# Patient Record
Sex: FEMALE | Race: WHITE | NOT HISPANIC OR LATINO
[De-identification: names, ages, dates, MRNs, and addresses within clinical notes are randomized per-mention and may not be internally consistent; named-entity substitution may affect disease eponyms.]

---

## 2017-01-05 ENCOUNTER — RX RENEWAL (OUTPATIENT)
Age: 82
End: 2017-01-05

## 2017-01-27 ENCOUNTER — RX RENEWAL (OUTPATIENT)
Age: 82
End: 2017-01-27

## 2017-02-12 ENCOUNTER — EMERGENCY (EMERGENCY)
Facility: HOSPITAL | Age: 82
LOS: 1 days | Discharge: HOME | End: 2017-02-12
Admitting: EMERGENCY MEDICINE
Payer: MEDICARE
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
## 2017-02-12 DIAGNOSIS — Z87.891 PERSONAL HISTORY OF NICOTINE DEPENDENCE: ICD-10-CM

## 2017-02-12 DIAGNOSIS — S00.83XA CONTUSION OF OTHER PART OF HEAD, INITIAL ENCOUNTER: ICD-10-CM

## 2017-02-12 DIAGNOSIS — S62.201A UNSPECIFIED FRACTURE OF FIRST METACARPAL BONE, RIGHT HAND, INITIAL ENCOUNTER FOR CLOSED FRACTURE: ICD-10-CM

## 2017-02-12 DIAGNOSIS — F03.90 UNSPECIFIED DEMENTIA WITHOUT BEHAVIORAL DISTURBANCE: ICD-10-CM

## 2017-02-12 DIAGNOSIS — M79.89 OTHER SPECIFIED SOFT TISSUE DISORDERS: ICD-10-CM
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
## 2017-02-12 DIAGNOSIS — I10 ESSENTIAL (PRIMARY) HYPERTENSION: ICD-10-CM

## 2017-02-12 PROCEDURE — 70450 CT HEAD/BRAIN W/O DYE: CPT

## 2017-02-12 PROCEDURE — 70450 CT HEAD/BRAIN W/O DYE: CPT | Mod: 26

## 2017-02-12 PROCEDURE — 99284 EMERGENCY DEPT VISIT MOD MDM: CPT

## 2017-02-12 PROCEDURE — 99284 EMERGENCY DEPT VISIT MOD MDM: CPT | Mod: 25

## 2017-02-12 PROCEDURE — 70480 CT ORBIT/EAR/FOSSA W/O DYE: CPT

## 2017-02-14 ENCOUNTER — OUTPATIENT (OUTPATIENT)
Dept: OUTPATIENT SERVICES | Facility: HOSPITAL | Age: 82
LOS: 1 days | End: 2017-02-14
Payer: MEDICARE

## 2017-02-14 ENCOUNTER — APPOINTMENT (OUTPATIENT)
Dept: RADIOLOGY | Facility: HOSPITAL | Age: 82
End: 2017-02-14

## 2017-02-14 ENCOUNTER — TRANSCRIPTION ENCOUNTER (OUTPATIENT)
Age: 82
End: 2017-02-14

## 2017-02-14 DIAGNOSIS — M79.645 PAIN IN LEFT FINGER(S): ICD-10-CM
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
## 2017-02-14 PROCEDURE — 73140 X-RAY EXAM OF FINGER(S): CPT

## 2017-02-28 ENCOUNTER — APPOINTMENT (OUTPATIENT)
Dept: GERIATRICS | Facility: CLINIC | Age: 82
End: 2017-02-28

## 2017-08-11 ENCOUNTER — RX RENEWAL (OUTPATIENT)
Age: 82
End: 2017-08-11

## 2018-02-14 ENCOUNTER — RX RENEWAL (OUTPATIENT)
Age: 83
End: 2018-02-14

## 2018-02-19 ENCOUNTER — RX RENEWAL (OUTPATIENT)
Age: 83
End: 2018-02-19

## 2018-10-23 ENCOUNTER — APPOINTMENT (OUTPATIENT)
Dept: GERIATRICS | Facility: CLINIC | Age: 83
End: 2018-10-23
Payer: MEDICARE

## 2018-10-23 VITALS
HEART RATE: 89 BPM | DIASTOLIC BLOOD PRESSURE: 70 MMHG | SYSTOLIC BLOOD PRESSURE: 126 MMHG | WEIGHT: 115 LBS | TEMPERATURE: 98.2 F | BODY MASS INDEX: 19.74 KG/M2 | OXYGEN SATURATION: 98 %

## 2018-10-23 PROCEDURE — G0439: CPT

## 2018-10-23 PROCEDURE — G0008: CPT

## 2018-10-23 PROCEDURE — 90662 IIV NO PRSV INCREASED AG IM: CPT

## 2018-10-24 LAB
ALBUMIN SERPL ELPH-MCNC: 4.6 G/DL
ALP BLD-CCNC: 65 U/L
ALT SERPL-CCNC: 14 U/L
ANION GAP SERPL CALC-SCNC: 14 MMOL/L
AST SERPL-CCNC: 23 U/L
BASOPHILS # BLD AUTO: 0.01 K/UL
BASOPHILS NFR BLD AUTO: 0.1 %
BILIRUB SERPL-MCNC: 0.2 MG/DL
BUN SERPL-MCNC: 31 MG/DL
CALCIUM SERPL-MCNC: 10.4 MG/DL
CHLORIDE SERPL-SCNC: 104 MMOL/L
CO2 SERPL-SCNC: 24 MMOL/L
CREAT SERPL-MCNC: 1.32 MG/DL
EOSINOPHIL # BLD AUTO: 0.12 K/UL
EOSINOPHIL NFR BLD AUTO: 1.3 %
FOLATE SERPL-MCNC: 18.2 NG/ML
GLUCOSE SERPL-MCNC: 117 MG/DL
HCT VFR BLD CALC: 41.3 %
HGB BLD-MCNC: 13.2 G/DL
IMM GRANULOCYTES NFR BLD AUTO: 0.2 %
LYMPHOCYTES # BLD AUTO: 2.77 K/UL
LYMPHOCYTES NFR BLD AUTO: 29.9 %
MAN DIFF?: NORMAL
MCHC RBC-ENTMCNC: 30.8 PG
MCHC RBC-ENTMCNC: 32 GM/DL
MCV RBC AUTO: 96.3 FL
MONOCYTES # BLD AUTO: 0.58 K/UL
MONOCYTES NFR BLD AUTO: 6.3 %
NEUTROPHILS # BLD AUTO: 5.76 K/UL
NEUTROPHILS NFR BLD AUTO: 62.2 %
PLATELET # BLD AUTO: 247 K/UL
POTASSIUM SERPL-SCNC: 4.5 MMOL/L
PROT SERPL-MCNC: 7.4 G/DL
RBC # BLD: 4.29 M/UL
RBC # FLD: 14.4 %
SODIUM SERPL-SCNC: 142 MMOL/L
TSH SERPL-ACNC: 0.88 UIU/ML
VIT B12 SERPL-MCNC: >2000 PG/ML
WBC # FLD AUTO: 9.26 K/UL

## 2019-02-01 ENCOUNTER — RX RENEWAL (OUTPATIENT)
Age: 84
End: 2019-02-01

## 2019-02-26 ENCOUNTER — APPOINTMENT (OUTPATIENT)
Dept: GERIATRICS | Facility: CLINIC | Age: 84
End: 2019-02-26
Payer: MEDICARE

## 2019-02-26 VITALS
SYSTOLIC BLOOD PRESSURE: 136 MMHG | TEMPERATURE: 97.9 F | HEART RATE: 96 BPM | WEIGHT: 121.1 LBS | DIASTOLIC BLOOD PRESSURE: 70 MMHG | BODY MASS INDEX: 20.79 KG/M2 | OXYGEN SATURATION: 97 %

## 2019-02-26 PROCEDURE — 99214 OFFICE O/P EST MOD 30 MIN: CPT

## 2019-02-28 NOTE — REVIEW OF SYSTEMS
[Confused] : confusion [Difficulty Walking] : difficulty walking [As Noted in HPI] : as noted in HPI [Suicidal] : not suicidal [Sleep Disturbances] : no sleep disturbances [Anxiety] : no anxiety [Depression] : no depression [Change In Personality] : no personality change [Negative] : Heme/Lymph

## 2019-02-28 NOTE — SOCIAL HISTORY
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 [de-identified] : No safety concerns identified.

## 2019-02-28 NOTE — HISTORY OF PRESENT ILLNESS
[FreeTextEntry1] : 85 yo female here with acute pain for past week that is in right buttock area and radiates down leg. Pt and aide deny a fall. Pt able to bear weight. Pt walked in.  Pt pain reproducible with movement, raising leg. No bruising acc to pt and [place].

## 2019-02-28 NOTE — PHYSICAL EXAM
[General Appearance - Alert] : alert [Sclera] : the sclera and conjunctiva were normal [PERRL With Normal Accommodation] : pupils were equal in size, round, and reactive to light [Extraocular Movements] : extraocular movements were intact [Outer Ear] : the ears and nose were normal in appearance [Oropharynx] : the oropharynx was normal [Neck Appearance] : the appearance of the neck was normal [Neck Cervical Mass (___cm)] : no neck mass was observed [Jugular Venous Distention Increased] : there was no jugular-venous distention [Thyroid Diffuse Enlargement] : the thyroid was not enlarged [Thyroid Nodule] : there were no palpable thyroid nodules [Auscultation Breath Sounds / Voice Sounds] : lungs were clear to auscultation bilaterally [Heart Rate And Rhythm] : heart rate was normal and rhythm regular [Heart Sounds] : normal S1 and S2 [Heart Sounds Gallop] : no gallops [Murmurs] : no murmurs [Heart Sounds Pericardial Friction Rub] : no pericardial rub [Bowel Sounds] : normal bowel sounds [Abdomen Soft] : soft [Abdomen Tenderness] : non-tender [Abdomen Mass (___ Cm)] : no abdominal mass palpated [No CVA Tenderness] : no ~M costovertebral angle tenderness [No Spinal Tenderness] : no spinal tenderness [Abnormal Walk] : normal gait [Nail Clubbing] : no clubbing  or cyanosis of the fingernails [Musculoskeletal - Swelling] : no joint swelling seen [Motor Tone] : muscle strength and tone were normal [FreeTextEntry1] : able to move hip joints, knee joints.   [Skin Color & Pigmentation] : normal skin color and pigmentation [Skin Turgor] : normal skin turgor [] : no rash [Deep Tendon Reflexes (DTR)] : deep tendon reflexes were 2+ and symmetric [Sensation] : the sensory exam was normal to light touch and pinprick [No Focal Deficits] : no focal deficits

## 2019-02-28 NOTE — ASSESSMENT
[FreeTextEntry1] : 85 yo with new sciatic like pain, unclear etiology, no fall, no vcf symptoms. \par \par tylenol 2 tabs twice a day\par Lidoderm patch to buttock 12 hours / day\par massage/PT\par \par If worsens or changes consider CT scan L/S, hip, pelvis, spine vs Hip xrays. \par \par Return in one week

## 2019-03-02 ENCOUNTER — EMERGENCY (EMERGENCY)
Facility: HOSPITAL | Age: 84
LOS: 1 days | Discharge: HOME | End: 2019-03-02
Attending: EMERGENCY MEDICINE | Admitting: EMERGENCY MEDICINE
Payer: MEDICARE

## 2019-03-02 VITALS
SYSTOLIC BLOOD PRESSURE: 133 MMHG | RESPIRATION RATE: 17 BRPM | HEART RATE: 80 BPM | OXYGEN SATURATION: 99 % | DIASTOLIC BLOOD PRESSURE: 76 MMHG

## 2019-03-02 VITALS
WEIGHT: 115.08 LBS | RESPIRATION RATE: 17 BRPM | HEIGHT: 63 IN | HEART RATE: 94 BPM | SYSTOLIC BLOOD PRESSURE: 135 MMHG | OXYGEN SATURATION: 99 % | DIASTOLIC BLOOD PRESSURE: 78 MMHG | TEMPERATURE: 98 F

## 2019-03-02 DIAGNOSIS — Z98.890 OTHER SPECIFIED POSTPROCEDURAL STATES: Chronic | ICD-10-CM

## 2019-03-02 DIAGNOSIS — M53.3 SACROCOCCYGEAL DISORDERS, NOT ELSEWHERE CLASSIFIED: ICD-10-CM

## 2019-03-02 PROCEDURE — 99283 EMERGENCY DEPT VISIT LOW MDM: CPT

## 2019-03-02 PROCEDURE — 72110 X-RAY EXAM L-2 SPINE 4/>VWS: CPT | Mod: 26

## 2019-03-02 PROCEDURE — 72110 X-RAY EXAM L-2 SPINE 4/>VWS: CPT

## 2019-03-02 RX ORDER — OXYCODONE HYDROCHLORIDE 5 MG/1
1 TABLET ORAL
Qty: 21 | Refills: 0
Start: 2019-03-02 | End: 2019-03-08

## 2019-03-02 RX ORDER — OXYCODONE AND ACETAMINOPHEN 5; 325 MG/1; MG/1
1 TABLET ORAL ONCE
Qty: 0 | Refills: 0 | Status: DISCONTINUED | OUTPATIENT
Start: 2019-03-02 | End: 2019-03-02

## 2019-03-02 RX ADMIN — OXYCODONE AND ACETAMINOPHEN 1 TABLET(S): 5; 325 TABLET ORAL at 13:07

## 2019-03-02 RX ADMIN — OXYCODONE AND ACETAMINOPHEN 1 TABLET(S): 5; 325 TABLET ORAL at 14:07

## 2019-03-02 NOTE — ED PROVIDER NOTE - NSFOLLOWUPINSTRUCTIONS_ED_ALL_ED_FT
Follow with your PMD within 48-72 hours.  Rest, no heavy lifting.  Warm compresses to area. Light walking. Take Napsrosyn 1 tab every 12 hours for pain with food, Percocet 1 tablet every 6 hrs as needed for breakthrough discomfort- caution drowsiness while taking this medication- do not drive or operate heavy machinery. Any worsening pain, weakness, numbness, bowel or urinary incontinence return to ER

## 2019-03-02 NOTE — ED PROVIDER NOTE - CARE PROVIDERS DIRECT ADDRESSES
,DirectAddress_Unknown ,DirectAddress_Unknown,[email or web address]

## 2019-03-02 NOTE — ED PROVIDER NOTE - PROVIDER TOKENS
PROVIDER:[TOKEN:[[id number]]] PROVIDER:[[id number]]],PROVIDER:[TOKEN:[[id number]]]

## 2019-03-02 NOTE — ED PROVIDER NOTE - ATTENDING CONTRIBUTION TO CARE
Pt seen and examined with [facility] Daigle; I agree with H and P as documented with corrections/additions noted in chart and below; I personally was present for key points of the exam and all procedures  elderly female with dementia sent in by PCP for worsening back pain less responsive to OTC meds (apap)  xrays negative positive responsive to percocet no "red flags" suggestive of need for advanced imaging and plain films appropriate for age  Plan  extra strength apap (1000 mg q8)  oxycodone w/o apap 1 with same interval for breakthrough  f/u and d/w PCP

## 2019-03-02 NOTE — ED ADULT TRIAGE NOTE - CHIEF COMPLAINT QUOTE
"I have pain in my ass shooting down my legs, she has pain to lower back radiating to her legs, she has history of sciatica" -patient, son

## 2019-03-02 NOTE — ED PROVIDER NOTE - CHPI ED SYMPTOMS NEG
no fatigue/no neck tenderness/no bladder dysfunction/no motor function loss/no bowel dysfunction/no constipation

## 2019-03-02 NOTE — ED ADULT NURSE NOTE - NSIMPLEMENTINTERV_GEN_ALL_ED
Implemented All Fall with Harm Risk Interventions:  [place] to call system. Call bell, personal items and telephone within reach. Instruct patient to call for assistance. Room bathroom lighting operational. Non-slip footwear when patient is off stretcher. Physically safe environment: no spills, clutter or unnecessary equipment. Stretcher in lowest position, wheels locked, appropriate side rails in place. Provide visual cue, wrist band, yellow gown, etc. Monitor gait and stability. Monitor for mental status changes and reorient to person, place, and time. Review medications for side effects contributing to fall risk. Reinforce activity limits and safety measures with patient and family. Provide visual clues: red socks.

## 2019-03-02 NOTE — ED PROVIDER NOTE - NS CPE EDP MUSC SACRAL LOC
+ midline ttp, + ttp over b/l sciatic region, no deformity, + slr b/l, distal strength and sensation intact b/l le, no saddle anesthesia

## 2019-03-02 NOTE — ED ADULT NURSE NOTE - OBJECTIVE STATEMENT
pt complaining of back pain 7/10 radiating to leg.  denies trauma. denies fall. denies numbness/tingling.

## 2019-03-02 NOTE — ED ADULT NURSE NOTE - NS TRANSFER PATIENT BELONGINGS
Statement Selected Clothing Statement Selected Statement Selected Statement Selected Statement Selected

## 2019-03-02 NOTE — ED PROVIDER NOTE - OBJECTIVE STATEMENT
87 y/o F PMH HTN, Dementia  c/o buttock pain x 1 week. Pt recently dx sciatica, started on home PT (first session yesterday). Taking tylenol without releif, took 400mg ibu this am with minimal relief. +difficulty ambulating d/t pain. 87 y/o F PMH HTN, Dementia  c/o buttock pain x 1 week. Pt recently dx sciatica, started on home PT (first session yesterday). Taking tylenol without relief, took 400mg ibu this am with minimal relief. +difficulty ambulating d/t pain. Pain radiates down b/l buttock and upper posterior thigh. Denies recent fall/trauma, cp, sob, abdominal pain. dysuria, bladder/bowel dysfunction, numbness/tingling/weakness.

## 2019-03-02 NOTE — ED PROVIDER NOTE - CARE PROVIDER_API CALL
Harvey Acosta)  Orthopaedic Sports Medicine; Orthopaedic Surgery  [address] [address]  Phone: [phone]  Fax: [phone]  Follow Up Time: Harvey Acosta)  Orthopaedic Sports Medicine; Orthopaedic Surgery  [address] [facility], [facility] [address]  [place] [address]  Phone: [phone]  Fax: [phone]  Follow Up Time:     Lynda Hood)  [facility]iatric Medicine; [facility]; Internal Medicine  [address] [facility], [facility] [address]  [place] [address]  Phone: [phone]  Fax: [phone]  Follow Up Time:

## 2019-03-03 ENCOUNTER — APPOINTMENT (OUTPATIENT)
Dept: GERIATRICS | Facility: CLINIC | Age: 84
End: 2019-03-03
Payer: MEDICARE

## 2019-03-03 PROCEDURE — 99348 HOME/RES VST EST LOW MDM 30: CPT

## 2019-03-04 ENCOUNTER — CHART COPY (OUTPATIENT)
Age: 84
End: 2019-03-04

## 2019-03-04 PROBLEM — F03.90 UNSPECIFIED DEMENTIA WITHOUT BEHAVIORAL DISTURBANCE: Chronic | Status: ACTIVE | Noted: 2019-03-02

## 2019-03-04 PROBLEM — F03.90 UNSPECIFIED DEMENTIA, UNSPECIFIED SEVERITY, WITHOUT BEHAVIORAL DISTURBANCE, PSYCHOTIC DISTURBANCE, MOOD DISTURBANCE, AND ANXIETY: Chronic | Status: ACTIVE | Noted: 2019-03-02

## 2019-03-04 PROBLEM — I10 ESSENTIAL (PRIMARY) HYPERTENSION: Chronic | Status: ACTIVE | Noted: 2019-03-02

## 2019-03-05 ENCOUNTER — APPOINTMENT (OUTPATIENT)
Dept: CT IMAGING | Facility: HOSPITAL | Age: 84
End: 2019-03-05
Payer: MEDICARE

## 2019-03-05 ENCOUNTER — LABORATORY RESULT (OUTPATIENT)
Age: 84
End: 2019-03-05

## 2019-03-05 ENCOUNTER — OUTPATIENT (OUTPATIENT)
Dept: OUTPATIENT SERVICES | Facility: HOSPITAL | Age: 84
LOS: 1 days | End: 2019-03-05
Payer: MEDICARE

## 2019-03-05 DIAGNOSIS — Z98.890 OTHER SPECIFIED POSTPROCEDURAL STATES: Chronic | ICD-10-CM

## 2019-03-05 DIAGNOSIS — M79.18 MYALGIA, OTHER SITE: ICD-10-CM

## 2019-03-05 DIAGNOSIS — R41.3 OTHER AMNESIA: ICD-10-CM

## 2019-03-05 DIAGNOSIS — M54.9 DORSALGIA, UNSPECIFIED: ICD-10-CM

## 2019-03-05 PROCEDURE — 72131 CT LUMBAR SPINE W/O DYE: CPT | Mod: 26

## 2019-03-05 PROCEDURE — 74176 CT ABD & PELVIS W/O CONTRAST: CPT | Mod: 26

## 2019-03-05 PROCEDURE — 72131 CT LUMBAR SPINE W/O DYE: CPT

## 2019-03-05 PROCEDURE — 72192 CT PELVIS W/O DYE: CPT

## 2019-03-05 PROCEDURE — 72192 CT PELVIS W/O DYE: CPT | Mod: 26,59

## 2019-03-05 PROCEDURE — 74176 CT ABD & PELVIS W/O CONTRAST: CPT

## 2019-03-05 NOTE — REVIEW OF SYSTEMS
[As Noted in HPI] : as noted in HPI [Difficulty Walking] : difficulty walking [Suicidal] : not suicidal [Sleep Disturbances] : no sleep disturbances [Anxiety] : no anxiety [Depression] : no depression [Negative] : Heme/Lymph [de-identified] : baseline confusion

## 2019-03-05 NOTE — SOCIAL HISTORY
[FreeTextEntry1] : son and dtr in law [FreeTextEntry2] : great [FreeTextEntry3] : kailee pt [FreeTextEntry4] : poor

## 2019-03-05 NOTE — PHYSICAL EXAM
[General Appearance - Alert] : alert [Sclera] : the sclera and conjunctiva were normal [PERRL With Normal Accommodation] : pupils were equal in size, round, and reactive to light [Extraocular Movements] : extraocular movements were intact [Outer Ear] : the ears and nose were normal in appearance [Oropharynx] : the oropharynx was normal [Neck Appearance] : the appearance of the neck was normal [Neck Cervical Mass (___cm)] : no neck mass was observed [Jugular Venous Distention Increased] : there was no jugular-venous distention [Thyroid Diffuse Enlargement] : the thyroid was not enlarged [Thyroid Nodule] : there were no palpable thyroid nodules [Auscultation Breath Sounds / Voice Sounds] : lungs were clear to auscultation bilaterally [Heart Rate And Rhythm] : heart rate was normal and rhythm regular [Heart Sounds] : normal S1 and S2 [Heart Sounds Gallop] : no gallops [Murmurs] : no murmurs [Heart Sounds Pericardial Friction Rub] : no pericardial rub [Full Pulse] : the pedal pulses are present [Edema] : there was no peripheral edema [Breast Appearance] : normal in appearance [Breast Palpation Mass] : no palpable masses [Bowel Sounds] : normal bowel sounds [Abdomen Soft] : soft [Abdomen Tenderness] : non-tender [Abdomen Mass (___ Cm)] : no abdominal mass palpated [Cervical Lymph Nodes Enlarged Posterior Bilaterally] : posterior cervical [Cervical Lymph Nodes Enlarged Anterior Bilaterally] : anterior cervical [Supraclavicular Lymph Nodes Enlarged Bilaterally] : supraclavicular [Axillary Lymph Nodes Enlarged Bilaterally] : axillary [Femoral Lymph Nodes Enlarged Bilaterally] : femoral [Inguinal Lymph Nodes Enlarged Bilaterally] : inguinal [No CVA Tenderness] : no ~M costovertebral angle tenderness [No Spinal Tenderness] : no spinal tenderness [Abnormal Walk] : normal gait [Nail Clubbing] : no clubbing  or cyanosis of the fingernails [Musculoskeletal - Swelling] : no joint swelling seen [Motor Tone] : muscle strength and tone were normal [Skin Color & Pigmentation] : normal skin color and pigmentation [Skin Turgor] : normal skin turgor [] : no rash [Deep Tendon Reflexes (DTR)] : deep tendon reflexes were 2+ and symmetric [Sensation] : the sensory exam was normal to light touch and pinprick [No Focal Deficits] : no focal deficits [Oriented To Time, Place, And Person] : oriented to person, place, and time [Affect] : the affect was normal [Mood] : the mood was normal [Over the Past 2 Weeks, Have You Felt Down, Depressed, or Hopeless?] : 1.) Over the past 2 weeks, have you felt down, depressed, or hopeless? No [Over the Past 2 Weeks, Have You Felt Little Interest or Pleasure Doing Things?] : 2.) Over the past 2 weeks, have you felt little interest or pleasure doing things? No [FreeTextEntry1] : happy, laughing, pleasnat.  Loves life.

## 2019-03-05 NOTE — ASSESSMENT
[FreeTextEntry1] : PT with severe back pain consistement with sciatica, but of unclear cause.  Pain is worse than a few days ago in office, less mobility and pt more sever.\par \par 1) Back Pain - sciatica, xray unremarkable, consider ct scan ls spine and pelvis.  oxycodone 5 mg twice a day, tylenol 1000 mg twie a day, advil 400 mg twice a day all for next 48 hours. await ct scan results. Check labs.  \par \par COnsider Orthopedic referral, Consider PT eval.

## 2019-03-05 NOTE — HISTORY OF PRESENT ILLNESS
[FreeTextEntry1] : 85 yo female with acute pain and family asked for me to see pt at home.  Pt seen in office 3 days ago in office. Pt was able to walk into office and into room then. Pt now bed ridden. Pt had pain and I advised to go to ED. Pt went to ED day yesterday. Xrays of L/S Spine and xray done. No obvious fractures or VCF seen on xray. Pts pain is point tenderness in right buttock and radiates across to left side. Pt cannot walk bc of pain.  Pt is moving bowels, urinating and eating.  Pt did sleep.\par \par Pain is dull and exacerbated by movement [0] : 2) Feeling down, depressed, or hopeless: Not at all

## 2019-03-10 ENCOUNTER — APPOINTMENT (OUTPATIENT)
Dept: GERIATRICS | Facility: CLINIC | Age: 84
End: 2019-03-10
Payer: MEDICARE

## 2019-03-10 VITALS — HEART RATE: 76 BPM | SYSTOLIC BLOOD PRESSURE: 120 MMHG | RESPIRATION RATE: 12 BRPM | DIASTOLIC BLOOD PRESSURE: 80 MMHG

## 2019-03-10 PROCEDURE — 99214 OFFICE O/P EST MOD 30 MIN: CPT

## 2019-03-10 PROCEDURE — 99497 ADVNCD CARE PLAN 30 MIN: CPT

## 2019-03-12 ENCOUNTER — APPOINTMENT (OUTPATIENT)
Dept: GERIATRICS | Facility: CLINIC | Age: 84
End: 2019-03-12

## 2019-03-18 ENCOUNTER — RX RENEWAL (OUTPATIENT)
Age: 84
End: 2019-03-18

## 2019-03-21 ENCOUNTER — RX RENEWAL (OUTPATIENT)
Age: 84
End: 2019-03-21

## 2019-04-29 ENCOUNTER — APPOINTMENT (OUTPATIENT)
Dept: GERIATRICS | Facility: CLINIC | Age: 84
End: 2019-04-29
Payer: MEDICARE

## 2019-04-29 ENCOUNTER — NON-APPOINTMENT (OUTPATIENT)
Age: 84
End: 2019-04-29

## 2019-04-29 ENCOUNTER — LABORATORY RESULT (OUTPATIENT)
Age: 84
End: 2019-04-29

## 2019-04-29 VITALS
WEIGHT: 111 LBS | DIASTOLIC BLOOD PRESSURE: 60 MMHG | BODY MASS INDEX: 18.49 KG/M2 | SYSTOLIC BLOOD PRESSURE: 110 MMHG | OXYGEN SATURATION: 95 % | RESPIRATION RATE: 16 BRPM | HEART RATE: 90 BPM | HEIGHT: 64.8 IN

## 2019-04-29 PROCEDURE — 93000 ELECTROCARDIOGRAM COMPLETE: CPT | Mod: 59

## 2019-04-29 PROCEDURE — 69210 REMOVE IMPACTED EAR WAX UNI: CPT

## 2019-04-29 PROCEDURE — G0439: CPT

## 2019-04-29 RX ORDER — CALCITONIN SALMON 200 [IU]/1
200 SPRAY, METERED NASAL DAILY
Qty: 1 | Refills: 2 | Status: COMPLETED | COMMUNITY
Start: 2019-03-06 | End: 2019-04-29

## 2019-04-29 RX ORDER — FENTANYL 12 UG/H
12 PATCH, EXTENDED RELEASE TRANSDERMAL
Qty: 5 | Refills: 0 | Status: COMPLETED | COMMUNITY
Start: 2019-03-10 | End: 2019-04-29

## 2019-04-29 RX ORDER — METHYLPREDNISOLONE 4 MG/1
4 TABLET ORAL
Qty: 1 | Refills: 0 | Status: COMPLETED | COMMUNITY
Start: 2019-03-08 | End: 2019-04-29

## 2019-04-29 RX ORDER — AZITHROMYCIN 250 MG/1
250 TABLET, FILM COATED ORAL
Qty: 6 | Refills: 0 | Status: COMPLETED | COMMUNITY
Start: 2018-09-14 | End: 2019-04-29

## 2019-04-29 NOTE — PHYSICAL EXAM
[General Appearance - Alert] : alert [General Appearance - In No Acute Distress] : in no acute distress [Sclera] : the sclera and conjunctiva were normal [PERRL With Normal Accommodation] : pupils were equal in size, round, and reactive to light [Extraocular Movements] : extraocular movements were intact [Normal Oral Mucosa] : normal oral mucosa [No Oral Pallor] : no oral pallor [No Oral Cyanosis] : no oral cyanosis [Outer Ear] : the ears and nose were normal in appearance [Oropharynx] : The oropharynx was normal [FreeTextEntry1] : cerumen bilaterally - removed with curette [Neck Appearance] : the appearance of the neck was normal [Neck Cervical Mass (___cm)] : no neck mass was observed [Jugular Venous Distention Increased] : there was no jugular-venous distention [Thyroid Diffuse Enlargement] : the thyroid was not enlarged [Thyroid Nodule] : there were no palpable thyroid nodules [Auscultation Breath Sounds / Voice Sounds] : lungs were clear to auscultation bilaterally [Heart Rate And Rhythm] : heart rate was normal and rhythm regular [Heart Sounds] : normal S1 and S2 [Heart Sounds Gallop] : no gallops [Murmurs] : no murmurs [Heart Sounds Pericardial Friction Rub] : no pericardial rub [Bowel Sounds] : normal bowel sounds [Abdomen Soft] : soft [Abdomen Tenderness] : non-tender [Abdomen Mass (___ Cm)] : no abdominal mass palpated [Cervical Lymph Nodes Enlarged Posterior Bilaterally] : posterior cervical [Cervical Lymph Nodes Enlarged Anterior Bilaterally] : anterior cervical [Supraclavicular Lymph Nodes Enlarged Bilaterally] : supraclavicular [Axillary Lymph Nodes Enlarged Bilaterally] : axillary [Femoral Lymph Nodes Enlarged Bilaterally] : femoral [Inguinal Lymph Nodes Enlarged Bilaterally] : inguinal [No CVA Tenderness] : no ~M costovertebral angle tenderness [No Spinal Tenderness] : no spinal tenderness [Skin Color & Pigmentation] : normal skin color and pigmentation [Skin Turgor] : normal skin turgor [] : no rash [Total Score ___ / 30] : the patient achieved a score of [unfilled] /30 [Date / Time ___ / 5] : date / time [unfilled] / 5 [Place ___ / 5] : place [unfilled] / 5 [Registration ___ / 3] : registration [unfilled] / 3 [Serial Sevens ___/5] : serial sevens [unfilled] / 5 [Naming 2 Objects ___ / 2] : naming two objects [unfilled] / 2 [Repeating a Sentence ___ / 1] : repeating a sentence [unfilled] / 1 [Writing a Sentence ___ / 1] : write sentence [unfilled] / 1 [3-stage Verbal Command ___ / 3] : three-stage verbal command [unfilled] / 3 [Written Command ___ / 1] : written command [unfilled] / 1 [Copy a Design ___ / 1] : copy a design [unfilled] / 1 [Recall ___ / 3] : recall [unfilled] / 3 [Oriented To Time, Place, And Person] : oriented to person, place, and time [Impaired Insight] : insight and judgment were intact [Affect] : the affect was normal

## 2019-04-29 NOTE — ASSESSMENT
[FreeTextEntry1] : 87 yo female with recent VCF and pt with sob with exertion, poor appetite and weight loss but functioning much better now than last visit. No longer using walker, only cane. .  \par \par 1) Weight loss, sob with exertion - Check cbc, cmp , lipids - last labs wnl\par 2) SOB - check ekg\par 3) Cerumen - wax removed bilaterally at bedside with curette\par 4) Advance directives - complete MOLST with Sierra WALTON now.

## 2019-04-29 NOTE — HISTORY OF PRESENT ILLNESS
[PMH Reviewed and Updated] : past medical history reviewed and updated [PSH Reviewed and Updated] : past surgical history reviewed and updated [1] : 1 [Over the Past 2 Weeks, Have You Felt Down, Depressed, or Hopeless?] : 1.) Over the past 2 weeks, have you felt down, depressed, or hopeless? No [Over the Past 2 Weeks, Have You Felt Little Interest or Pleasure Doing Things?] : 2.) Over the past 2 weeks, have you felt little interest or pleasure doing things? No [Retired] : retired from work [Smoking Status Reviewed and Updated] : Smoking status was reviewed and updated [None] : The patient has no concerns about alcohol abuse [Adequate] : adequate [Needs some help with ADLs] : need some help with ADLs [Does not drive] : does not drive [No history of falls] : no history of falls [Advanced Directives Discussed] : discussed at today's visit [de-identified] : No safety concerns identified. [FreeTextEntry1] : 85 yo female with recent VCF of sacral ala and sacral 2 fracture. Pt has improved in pain, no longer using oxycodone, fentanyl patch and using tylenol only. Pt had a course of medrol dose packet for 5 days last month. Aide asking if pt can take advil. Took advil in past but advised against it bc of Pt having only one functional kidney.\par \par Pt gets so with exercise. Pt was essentially bed bound for 4 weeks. Now working with PT.  HR 90 after walking into room. Decreases to 84 with rest. Pulse ox 95%\par \par Pt with poor appetite, not eating much.

## 2019-04-29 NOTE — REVIEW OF SYSTEMS
[Fever] : no fever [Chills] : no chills [Feeling Poorly] : not feeling poorly [Feeling Tired] : feeling tired [Recent Weight Gain (___ Lbs)] : recent [unfilled] ~Ulb weight gain [Shortness Of Breath] : shortness of breath [Cough] : no cough [SOB on Exertion] : shortness of breath during exertion [Orthopnea] : no orthopnea [As Noted in HPI] : as noted in HPI [Abdominal Pain] : no abdominal pain [Vomiting] : no vomiting [Constipation] : no constipation [Diarrhea] : no diarrhea [Confused] : confusion [Dizziness] : no dizziness [Fainting] : no fainting [Negative] : Heme/Lymph [FreeTextEntry2] : 10 pound weight loss since January [FreeTextEntry6] : sob with exertion, no cp [FreeTextEntry7] : no pain [de-identified] : baseline confusion

## 2019-04-30 LAB
ALBUMIN SERPL ELPH-MCNC: 4.5 G/DL
ALP BLD-CCNC: 115 U/L
ALT SERPL-CCNC: 14 U/L
ANION GAP SERPL CALC-SCNC: 16 MMOL/L
APPEARANCE: CLEAR
AST SERPL-CCNC: 23 U/L
BASOPHILS # BLD AUTO: 0.04 K/UL
BASOPHILS NFR BLD AUTO: 0.4 %
BILIRUB SERPL-MCNC: 0.4 MG/DL
BILIRUBIN URINE: NEGATIVE
BLOOD URINE: NEGATIVE
BUN SERPL-MCNC: 20 MG/DL
CALCIUM SERPL-MCNC: 11.2 MG/DL
CHLORIDE SERPL-SCNC: 103 MMOL/L
CHOLEST SERPL-MCNC: 191 MG/DL
CHOLEST/HDLC SERPL: 2.9 RATIO
CO2 SERPL-SCNC: 22 MMOL/L
COLOR: YELLOW
CREAT SERPL-MCNC: 1.12 MG/DL
EOSINOPHIL # BLD AUTO: 0.35 K/UL
EOSINOPHIL NFR BLD AUTO: 3.1 %
GLUCOSE QUALITATIVE U: NEGATIVE
GLUCOSE SERPL-MCNC: 106 MG/DL
HCT VFR BLD CALC: 43.4 %
HDLC SERPL-MCNC: 65 MG/DL
HGB BLD-MCNC: 13.3 G/DL
IMM GRANULOCYTES NFR BLD AUTO: 0.7 %
KETONES URINE: NEGATIVE
LDLC SERPL CALC-MCNC: 102 MG/DL
LEUKOCYTE ESTERASE URINE: ABNORMAL
LYMPHOCYTES # BLD AUTO: 2.97 K/UL
LYMPHOCYTES NFR BLD AUTO: 26.1 %
MAN DIFF?: NORMAL
MCHC RBC-ENTMCNC: 30.6 GM/DL
MCHC RBC-ENTMCNC: 30.7 PG
MCV RBC AUTO: 100.2 FL
MONOCYTES # BLD AUTO: 1.09 K/UL
MONOCYTES NFR BLD AUTO: 9.6 %
NEUTROPHILS # BLD AUTO: 6.86 K/UL
NEUTROPHILS NFR BLD AUTO: 60.1 %
NITRITE URINE: NEGATIVE
PH URINE: 6
PLATELET # BLD AUTO: 286 K/UL
POTASSIUM SERPL-SCNC: 4.4 MMOL/L
PROT SERPL-MCNC: 7.3 G/DL
PROTEIN URINE: ABNORMAL
RBC # BLD: 4.33 M/UL
RBC # FLD: 16 %
SODIUM SERPL-SCNC: 141 MMOL/L
SPECIFIC GRAVITY URINE: 1.02
TRIGL SERPL-MCNC: 121 MG/DL
UROBILINOGEN URINE: NORMAL
WBC # FLD AUTO: 11.39 K/UL

## 2019-10-07 RX ORDER — OXYCODONE 5 MG/1
5 TABLET ORAL
Qty: 56 | Refills: 0 | Status: COMPLETED | COMMUNITY
Start: 2019-03-02 | End: 2019-10-07

## 2020-01-30 ENCOUNTER — RX RENEWAL (OUTPATIENT)
Age: 85
End: 2020-01-30

## 2020-02-18 ENCOUNTER — APPOINTMENT (OUTPATIENT)
Dept: GERIATRICS | Facility: CLINIC | Age: 85
End: 2020-02-18
Payer: MEDICARE

## 2020-02-18 VITALS
HEIGHT: 64 IN | OXYGEN SATURATION: 95 % | BODY MASS INDEX: 21.65 KG/M2 | RESPIRATION RATE: 16 BRPM | WEIGHT: 126.8 LBS | TEMPERATURE: 97.7 F | DIASTOLIC BLOOD PRESSURE: 70 MMHG | HEART RATE: 73 BPM | SYSTOLIC BLOOD PRESSURE: 122 MMHG

## 2020-02-18 DIAGNOSIS — Z00.00 ENCOUNTER FOR GENERAL ADULT MEDICAL EXAMINATION W/OUT ABNORMAL FINDINGS: ICD-10-CM

## 2020-02-18 DIAGNOSIS — R63.4 ABNORMAL WEIGHT LOSS: ICD-10-CM

## 2020-02-18 PROCEDURE — G0439: CPT | Mod: GC

## 2020-02-18 RX ORDER — LORATADINE 5 MG
17 TABLET,CHEWABLE ORAL DAILY
Qty: 1 | Refills: 5 | Status: DISCONTINUED | COMMUNITY
Start: 2019-03-06 | End: 2020-02-18

## 2020-02-18 NOTE — REASON FOR VISIT
[Initial Annual Medicare Wellness Visit] : an initial annual Medicare wellness visit [Family Member] : family member

## 2020-02-18 NOTE — PHYSICAL EXAM
[General Appearance - Alert] : alert [General Appearance - Well Nourished] : well nourished [General Appearance - Well Developed] : well developed [General Appearance - In No Acute Distress] : in no acute distress [General Appearance - Well-Appearing] : healthy appearing [Sclera] : the sclera and conjunctiva were normal [Extraocular Movements] : extraocular movements were intact [PERRL With Normal Accommodation] : pupils were equal in size, round, and reactive to light [Strabismus] : no strabismus was seen [Normal Oral Mucosa] : normal oral mucosa [No Oral Pallor] : no oral pallor [Outer Ear] : the ears and nose were normal in appearance [Neck Appearance] : the appearance of the neck was normal [Neck Cervical Mass (___cm)] : no neck mass was observed [Jugular Venous Distention Increased] : there was no jugular-venous distention [Respiration, Rhythm And Depth] : normal respiratory rhythm and effort [Exaggerated Use Of Accessory Muscles For Inspiration] : no accessory muscle use [Auscultation Breath Sounds / Voice Sounds] : lungs were clear to auscultation bilaterally [Apical Impulse] : the apical impulse was normal [Heart Rate And Rhythm] : heart rate was normal and rhythm regular [Heart Sounds] : normal S1 and S2 [Heart Sounds Gallop] : no gallops [Bowel Sounds] : normal bowel sounds [Abdomen Soft] : soft [Abdomen Tenderness] : non-tender [Abdomen Mass (___ Cm)] : no abdominal mass palpated [No CVA Tenderness] : no ~M costovertebral angle tenderness [No Spinal Tenderness] : no spinal tenderness [Abnormal Walk] : normal gait [Nail Clubbing] : no clubbing  or cyanosis of the fingernails [Involuntary Movements] : no involuntary movements were seen [Musculoskeletal - Swelling] : no joint swelling seen [Motor Tone] : muscle strength and tone were normal [Skin Color & Pigmentation] : normal skin color and pigmentation [] : no rash [Skin Lesions] : no skin lesions [Skin Turgor] : normal skin turgor [Total Score ___ / 30] : the patient achieved a score of [unfilled] /30 [Date / Time ___ / 5] : date / time [unfilled] / 5 [Place ___ / 5] : place [unfilled] / 5 [Registration ___ / 3] : registration [unfilled] / 3 [Serial Sevens ___/5] : serial sevens [unfilled] / 5 [Naming 2 Objects ___ / 2] : naming two objects [unfilled] / 2 [Repeating a Sentence ___ / 1] : repeating a sentence [unfilled] / 1 [Writing a Sentence ___ / 1] : write sentence [unfilled] / 1 [3-stage Verbal Command ___ / 3] : three-stage verbal command [unfilled] / 3 [Written Command ___ / 1] : written command [unfilled] / 1 [Copy a Design ___ / 1] : copy a design [unfilled] / 1 [Recall ___ / 3] : recall [unfilled] / 3

## 2020-02-19 NOTE — HISTORY OF PRESENT ILLNESS
[PMH Reviewed and Updated] : past medical history reviewed and updated [0] : 0 [___ Sons] : [unfilled] son(s) [Retired] : retired from work [None] : The patient has no concerns about alcohol abuse [Never] : has never used illicit drugs [Children] : children [Does not drive] : does not drive [Compliant with medications] : compliant with medications [Seatbelts] : seatbelts [Smoke Detectors] : smoke detectors [Bathroom Grab Bars] : bathroom grab bars [Carbon Monoxide Detector] : carbon monoxide detector [Fall Prevention Measures] : fall prevention measures [Sunscreen] : sunscreen [de-identified] : Ward Esquivel and Diamond  [FreeTextEntry1] : 88 yo female from home lives with her son Yoandy and accompanied by her HHA Jeane. Pt ambulates with a cane. PMH of dementia, HTN, HLD, s/p total left hip replacement and osteoporosis. \par \par Has been going to [facility] programs [facility], has sometimes a hard time agreeing to go but once she is there she enjoys her activities. Denies any falls since last visit, not been in the hospital since last visit. Denies any pain or taking Tylenol for hip / back pain.\par \par Pt was taking p[employer] insure as a nutritional supplement.

## 2020-02-19 NOTE — END OF VISIT
[FreeTextEntry3] : Pt seen with fellwo. Agree with note. MMSE declined. Pt still with social graces in tact.  Eating well. No falls, No pain. Aide stated pt has expressed pain in lower leg on left. Both legs examined, pt ambulated well. Goodl rom of joints. NO pain illicited.  Will follow.  [] : Fellow

## 2020-02-19 NOTE — ASSESSMENT
[FreeTextEntry1] : - Will order repeat home blood work \par - Influenza vaccine given during visit \par - f/u in 6 months

## 2020-02-19 NOTE — REVIEW OF SYSTEMS
[Loss Of Hearing] : hearing loss [Lower Ext Edema] : lower extremity edema [Confused] : confusion [Chills] : no chills [Fever] : no fever [Feeling Poorly] : not feeling poorly [Feeling Tired] : not feeling tired [Eye Pain] : no eye pain [Red Eyes] : eyes not red [Eyesight Problems] : no eyesight problems [Dry Eyes] : no dryness of the eyes [Discharge From Eyes] : no purulent discharge from the eyes [Earache] : no earache [Nosebleeds] : no nosebleeds [Nasal Discharge] : no nasal discharge [Sore Throat] : no sore throat [Heart Rate Is Slow] : the heart rate was not slow [Heart Rate Is Fast] : the heart rate was not fast [Chest Pain] : no chest pain [Palpitations] : no palpitations [Leg Claudication] : no intermittent leg claudication [Shortness Of Breath] : no shortness of breath [Wheezing] : no wheezing [Cough] : no cough [SOB on Exertion] : no shortness of breath during exertion [Orthopnea] : no orthopnea [PND] : no PND [Vomiting] : no vomiting [Abdominal Pain] : no abdominal pain [Constipation] : no constipation [Diarrhea] : no diarrhea [Heartburn] : no heartburn [Dysuria] : no dysuria [Incontinence] : no incontinence [Pelvic Pain] : no pelvic pain [Dysmenorrhea] : no dysmenorrhea [Joint Pain] : no joint pain [Arthralgias] : no arthralgias [Joint Swelling] : no joint swelling [Joint Stiffness] : no joint stiffness [Limb Pain] : no limb pain [Skin Lesions] : no skin lesions [Skin Wound] : no skin wound [Itching] : no itching [Change In A Mole] : no change in a mole [Dizziness] : no dizziness [Convulsions] : no convulsions [Fainting] : no fainting

## 2020-02-20 ENCOUNTER — LABORATORY RESULT (OUTPATIENT)
Age: 85
End: 2020-02-20

## 2020-03-27 ENCOUNTER — RX RENEWAL (OUTPATIENT)
Age: 85
End: 2020-03-27

## 2020-07-04 ENCOUNTER — APPOINTMENT (OUTPATIENT)
Dept: GERIATRICS | Facility: HOME HEALTH | Age: 85
End: 2020-07-04
Payer: MEDICARE

## 2020-07-04 PROCEDURE — 99349 HOME/RES VST EST MOD MDM 40: CPT

## 2020-07-05 ENCOUNTER — EMERGENCY (EMERGENCY)
Facility: HOSPITAL | Age: 85
LOS: 1 days | End: 2020-07-05
Admitting: EMERGENCY MEDICINE
Payer: MEDICARE

## 2020-07-05 DIAGNOSIS — Z98.890 OTHER SPECIFIED POSTPROCEDURAL STATES: Chronic | ICD-10-CM

## 2020-07-05 PROCEDURE — 74177 CT ABD & PELVIS W/CONTRAST: CPT | Mod: 26

## 2020-07-05 PROCEDURE — 99285 EMERGENCY DEPT VISIT HI MDM: CPT | Mod: CS

## 2020-07-05 PROCEDURE — 71275 CT ANGIOGRAPHY CHEST: CPT | Mod: 26

## 2020-07-05 PROCEDURE — 71045 X-RAY EXAM CHEST 1 VIEW: CPT | Mod: 26

## 2020-07-14 ENCOUNTER — APPOINTMENT (OUTPATIENT)
Dept: GERIATRICS | Facility: CLINIC | Age: 85
End: 2020-07-14
Payer: MEDICARE

## 2020-07-14 PROCEDURE — 99348 HOME/RES VST EST LOW MDM 30: CPT

## 2020-07-20 ENCOUNTER — TRANSCRIPTION ENCOUNTER (OUTPATIENT)
Age: 85
End: 2020-07-20

## 2020-07-22 LAB
ALBUMIN SERPL ELPH-MCNC: 3.2 G/DL
ALP BLD-CCNC: 167 U/L
ALT SERPL-CCNC: 29 U/L
ANION GAP SERPL CALC-SCNC: 15 MMOL/L
AST SERPL-CCNC: 41 U/L
BASOPHILS # BLD AUTO: 0.05 K/UL
BASOPHILS NFR BLD AUTO: 0.4 %
BILIRUB SERPL-MCNC: 0.4 MG/DL
BUN SERPL-MCNC: 23 MG/DL
CALCIUM SERPL-MCNC: 9.4 MG/DL
CHLORIDE SERPL-SCNC: 101 MMOL/L
CO2 SERPL-SCNC: 23 MMOL/L
CREAT SERPL-MCNC: 1.05 MG/DL
EOSINOPHIL # BLD AUTO: 0.06 K/UL
EOSINOPHIL NFR BLD AUTO: 0.5 %
GLUCOSE SERPL-MCNC: 107 MG/DL
HCT VFR BLD CALC: 37 %
HGB BLD-MCNC: 11.3 G/DL
IMM GRANULOCYTES NFR BLD AUTO: 2.4 %
LYMPHOCYTES # BLD AUTO: 2.86 K/UL
LYMPHOCYTES NFR BLD AUTO: 23.8 %
MAN DIFF?: NORMAL
MCHC RBC-ENTMCNC: 29.8 PG
MCHC RBC-ENTMCNC: 30.5 GM/DL
MCV RBC AUTO: 97.6 FL
MONOCYTES # BLD AUTO: 1.07 K/UL
MONOCYTES NFR BLD AUTO: 8.9 %
NEUTROPHILS # BLD AUTO: 7.69 K/UL
NEUTROPHILS NFR BLD AUTO: 64 %
PLATELET # BLD AUTO: 408 K/UL
POTASSIUM SERPL-SCNC: 5 MMOL/L
PROT SERPL-MCNC: 6.7 G/DL
RBC # BLD: 3.79 M/UL
RBC # FLD: 13.8 %
SODIUM SERPL-SCNC: 139 MMOL/L
WBC # FLD AUTO: 12.02 K/UL

## 2020-08-07 RX ORDER — CIPROFLOXACIN HYDROCHLORIDE 500 MG/1
500 TABLET, FILM COATED ORAL TWICE DAILY
Qty: 14 | Refills: 1 | Status: COMPLETED | COMMUNITY
Start: 2020-08-07 | End: 2020-08-21

## 2020-08-17 ENCOUNTER — INPATIENT (INPATIENT)
Facility: HOSPITAL | Age: 85
LOS: 6 days | Discharge: HOME | DRG: 872 | End: 2020-08-24
Attending: INTERNAL MEDICINE | Admitting: INTERNAL MEDICINE
Payer: MEDICARE

## 2020-08-17 VITALS
SYSTOLIC BLOOD PRESSURE: 117 MMHG | DIASTOLIC BLOOD PRESSURE: 71 MMHG | TEMPERATURE: 98 F | RESPIRATION RATE: 24 BRPM | HEART RATE: 90 BPM | OXYGEN SATURATION: 97 %

## 2020-08-17 DIAGNOSIS — R10.9 UNSPECIFIED ABDOMINAL PAIN: ICD-10-CM

## 2020-08-17 DIAGNOSIS — Z02.9 ENCOUNTER FOR ADMINISTRATIVE EXAMINATIONS, UNSPECIFIED: ICD-10-CM

## 2020-08-17 DIAGNOSIS — M79.604 PAIN IN RIGHT LEG: ICD-10-CM

## 2020-08-17 DIAGNOSIS — Z00.00 ENCOUNTER FOR GENERAL ADULT MEDICAL EXAMINATION WITHOUT ABNORMAL FINDINGS: ICD-10-CM

## 2020-08-17 DIAGNOSIS — I10 ESSENTIAL (PRIMARY) HYPERTENSION: ICD-10-CM

## 2020-08-17 DIAGNOSIS — F03.90 UNSPECIFIED DEMENTIA WITHOUT BEHAVIORAL DISTURBANCE: ICD-10-CM

## 2020-08-17 DIAGNOSIS — Z98.890 OTHER SPECIFIED POSTPROCEDURAL STATES: Chronic | ICD-10-CM

## 2020-08-17 DIAGNOSIS — R53.1 WEAKNESS: ICD-10-CM

## 2020-08-17 DIAGNOSIS — N39.0 URINARY TRACT INFECTION, SITE NOT SPECIFIED: ICD-10-CM

## 2020-08-17 LAB
ALBUMIN SERPL ELPH-MCNC: 2.7 G/DL — LOW (ref 3.3–5)
ALP SERPL-CCNC: 158 U/L — HIGH (ref 40–120)
ALT FLD-CCNC: 21 U/L — SIGNIFICANT CHANGE UP (ref 10–45)
ANION GAP SERPL CALC-SCNC: 18 MMOL/L — HIGH (ref 5–17)
AST SERPL-CCNC: 32 U/L — SIGNIFICANT CHANGE UP (ref 10–40)
BILIRUB SERPL-MCNC: 0.4 MG/DL — SIGNIFICANT CHANGE UP (ref 0.2–1.2)
BUN SERPL-MCNC: 15 MG/DL — SIGNIFICANT CHANGE UP (ref 7–23)
CALCIUM SERPL-MCNC: 9.8 MG/DL — SIGNIFICANT CHANGE UP (ref 8.4–10.5)
CHLORIDE SERPL-SCNC: 106 MMOL/L — SIGNIFICANT CHANGE UP (ref 96–108)
CO2 SERPL-SCNC: 20 MMOL/L — LOW (ref 22–31)
CREAT SERPL-MCNC: 0.78 MG/DL — SIGNIFICANT CHANGE UP (ref 0.5–1.3)
GLUCOSE SERPL-MCNC: 113 MG/DL — HIGH (ref 70–99)
HCT VFR BLD CALC: 29.7 % — LOW (ref 34.5–45)
HGB BLD-MCNC: 9.2 G/DL — LOW (ref 11.5–15.5)
MCHC RBC-ENTMCNC: 28.5 PG — SIGNIFICANT CHANGE UP (ref 27–34)
MCHC RBC-ENTMCNC: 31 GM/DL — LOW (ref 32–36)
MCV RBC AUTO: 92 FL — SIGNIFICANT CHANGE UP (ref 80–100)
NRBC # BLD: 0 /100 WBCS — SIGNIFICANT CHANGE UP (ref 0–0)
PLATELET # BLD AUTO: 396 K/UL — SIGNIFICANT CHANGE UP (ref 150–400)
POTASSIUM SERPL-MCNC: 3.8 MMOL/L — SIGNIFICANT CHANGE UP (ref 3.5–5.3)
POTASSIUM SERPL-SCNC: 3.8 MMOL/L — SIGNIFICANT CHANGE UP (ref 3.5–5.3)
PROCALCITONIN SERPL-MCNC: 0.29 NG/ML — HIGH (ref 0.02–0.1)
PROT SERPL-MCNC: 7.2 G/DL — SIGNIFICANT CHANGE UP (ref 6–8.3)
RBC # BLD: 3.23 M/UL — LOW (ref 3.8–5.2)
RBC # FLD: 15.8 % — HIGH (ref 10.3–14.5)
SARS-COV-2 RNA SPEC QL NAA+PROBE: SIGNIFICANT CHANGE UP
SODIUM SERPL-SCNC: 144 MMOL/L — SIGNIFICANT CHANGE UP (ref 135–145)
WBC # BLD: 17.69 K/UL — HIGH (ref 3.8–10.5)
WBC # FLD AUTO: 17.69 K/UL — HIGH (ref 3.8–10.5)

## 2020-08-17 PROCEDURE — 99223 1ST HOSP IP/OBS HIGH 75: CPT | Mod: GC

## 2020-08-17 PROCEDURE — 71045 X-RAY EXAM CHEST 1 VIEW: CPT | Mod: 26

## 2020-08-17 RX ORDER — ESCITALOPRAM OXALATE 10 MG/1
5 TABLET, FILM COATED ORAL DAILY
Refills: 0 | Status: DISCONTINUED | OUTPATIENT
Start: 2020-08-17 | End: 2020-08-24

## 2020-08-17 RX ORDER — CEFTRIAXONE 500 MG/1
1000 INJECTION, POWDER, FOR SOLUTION INTRAMUSCULAR; INTRAVENOUS EVERY 12 HOURS
Refills: 0 | Status: DISCONTINUED | OUTPATIENT
Start: 2020-08-17 | End: 2020-08-18

## 2020-08-17 RX ORDER — CIPROFLOXACIN LACTATE 400MG/40ML
500 VIAL (ML) INTRAVENOUS EVERY 12 HOURS
Refills: 0 | Status: DISCONTINUED | OUTPATIENT
Start: 2020-08-17 | End: 2020-08-17

## 2020-08-17 RX ORDER — ACETAMINOPHEN 500 MG
650 TABLET ORAL EVERY 6 HOURS
Refills: 0 | Status: DISCONTINUED | OUTPATIENT
Start: 2020-08-17 | End: 2020-08-22

## 2020-08-17 RX ORDER — MORPHINE SULFATE 50 MG/1
1 CAPSULE, EXTENDED RELEASE ORAL EVERY 4 HOURS
Refills: 0 | Status: DISCONTINUED | OUTPATIENT
Start: 2020-08-17 | End: 2020-08-18

## 2020-08-17 RX ORDER — HEPARIN SODIUM 5000 [USP'U]/ML
5000 INJECTION INTRAVENOUS; SUBCUTANEOUS EVERY 8 HOURS
Refills: 0 | Status: DISCONTINUED | OUTPATIENT
Start: 2020-08-17 | End: 2020-08-18

## 2020-08-17 RX ORDER — DONEPEZIL HYDROCHLORIDE 10 MG/1
10 TABLET, FILM COATED ORAL AT BEDTIME
Refills: 0 | Status: DISCONTINUED | OUTPATIENT
Start: 2020-08-17 | End: 2020-08-24

## 2020-08-17 RX ADMIN — ESCITALOPRAM OXALATE 5 MILLIGRAM(S): 10 TABLET, FILM COATED ORAL at 21:25

## 2020-08-17 RX ADMIN — HEPARIN SODIUM 5000 UNIT(S): 5000 INJECTION INTRAVENOUS; SUBCUTANEOUS at 21:24

## 2020-08-17 RX ADMIN — DONEPEZIL HYDROCHLORIDE 10 MILLIGRAM(S): 10 TABLET, FILM COATED ORAL at 21:24

## 2020-08-17 RX ADMIN — CEFTRIAXONE 100 MILLIGRAM(S): 500 INJECTION, POWDER, FOR SOLUTION INTRAMUSCULAR; INTRAVENOUS at 21:24

## 2020-08-17 NOTE — H&P ADULT - NSHPSOCIALHISTORY_GEN_ALL_CORE
Lives with son. Has HHA that assists with ADLs. Ambulatory with cane. Former smoker, quit 50 years ago. No alcohol use

## 2020-08-17 NOTE — H&P ADULT - ATTENDING COMMENTS
I have personally seen and examined this patient and agree with the above assessment and plan, which I have reviewed and edited where appropriate.   Case discussed in detail with Dr. Hood.  Work up in progress for right sided abdominal pain, decline in status.

## 2020-08-17 NOTE — CHART NOTE - NSCHARTNOTEFT_GEN_A_CORE
Called by RN for patient with abdominal pain during bladder scan. Patient comfortable after bladder scan. Will start Tylenol 650mg q6 PRN mild pain and if patient experiences moderate to severe pain morphine 1mg q4 prn. Will reassess need for opiates in AM.

## 2020-08-17 NOTE — H&P ADULT - PROBLEM SELECTOR PLAN 8
Transitions of Care Status:  1.  Name of PCP: Dr. Hood   2.  PCP Contacted on Admission: [x] Y    [ ] N    3.  PCP contacted at Discharge: [ ] Y    [ ] N    [ ] N/A  4.  Post-Discharge Appointment Date and Location:  5.  Summary of Handoff given to PCP:

## 2020-08-17 NOTE — H&P ADULT - PROBLEM SELECTOR PLAN 1
- Pt w/ recent imaging at [facility] found to have L superior ramus fracture, sacral fractures, and R VUR (seen by Urology, no surgical intervention currently being planned due to pt's "dysfunctional right kidney")   - Family interested in more conservative management but would like to address any reversible causes of pain  - F/u CBC, CMP. Consider abdominal imaging pending Cr - Pt w/ recent imaging at [place] found to have L pubic ramus fracture, R sacral fracture, and R UPJ dilation size of grapefruit due to obstruction with R kidney chronically shriveled Dr. Hood spoke to Urology, no surgical intervention currently being planned due to shriveled kidney state   - Family interested in more conservative management but would like to address any reversible causes of pain  - F/u CBC, CMP. Consider abdominal imaging pending Cr

## 2020-08-17 NOTE — H&P ADULT - ASSESSMENT
This is a 87-year-old female with HTN, dementia, recent hospitalization at [facility] found to have L superior ramus fracture, sacral fractures, and R VUR (seen by Urology, no surgical intervention currently being planned), recent UTI, presenting with abdominal pain and decline in functional status over the last month. Pt admitted for management of pain. This is a 87-year-old female with HTN, dementia, recent hospitalization at [place] found to have L pubic ramus fracture, R sacral fracture, and R UPJ dilation, recent UTI, presenting with abdominal pain and decline in functional status over the last month. Pt admitted for management of pain.

## 2020-08-17 NOTE — H&P ADULT - NSHPLABSRESULTS_GEN_ALL_CORE
Pending 9.2    17.69 )-----------( 396      ( 17 Aug 2020 15:40 )             29.7     08-17    144  |  106  |  15  ----------------------------<  113<H>  3.8   |  20<L>  |  0.78    Ca    9.8      17 Aug 2020 15:40    TPro  7.2  /  Alb  2.7<L>  /  TBili  0.4  /  DBili  x   /  AST  32  /  ALT  21  /  AlkPhos  158<H>  08-17

## 2020-08-17 NOTE — H&P ADULT - PROBLEM SELECTOR PLAN 4
- Pt w/ recent UTI, currently on cipro 6/7  - C/w cipro to complete 7 day course (to end 8/18)  - F/u U/A

## 2020-08-17 NOTE — PATIENT PROFILE ADULT - HARM RISK FACTORS
Patient aware of message below      ----- Message from Kristy Leslie NP sent at 1/9/2019  7:28 AM CST -----  Please inform:   - Vitamin D is low: script sent to pharmacy for Vit D 50,000 units po weekly x 12 weeks; after completed then to switch to Vit D3 2000 unit po daily purchased over-the-counter  - CRP and SED rate (marks of inflammation) are essentially unremarkable. yes

## 2020-08-17 NOTE — H&P ADULT - HISTORY OF PRESENT ILLNESS
This is a 87-year-old female with HTN, dementia, recent hospitalization at [facility] found to have L superior ramus fracture, sacral fractures, and R VUR (seen by Urology, no surgical intervention currently being planned), sent by. Dr. Hood for abdominal pain and recent decline in functional status. As per outpatient provider Dr. Hood, pt had a vertebral compression fracture 1 year ago with no loss of function at that time. Pt's baseline status is ambulatory with a cane, has a HHA that assists with ADLs, lives with son, dementia with significant short term memory loss. Patient then developed pain and decline in functional status 1 month ago. Pt was seen at [facility] and diagnosed with sciatica and sent home. On review of imaging, pt found to have L superior ramus fracture, sacral fractures, R VUR. Dr. Hood spoke to Urology who advised against surgery given pt's "dysfunctional right kidney;" family also opting for more conservative management but would like to address any reversible causes of pain. At that time, labs reported to be WNL.     As per son, pt has been having decreased PO intake for the last month along with loss of function. Pt found to have UTI outpatient, currently on cipro day 6/7; son also reports that pt was getting PPX antibiotics 3 times a week.  Pt has had abdominal pain for which they were treating with Tylenol. Also reports that pt has appeared more dyspneic lately. No melena, hematochezia, cough, diarrhea, constipation, fever.     In the ED, pt's vitals were T 97.5 HR 90 /71 RR 24 POX 94% 2L NC. This is a 87-year-old female with HTN, dementia, recent hospitalization at [facility] found to have L superior ramus fracture, sacral fractures, and R VUR (seen by Urology), sent by. Dr. Hood for abdominal pain and recent decline in functional status. As per outpatient provider Dr. Hood, pt had a vertebral compression fracture 1 year ago with no loss of function at that time. Pt's baseline status is ambulatory with a cane, has a HHA that assists with ADLs, lives with son, dementia with significant short term memory loss. Patient then developed pain and decline in functional status 1 month ago. Pt was seen at [facility] and diagnosed with sciatica and sent home. On review of imaging, pt found to have L superior ramus fracture, sacral fractures, R VUR. Dr. Hood spoke to Urology who advised against surgery given pt's "dysfunctional right kidney;" family also opting for more conservative management but would like to address any reversible causes of pain. At that time, labs reported to be WNL.     As per son, pt has been having decreased PO intake for the last month along with loss of function. Pt found to have UTI outpatient, currently on cipro day 6/7; son also reports that pt was getting PPX antibiotics 3 times a week.  Pt has had abdominal pain for which they were treating with Tylenol. Also reports that pt has appeared more dyspneic lately. No melena, hematochezia, cough, diarrhea, constipation, fever.     In the ED, pt's vitals were T 97.5 HR 90 /71 RR 24 POX 94% 2L NC. This is a 87-year-old female with HTN, dementia, recent hospitalization at [facility] found to have L superior ramus fracture, sacral fractures, and R VUR (seen by Urology), sent by. Dr. Hood for abdominal pain and recent decline in functional status. As per outpatient provider Dr. Hood, pt had a vertebral compression fracture 1 year ago with no loss of function at that time. However one month ago, patient developed abdominal pain and decline in functional status.  Pt was seen at [facility] and diagnosed with sciatica and sent home. On review of imaging, pt found to have L superior ramus fracture, sacral fractures, R VUR. Dr. Hood spoke to Urology who advised against surgery given pt's "dysfunctional right kidney;" family also opting for more conservative management but would like to address any reversible causes of pain. During that time, labs and additional imaging reported to be WNL.     As per son, pt has been having decreased PO intake for the last month along with loss of function. Pt found to have UTI outpatient, currently on cipro day 6/7; son also reports that pt was getting PPX antibiotics 3 times a week prior. Pt has had abdominal pain for which they were treating with Tylenol with workup noted above. Also reports that pt has appeared more dyspneic lately. No melena, hematochezia, cough, diarrhea, constipation, fever.     Pt's baseline status is ambulatory with a cane, has a HHA that assists with ADLs, lives with son, dementia with significant short term memory loss.     In the ED, pt's vitals were T 97.5 HR 90 /71 RR 24 POX 94% 2L NC. This is a 87-year-old female with HTN, dementia, recent hospitalization at [facility] found to have L superior ramus fracture, sacral fractures, and R VUR (seen by Urology), sent by. Dr. Hood for abdominal pain and recent decline in functional status. As per outpatient provider Dr. Hood, pt had a vertebral compression fracture 1 year ago with no loss of function at that time. However one month ago, patient developed abdominal pain and decline in functional status.  Pt was seen at [facility] and diagnosed with sciatica and sent home. On review of imaging, pt found to have L pubic ramus fracture, R sacral fracture, and R UPJ dilation size of grapefruit due to obstruction with R kidney chronically shriveled. Dr. Hood spoke to Urology who advised against surgery given pt's chronically shriveled R kidney; family also opting for more conservative management but would like to address any reversible causes of pain. During that time, labs and additional imaging reported to be WNL.     As per son, pt has been having decreased PO intake for the last month along with loss of function. Pt found to have UTI outpatient, currently on cipro day 6/7; son also reports that pt was getting PPX antibiotics 3 times a week prior. Pt has had abdominal pain for which they were treating with Tylenol with workup noted above. Also reports that pt has appeared more dyspneic lately. No melena, hematochezia, cough, diarrhea, constipation, fever.     Pt's baseline status is ambulatory with a cane, has a HHA that assists with ADLs, lives with son, dementia with significant short term memory loss.     In the ED, pt's vitals were T 97.5 HR 90 /71 RR 24 POX 94% 2L NC. This is a 87-year-old female with HTN, dementia, recent hospitalization at [place] found to have L pubic ramus fracture, R sacral fracture, and R UPJ dilation, sent by. Dr. Hood for abdominal pain and recent decline in functional status. As per outpatient provider Dr. Hood, pt had a vertebral compression fracture 1 year ago with no loss of function at that time. However one month ago, patient developed abdominal pain and decline in functional status.  Pt was seen at [place] and diagnosed with sciatica and sent home. On review of imaging, pt found to have L pubic ramus fracture, R sacral fracture, and R UPJ dilation size of grapefruit due to obstruction with R kidney chronically shriveled. Dr. Hood spoke to Urology who advised against surgery given pt's chronically shriveled R kidney; family also opting for more conservative management but would like to address any reversible causes of pain. During that time, labs and additional imaging reported to be WNL.     As per son, pt has been having decreased PO intake for the last month along with loss of function. Pt found to have UTI outpatient, currently on cipro day 6/7; son also reports that pt was getting PPX antibiotics 3 times a week prior. Pt has had abdominal pain for which they were treating with Tylenol with workup noted above. Also reports that pt has appeared more dyspneic lately. No melena, hematochezia, cough, diarrhea, constipation, fever.     Pt's baseline status is ambulatory with a cane, has a HHA that assists with ADLs, lives with son, dementia with significant short term memory loss.     In the ED, pt's vitals were T 97.5 HR 90 /71 RR 24 POX 94% 2L NC.

## 2020-08-17 NOTE — H&P ADULT - NSHPPHYSICALEXAM_GEN_ALL_CORE
Vital Signs Last 24 Hrs  T(C): 36.4 (17 Aug 2020 14:34), Max: 36.4 (17 Aug 2020 14:34)  T(F): 97.5 (17 Aug 2020 14:34), Max: 97.5 (17 Aug 2020 14:34)  HR: 90 (17 Aug 2020 14:34) (90 - 90)  BP: 117/71 (17 Aug 2020 14:34) (117/71 - 117/71)  BP(mean): --  RR: 24 (17 Aug 2020 14:34) (24 - 24)  SpO2: 97% (17 Aug 2020 14:34) (97% - 97%)    PHYSICAL EXAM:  GENERAL: NAD, sitting up in bed   HEAD:  Atraumatic, Normocephalic  EYES: Conjunctiva and sclera clear  ENT: Dry mucous membranes  NECK: Supple  CHEST/LUNG: Clear to auscultation bilaterally; No rales, rhonchi, wheezing, or rubs. Unlabored respirations  HEART: Regular rate and rhythm; No murmurs  ABDOMEN: Mass appreciated on right abdomen. Normoactive bowel sounds   EXTREMITIES:  2+ Peripheral Pulses, brisk capillary refill. No lower extremity edema   NERVOUS SYSTEM:  Alert & Oriented X1, speech clear   MSK: +RLE tenderness, no swelling appreciated   SKIN: No rashes

## 2020-08-17 NOTE — H&P ADULT - PROBLEM SELECTOR PLAN 2
- Pt w/ recent decline in functional status over the last month, less ambulatory. Prior to that ambulated with cane  - Consider PT eval   - Supportive care

## 2020-08-17 NOTE — H&P ADULT - PROBLEM SELECTOR PLAN 7
Diet: regular  DVT PPX: heparin SQ   Dispo: pending clinical improvement Diet: regular  DVT PPX: heparin SQ   GOC: DNR/DNI   Dispo: pending clinical improvement

## 2020-08-18 ENCOUNTER — APPOINTMENT (OUTPATIENT)
Dept: GERIATRICS | Facility: CLINIC | Age: 85
End: 2020-08-18

## 2020-08-18 DIAGNOSIS — A41.9 SEPSIS, UNSPECIFIED ORGANISM: ICD-10-CM

## 2020-08-18 DIAGNOSIS — D64.9 ANEMIA, UNSPECIFIED: ICD-10-CM

## 2020-08-18 DIAGNOSIS — R33.9 RETENTION OF URINE, UNSPECIFIED: ICD-10-CM

## 2020-08-18 LAB
ANION GAP SERPL CALC-SCNC: 14 MMOL/L — SIGNIFICANT CHANGE UP (ref 5–17)
APPEARANCE UR: CLEAR — SIGNIFICANT CHANGE UP
APTT BLD: 29.5 SEC — SIGNIFICANT CHANGE UP (ref 27.5–35.5)
BASOPHILS # BLD AUTO: 0.07 K/UL — SIGNIFICANT CHANGE UP (ref 0–0.2)
BASOPHILS NFR BLD AUTO: 0.4 % — SIGNIFICANT CHANGE UP (ref 0–2)
BILIRUB UR-MCNC: NEGATIVE — SIGNIFICANT CHANGE UP
BLD GP AB SCN SERPL QL: NEGATIVE — SIGNIFICANT CHANGE UP
BUN SERPL-MCNC: 12 MG/DL — SIGNIFICANT CHANGE UP (ref 7–23)
CALCIUM SERPL-MCNC: 8.9 MG/DL — SIGNIFICANT CHANGE UP (ref 8.4–10.5)
CHLORIDE SERPL-SCNC: 103 MMOL/L — SIGNIFICANT CHANGE UP (ref 96–108)
CO2 SERPL-SCNC: 22 MMOL/L — SIGNIFICANT CHANGE UP (ref 22–31)
COLOR SPEC: YELLOW — SIGNIFICANT CHANGE UP
CREAT SERPL-MCNC: 0.7 MG/DL — SIGNIFICANT CHANGE UP (ref 0.5–1.3)
CULTURE RESULTS: SIGNIFICANT CHANGE UP
DIFF PNL FLD: NEGATIVE — SIGNIFICANT CHANGE UP
EOSINOPHIL # BLD AUTO: 0.12 K/UL — SIGNIFICANT CHANGE UP (ref 0–0.5)
EOSINOPHIL NFR BLD AUTO: 0.7 % — SIGNIFICANT CHANGE UP (ref 0–6)
ERYTHROCYTE [SEDIMENTATION RATE] IN BLOOD: 120 MM/HR — HIGH (ref 0–20)
GLUCOSE SERPL-MCNC: 114 MG/DL — HIGH (ref 70–99)
GLUCOSE UR QL: NEGATIVE — SIGNIFICANT CHANGE UP
HCT VFR BLD CALC: 27.8 % — LOW (ref 34.5–45)
HGB BLD-MCNC: 8.6 G/DL — LOW (ref 11.5–15.5)
IMM GRANULOCYTES NFR BLD AUTO: 4.7 % — HIGH (ref 0–1.5)
INR BLD: 1.31 RATIO — HIGH (ref 0.88–1.16)
KETONES UR-MCNC: NEGATIVE — SIGNIFICANT CHANGE UP
LEUKOCYTE ESTERASE UR-ACNC: NEGATIVE — SIGNIFICANT CHANGE UP
LYMPHOCYTES # BLD AUTO: 16.7 % — SIGNIFICANT CHANGE UP (ref 13–44)
LYMPHOCYTES # BLD AUTO: 3.09 K/UL — SIGNIFICANT CHANGE UP (ref 1–3.3)
MAGNESIUM SERPL-MCNC: 1.8 MG/DL — SIGNIFICANT CHANGE UP (ref 1.6–2.6)
MCHC RBC-ENTMCNC: 28.4 PG — SIGNIFICANT CHANGE UP (ref 27–34)
MCHC RBC-ENTMCNC: 30.9 GM/DL — LOW (ref 32–36)
MCV RBC AUTO: 91.7 FL — SIGNIFICANT CHANGE UP (ref 80–100)
MONOCYTES # BLD AUTO: 1.34 K/UL — HIGH (ref 0–0.9)
MONOCYTES NFR BLD AUTO: 7.3 % — SIGNIFICANT CHANGE UP (ref 2–14)
NEUTROPHILS # BLD AUTO: 12.98 K/UL — HIGH (ref 1.8–7.4)
NEUTROPHILS NFR BLD AUTO: 70.2 % — SIGNIFICANT CHANGE UP (ref 43–77)
NITRITE UR-MCNC: NEGATIVE — SIGNIFICANT CHANGE UP
NRBC # BLD: 0 /100 WBCS — SIGNIFICANT CHANGE UP (ref 0–0)
PH UR: 6 — SIGNIFICANT CHANGE UP (ref 5–8)
PHOSPHATE SERPL-MCNC: 2.3 MG/DL — LOW (ref 2.5–4.5)
PLATELET # BLD AUTO: 360 K/UL — SIGNIFICANT CHANGE UP (ref 150–400)
POTASSIUM SERPL-MCNC: 3.5 MMOL/L — SIGNIFICANT CHANGE UP (ref 3.5–5.3)
POTASSIUM SERPL-SCNC: 3.5 MMOL/L — SIGNIFICANT CHANGE UP (ref 3.5–5.3)
PROT UR-MCNC: SIGNIFICANT CHANGE UP
PROTHROM AB SERPL-ACNC: 15.4 SEC — HIGH (ref 10.6–13.6)
RBC # BLD: 3.03 M/UL — LOW (ref 3.8–5.2)
RBC # FLD: 15.8 % — HIGH (ref 10.3–14.5)
RH IG SCN BLD-IMP: POSITIVE — SIGNIFICANT CHANGE UP
SARS-COV-2 IGG SERPL IA-ACNC: 0.6 RATIO — SIGNIFICANT CHANGE UP
SARS-COV-2 IGG SERPL QL IA: NEGATIVE — SIGNIFICANT CHANGE UP
SARS-COV-2 IGG SERPL QL IA: NEGATIVE — SIGNIFICANT CHANGE UP
SARS-COV-2 IGM SERPL IA-ACNC: 0.39 RATIO — SIGNIFICANT CHANGE UP
SODIUM SERPL-SCNC: 139 MMOL/L — SIGNIFICANT CHANGE UP (ref 135–145)
SP GR SPEC: 1.03 — HIGH (ref 1.01–1.02)
SPECIMEN SOURCE: SIGNIFICANT CHANGE UP
UROBILINOGEN FLD QL: SIGNIFICANT CHANGE UP
WBC # BLD: 18.46 K/UL — HIGH (ref 3.8–10.5)
WBC # FLD AUTO: 18.46 K/UL — HIGH (ref 3.8–10.5)

## 2020-08-18 PROCEDURE — 74177 CT ABD & PELVIS W/CONTRAST: CPT | Mod: 26

## 2020-08-18 PROCEDURE — 99233 SBSQ HOSP IP/OBS HIGH 50: CPT | Mod: GC

## 2020-08-18 RX ORDER — SODIUM CHLORIDE 9 MG/ML
1000 INJECTION, SOLUTION INTRAVENOUS
Refills: 0 | Status: DISCONTINUED | OUTPATIENT
Start: 2020-08-18 | End: 2020-08-20

## 2020-08-18 RX ORDER — SENNA PLUS 8.6 MG/1
2 TABLET ORAL AT BEDTIME
Refills: 0 | Status: DISCONTINUED | OUTPATIENT
Start: 2020-08-18 | End: 2020-08-24

## 2020-08-18 RX ORDER — PIPERACILLIN AND TAZOBACTAM 4; .5 G/20ML; G/20ML
3.38 INJECTION, POWDER, LYOPHILIZED, FOR SOLUTION INTRAVENOUS EVERY 8 HOURS
Refills: 0 | Status: DISCONTINUED | OUTPATIENT
Start: 2020-08-18 | End: 2020-08-19

## 2020-08-18 RX ORDER — MORPHINE SULFATE 50 MG/1
2 CAPSULE, EXTENDED RELEASE ORAL ONCE
Refills: 0 | Status: DISCONTINUED | OUTPATIENT
Start: 2020-08-18 | End: 2020-08-18

## 2020-08-18 RX ORDER — SODIUM,POTASSIUM PHOSPHATES 278-250MG
1 POWDER IN PACKET (EA) ORAL
Refills: 0 | Status: DISCONTINUED | OUTPATIENT
Start: 2020-08-18 | End: 2020-08-19

## 2020-08-18 RX ORDER — PIPERACILLIN AND TAZOBACTAM 4; .5 G/20ML; G/20ML
3.38 INJECTION, POWDER, LYOPHILIZED, FOR SOLUTION INTRAVENOUS ONCE
Refills: 0 | Status: COMPLETED | OUTPATIENT
Start: 2020-08-18 | End: 2020-08-18

## 2020-08-18 RX ORDER — MORPHINE SULFATE 50 MG/1
1 CAPSULE, EXTENDED RELEASE ORAL EVERY 4 HOURS
Refills: 0 | Status: DISCONTINUED | OUTPATIENT
Start: 2020-08-18 | End: 2020-08-24

## 2020-08-18 RX ORDER — LANOLIN ALCOHOL/MO/W.PET/CERES
3 CREAM (GRAM) TOPICAL AT BEDTIME
Refills: 0 | Status: DISCONTINUED | OUTPATIENT
Start: 2020-08-18 | End: 2020-08-24

## 2020-08-18 RX ADMIN — CEFTRIAXONE 100 MILLIGRAM(S): 500 INJECTION, POWDER, FOR SOLUTION INTRAMUSCULAR; INTRAVENOUS at 08:55

## 2020-08-18 RX ADMIN — MORPHINE SULFATE 2 MILLIGRAM(S): 50 CAPSULE, EXTENDED RELEASE ORAL at 13:13

## 2020-08-18 RX ADMIN — PIPERACILLIN AND TAZOBACTAM 25 GRAM(S): 4; .5 INJECTION, POWDER, LYOPHILIZED, FOR SOLUTION INTRAVENOUS at 21:50

## 2020-08-18 RX ADMIN — MORPHINE SULFATE 1 MILLIGRAM(S): 50 CAPSULE, EXTENDED RELEASE ORAL at 10:15

## 2020-08-18 RX ADMIN — MORPHINE SULFATE 1 MILLIGRAM(S): 50 CAPSULE, EXTENDED RELEASE ORAL at 10:37

## 2020-08-18 RX ADMIN — PIPERACILLIN AND TAZOBACTAM 200 GRAM(S): 4; .5 INJECTION, POWDER, LYOPHILIZED, FOR SOLUTION INTRAVENOUS at 13:28

## 2020-08-18 RX ADMIN — HEPARIN SODIUM 5000 UNIT(S): 5000 INJECTION INTRAVENOUS; SUBCUTANEOUS at 13:15

## 2020-08-18 RX ADMIN — HEPARIN SODIUM 5000 UNIT(S): 5000 INJECTION INTRAVENOUS; SUBCUTANEOUS at 05:32

## 2020-08-18 RX ADMIN — Medication 3 MILLIGRAM(S): at 21:57

## 2020-08-18 RX ADMIN — SENNA PLUS 2 TABLET(S): 8.6 TABLET ORAL at 21:57

## 2020-08-18 RX ADMIN — SODIUM CHLORIDE 100 MILLILITER(S): 9 INJECTION, SOLUTION INTRAVENOUS at 09:30

## 2020-08-18 RX ADMIN — DONEPEZIL HYDROCHLORIDE 10 MILLIGRAM(S): 10 TABLET, FILM COATED ORAL at 21:57

## 2020-08-18 RX ADMIN — Medication 1 TABLET(S): at 16:44

## 2020-08-18 RX ADMIN — ESCITALOPRAM OXALATE 5 MILLIGRAM(S): 10 TABLET, FILM COATED ORAL at 11:30

## 2020-08-18 RX ADMIN — Medication 10 MILLIGRAM(S): at 12:08

## 2020-08-18 RX ADMIN — MORPHINE SULFATE 2 MILLIGRAM(S): 50 CAPSULE, EXTENDED RELEASE ORAL at 13:37

## 2020-08-18 NOTE — PROGRESS NOTE ADULT - PROBLEM SELECTOR PLAN 3
- Likely in the setting of R UPJ dilation due to obstruction w/ R kidney chronically shriveled   - Required straight cath x2 overnight  - Monitor for need for martinez  - F/u CT abdomen/pelvis  - Urology consult as above - Likely in the setting of R UPJ dilation due to obstruction w/ R kidney chronically shriveled   - Required straight cath x2 overnight  - Monitor for need for martinez  - F/u CT abdomen/pelvis

## 2020-08-18 NOTE — PROGRESS NOTE ADULT - PROBLEM SELECTOR PLAN 4
- Pt w/ significant anterior right leg tenderness on exam  - VA duplex LEs ordered r/o DVT - Pt w/ significant anterior right leg tenderness on exam  - VA duplex LEs ordered r/o DVT, f/u - Hb 9.2 MCV 92 (was normal prior to this month)   - No evidence of bleeding  - Monitor CBC  - Will send iron studies, folate, B12, TSH   - Maintain active T&S

## 2020-08-18 NOTE — PROGRESS NOTE ADULT - ASSESSMENT
This is a 87-year-old female with HTN, dementia, recent hospitalization at [place] found to have L pubic ramus fracture, R sacral fracture, and R UPJ dilation, recent UTI, presenting with abdominal pain and decline in functional status over the last month. Found to have leukocytosis 17.7. Pt admitted for management of pain and sepsis of unclear etiology.

## 2020-08-18 NOTE — PROGRESS NOTE ADULT - PROBLEM SELECTOR PLAN 2
- Pt w/ recent imaging at [place] [facility] found to have L pubic ramus fracture, R sacral fracture, and R UPJ dilation size of grapefruit due to obstruction with R kidney chronically shriveled Dr. Hood spoke to Urology, no surgical intervention currently being planned due to shriveled kidney state   - Family interested in more conservative management but would like to address any reversible causes of pain  - F/u CT abdomen/pelvis  - Urology consult, f/u recs - Pt w/ recent imaging at [facility] found to have L pubic ramus fracture, R sacral fracture, and R UPJ dilation size of grapefruit due to obstruction with R kidney chronically shriveled Dr. Hood spoke to Urology, no surgical intervention currently being planned due to shriveled kidney state   - Family interested in more conservative management but would like to address any reversible causes of pain  - F/u CT abdomen/pelvis  - Urology consult, f/u recs  - Tylenol PRN for pain - Pt w/ recent imaging at [place] found to have L pubic ramus fracture, R sacral fracture, and R UPJ dilation size of grapefruit due to obstruction with R kidney chronically shriveled Dr. Hood spoke to Urology, no surgical intervention currently being planned due to shriveled kidney state   - Family interested in more conservative management but would like to address any reversible causes of pain  - F/u CT abdomen/pelvis  - Tylenol PRN for mild pain, Morphine PRN for moderate-severe pain - Pt w/ recent imaging at [facility] found to have L pubic ramus fracture, R sacral fracture, and R UPJ dilation size of grapefruit due to obstruction with R kidney chronically shriveled Dr. Hood spoke to Urology, no surgical intervention currently being planned due to shriveled kidney state   - Family interested in more conservative management but would like to address any reversible causes of pain  - F/u CT abdomen/pelvis  - Tylenol PRN for mild pain, Morphine 1 mg q4 PRN for moderate-severe pain

## 2020-08-18 NOTE — PROGRESS NOTE ADULT - PROBLEM SELECTOR PLAN 1
- Meets SIRS criteria HR 93, leukocytosis to 17.7. Right-sided abdominal pain in the setting of R UPJ dilation. Unclear source of infection   - Recent tx for UTI (6 days of cipro PO as of 8/17)  - U/A negative, CXR pending official read however no focal consolidation noted, COVID negative    - F/u CT abdomen/pelvis, blood cx, urine cx   - C/w ceftriaxone (8/17 - ) - Meets SIRS criteria HR 93, leukocytosis to 17.7. Right-sided abdominal pain in the setting of R UPJ dilation. Prior diverticulitis. Unclear source of infection currently, likely abdominal    - Recent tx for UTI (6 days of cipro PO as of 8/17)  - U/A negative, CXR clear lungs, COVID negative    - F/u CT abdomen/pelvis, blood cx, urine cx   - C/w ceftriaxone (8/17 - )  - Maintenance fluids with  cc/hr - Meets SIRS criteria HR 93, leukocytosis to 17.7, mildly elevated procalcitonin 0.29. Right-sided abdominal pain in the setting of R UPJ dilation. Prior diverticulitis. Unclear source of infection currently, likely abdominal    - Recent tx for UTI (6 days of cipro PO as of 8/17)  - U/A negative, CXR clear lungs, COVID negative    - F/u CT abdomen/pelvis, blood cx, urine cx   - C/w ceftriaxone (8/17 - )  - Maintenance fluids with  cc/hr

## 2020-08-18 NOTE — PROGRESS NOTE ADULT - PROBLEM SELECTOR PLAN 5
- Pt w/ recent decline in functional status over the last month, less ambulatory. Prior to that ambulated with cane  - Consider PT eval   - Supportive care - Pt w/ significant anterior right leg tenderness on exam  - VA duplex LEs ordered r/o DVT, f/u

## 2020-08-18 NOTE — PROGRESS NOTE ADULT - PROBLEM SELECTOR PLAN 6
- Hb 9.2 MCV 92, unclear baseline  - No evidence of bleeding  - Monitor CBC  - Maintain active T&S - Hb 9.2 MCV 92 (was normal prior to this month)   - No evidence of bleeding  - Monitor CBC  - Will send iron studies, folate, B12, TSH   - Maintain active T&S - Pt w/ recent decline in functional status over the last month, less ambulatory. Prior to that ambulated with cane  - Consider PT eval   - Supportive care

## 2020-08-18 NOTE — CONSULT NOTE ADULT - ASSESSMENT
87F with HTN, dementia, chronic right UPJ obstruction, recent hospitalization at [facility] for L pubic ramus fracture and R sacral fracture presenting with right sided abdominal pain  CT with significantly dilated right collecting system with minimal parenchyma    - Mass effect of this dilated system likely causing/contributing to patient's pain  - Recommend IR consult for R nephrostomy tube placement to decompress kidney   - Patient not a candidate for nephrectomy and family does not want more a more invasive option  - D/w Dr. Doherty 87F with HTN, dementia, right atrophic kidney, recent hospitalization at [place] for L pubic ramus fracture and R sacral fracture presenting with right sided abdominal pain  CT with significantly dilated right collecting system with minimal parenchyma    - Mass effect of this dilated system likely causing/contributing to patient's pain  - Recommend IR consult for R nephrostomy tube placement to decompress kidney   - Patient not a candidate for nephrectomy and family does not want more a more invasive option  - D/w Dr. Doherty 87F with HTN, dementia, right atrophic kidney, recent hospitalization at [place] for L pubic ramus fracture and R sacral fracture presenting with right sided abdominal pain  CT with significantly dilated right collecting system with minimal parenchyma, Ct on 2019 showed an atrophic kidney with no dilation.    - Mass effect of this dilated system likely causing/contributing to patient's pain  - Recommend IR consult for R nephrostomy tube placement to decompress kidney   - Patient not a candidate for nephrectomy and family does not want more a more invasive option  - D/w Dr. Doherty

## 2020-08-18 NOTE — PROGRESS NOTE ADULT - SUBJECTIVE AND OBJECTIVE BOX
GAP TEAM PALLIATIVE CARE UNIT PROGRESS NOTE:      [] Patient on hospice program.    INDICATION FOR PALLIATIVE CARE UNIT SERVICES: Symptom Control, Comfort Measures, GOC    INTERVAL HPI/OVERNIGHT EVENTS: No acute events overnight. Required no PRN pain medications. Overnight bladder scam 500 cc, pt required straight cath x2.     DNR on chart: Yes  Yes    Allergies    No Known Allergies    Intolerances    MEDICATIONS  (STANDING):  cefTRIAXone   IVPB 1000 milliGRAM(s) IV Intermittent every 12 hours  donepezil 10 milliGRAM(s) Oral at bedtime  escitalopram 5 milliGRAM(s) Oral daily  heparin   Injectable 5000 Unit(s) SubCutaneous every 8 hours    MEDICATIONS  (PRN):  acetaminophen   Tablet .. 650 milliGRAM(s) Oral every 6 hours PRN Mild Pain (1 - 3)    ITEMS UNCHECKED ARE NOT PRESENT    PRESENT SYMPTOMS: [x]Unable to obtain due to poor mentation   Source if other than patient:  [x]Family   [x]Team     Pain: [] yes [] no  QOL impact -   Location -                    Aggravating factors -  Quality -  Radiation -  Timing -  Severity (0-10 scale):  Minimal acceptable level (0-10 scale):     Dyspnea:                           []Mild []Moderate []Severe  Anxiety:                             []Mild []Moderate []Severe  Fatigue:                             []Mild []Moderate []Severe  Nausea:                             []Mild []Moderate []Severe  Loss of appetite:              []Mild []Moderate []Severe  Constipation:                    []Mild []Moderate []Severe    PAINAD Score:  http://geriatrictoolkit.[place].[facility]/cog/painad.pdf  		  Other Symptoms:  [x]All other review of systems negative     Karnofsky Performance Score/Palliative Performance Status Version 2:         %  http://npcrc.org/files/news/palliative_performance_scale_ppsv2.pdf    PHYSICAL EXAM:  Vital Signs Last 24 Hrs  T(C): 37.1 (18 Aug 2020 04:01), Max: 37.1 (18 Aug 2020 04:01)  T(F): 98.7 (18 Aug 2020 04:01), Max: 98.7 (18 Aug 2020 04:01)  HR: 93 (18 Aug 2020 04:01) (90 - 93)  BP: 129/67 (18 Aug 2020 04:01) (105/66 - 129/67)  BP(mean): --  RR: 20 (18 Aug 2020 04:02) (18 - 24)  SpO2: 91% (18 Aug 2020 04:02) (91% - 97%) I&O's Summary    17 Aug 2020 07:01  -  18 Aug 2020 07:00  --------------------------------------------------------  IN: 0 mL / OUT: 987 mL / NET: -987 mL      GENERAL:  [x]Alert  [x]Oriented x1   []Lethargic  []Cachexia  []Unarousable  [x]Verbal  []Non-Verbal  Behavioral:   [] Anxiety  [] Delirium [] Agitation [] Other  HEENT:  []Normal   []Dry mouth   []ET Tube/Trach  []Oral lesions  PULMONARY:   [x]Clear []Tachypnea  []Audible excessive secretions   []Rhonchi        []Right []Left []Bilateral  []Crackles        []Right []Left []Bilateral  []Wheezing     []Right []Left []Bilateral  [] Diminished breath sounds     []Right []Left []Bilateral  CARDIOVASCULAR:    [x]Regular []Irregular []Tachy  []Justyn []Murmur []Other  GASTROINTESTINAL: right-sided abdominal mass   [x]Soft []Distended [x]+BS [x]Non tender []Tender []PEG []OGT/ NGT   Last BM:         GENITOURINARY:  []Normal [] Incontinent   []Oliguria/Anuria   []Roth  MUSCULOSKELETAL:   []Normal  [x]Weakness  []Bed/Wheelchair bound []Edema  NEUROLOGIC:   []No focal deficits  [x]Cognitive impairment  []Dysphagia []Dysarthria []Paresis []Other   SKIN:   []Normal   []Pressure ulcer(s)  []Rash     CRITICAL CARE:  [ ] Shock Present  [ ]Septic [ ]Cardiogenic [ ]Neurologic [ ]Hypovolemic  [ ]  Vasopressors [ ]  Inotropes   [ ] Respiratory failure present [ ] Mechanical Ventilation [ ] Non-invasive ventilatory support [ ] High-Flow  [ ] Acute  [ ] Chronic [ ] Hypoxic  [ ] Hypercarbic [ ] Other  [ ] Other organ failure     LABS: None new                        9.2    17.69 )-----------( 396      ( 17 Aug 2020 15:40 )             29.7       144  |  106  |  15  ----------------------------<  113<H>  3.8   |  20<L>  |  0.78    Ca    9.8      17 Aug 2020 15:40    TPro  7.2  /  Alb  2.7<L>  /  TBili  0.4  /  DBili  x   /  AST  32  /  ALT  21  /  AlkPhos  158<H>      Urinalysis Basic - ( 18 Aug 2020 03:16 )    Color: Yellow / Appearance: Clear / S.027 / pH: x  Gluc: x / Ketone: Negative  / Bili: Negative / Urobili: <2 mg/dL   Blood: x / Protein: Trace / Nitrite: Negative   Leuk Esterase: Negative / RBC: x / WBC x   Sq Epi: x / Non Sq Epi: x / Bacteria: x      RADIOLOGY & ADDITIONAL STUDIES: None new    PROTEIN CALORIE MALNUTRITION:   [x] PPSV2 < or = 30% [] significant weight loss [] poor nutritional intake [] anasarca [] catabolic state   Albumin, Serum: 2.7 g/dL (20 @ 15:40)   Artificial Nutrition []     REFERRALS:   []Chaplaincy  []Hospice  []Child Life  [x]Social Work  []Case management []Holistic Therapy []Physical Therapy []Dietary   Goals of Care Document:   Care Coordination Document: GAP TEAM PALLIATIVE CARE UNIT PROGRESS NOTE:      [] Patient on hospice program.    INDICATION FOR PALLIATIVE CARE UNIT SERVICES: Symptom Control, Comfort Measures, GOC    INTERVAL HPI/OVERNIGHT EVENTS: No acute events overnight. Required no PRN pain medications. Overnight bladder scan 500 cc, pt required straight cath x2. This AM pt comfortable, denies any complaints.     DNR on chart: Yes  Yes    Allergies    No Known Allergies    Intolerances    MEDICATIONS  (STANDING):  cefTRIAXone   IVPB 1000 milliGRAM(s) IV Intermittent every 12 hours  donepezil 10 milliGRAM(s) Oral at bedtime  escitalopram 5 milliGRAM(s) Oral daily  heparin   Injectable 5000 Unit(s) SubCutaneous every 8 hours  lactated ringers. 1000 milliLiter(s) (100 mL/Hr) IV Continuous <Continuous>  melatonin 3 milliGRAM(s) Oral at bedtime    MEDICATIONS  (PRN):  acetaminophen   Tablet .. 650 milliGRAM(s) Oral every 6 hours PRN Mild Pain (1 - 3)  morphine  - Injectable 1 milliGRAM(s) IV Push every 4 hours PRN moderate to severe pain    ITEMS UNCHECKED ARE NOT PRESENT    PRESENT SYMPTOMS: [x]Unable to obtain due to poor mentation   Source if other than patient:  [x]Family   [x]Team     Pain: [x] yes [] no  QOL impact - worsening functional status over the last month   Location - right-sided abdominal pain                    Aggravating factors - none   Quality - unable to assess   Radiation - none  Timing - 1 month   Severity (0-10 scale): unable to assess  Minimal acceptable level (0-10 scale): unable to assess      Dyspnea:                           []Mild []Moderate []Severe  Anxiety:                             []Mild []Moderate []Severe  Fatigue:                             []Mild []Moderate []Severe  Nausea:                             []Mild []Moderate []Severe  Loss of appetite:              []Mild []Moderate []Severe  Constipation:                    []Mild []Moderate []Severe    PAINAD Score:  http://geriatrictoolkit.[place].edu/cog/painad.pdf  		  Other Symptoms:  [x]All other review of systems negative     Karnofsky Performance Score/Palliative Performance Status Version 2:         %  http://npcrc.org/files/news/palliative_performance_scale_ppsv2.pdf    PHYSICAL EXAM:  Vital Signs Last 24 Hrs  T(C): 37.1 (18 Aug 2020 04:01), Max: 37.1 (18 Aug 2020 04:01)  T(F): 98.7 (18 Aug 2020 04:01), Max: 98.7 (18 Aug 2020 04:01)  HR: 93 (18 Aug 2020 04:01) (90 - 93)  BP: 129/67 (18 Aug 2020 04:01) (105/66 - 129/67)  BP(mean): --  RR: 20 (18 Aug 2020 04:02) (18 - 24)  SpO2: 91% (18 Aug 2020 04:02) (91% - 97%) I&O's Summary    17 Aug 2020 07:01  -  18 Aug 2020 07:00  --------------------------------------------------------  IN: 0 mL / OUT: 987 mL / NET: -987 mL      GENERAL:  [x]Alert  [x]Oriented x1   []Lethargic  []Cachexia  []Unarousable  [x]Verbal  []Non-Verbal  Behavioral:   [] Anxiety  [] Delirium [] Agitation [] Other  HEENT:  []Normal   [x]Dry mouth   []ET Tube/Trach  []Oral lesions  PULMONARY:   [x]Clear []Tachypnea  []Audible excessive secretions   []Rhonchi        []Right []Left []Bilateral  []Crackles        []Right []Left []Bilateral  []Wheezing     []Right []Left []Bilateral  [] Diminished breath sounds     []Right []Left []Bilateral  CARDIOVASCULAR:    [x]Regular []Irregular []Tachy  []Justyn []Murmur []Other  GASTROINTESTINAL: right-sided abdominal mass   [x]Soft []Distended [x]+BS [x]Non tender []Tender []PEG []OGT/ NGT   Last BM:         GENITOURINARY:  []Normal [] Incontinent   []Oliguria/Anuria   []Roth  MUSCULOSKELETAL:   []Normal  [x]Weakness  []Bed/Wheelchair bound []Edema  NEUROLOGIC:   []No focal deficits  [x]Cognitive impairment  []Dysphagia []Dysarthria []Paresis []Other   SKIN:   []Normal   []Pressure ulcer(s)  []Rash     CRITICAL CARE:  [ ] Shock Present  [ ]Septic [ ]Cardiogenic [ ]Neurologic [ ]Hypovolemic  [ ]  Vasopressors [ ]  Inotropes   [ ] Respiratory failure present [ ] Mechanical Ventilation [ ] Non-invasive ventilatory support [ ] High-Flow  [ ] Acute  [ ] Chronic [ ] Hypoxic  [ ] Hypercarbic [ ] Other  [ ] Other organ failure     LABS: None new                        9.2    17.69 )-----------( 396      ( 17 Aug 2020 15:40 )             29.7       144  |  106  |  15  ----------------------------<  113<H>  3.8   |  20<L>  |  0.78    Ca    9.8      17 Aug 2020 15:40    TPro  7.2  /  Alb  2.7<L>  /  TBili  0.4  /  DBili  x   /  AST  32  /  ALT  21  /  AlkPhos  158<H>      Urinalysis Basic - ( 18 Aug 2020 03:16 )    Color: Yellow / Appearance: Clear / S.027 / pH: x  Gluc: x / Ketone: Negative  / Bili: Negative / Urobili: <2 mg/dL   Blood: x / Protein: Trace / Nitrite: Negative   Leuk Esterase: Negative / RBC: x / WBC x   Sq Epi: x / Non Sq Epi: x / Bacteria: x      RADIOLOGY & ADDITIONAL STUDIES: None new    PROTEIN CALORIE MALNUTRITION:   [x] PPSV2 < or = 30% [] significant weight loss [] poor nutritional intake [] anasarca [] catabolic state   Albumin, Serum: 2.7 g/dL (20 @ 15:40)   Artificial Nutrition []     REFERRALS:   []Chaplaincy  []Hospice  []Child Life  [x]Social Work  []Case management []Holistic Therapy []Physical Therapy []Dietary   Goals of Care Document:   Care Coordination Document: GAP TEAM PALLIATIVE CARE UNIT PROGRESS NOTE:      [] Patient on hospice program.    INDICATION FOR PALLIATIVE CARE UNIT SERVICES: Symptom Control, Comfort Measures, GOC    INTERVAL HPI/OVERNIGHT EVENTS: No acute events overnight. Required no PRN pain medications. Overnight bladder scan 500 cc, pt required straight cath x2. This AM pt comfortable, denies any complaints.     DNR on chart: Yes  Yes    Allergies    No Known Allergies    Intolerances    MEDICATIONS  (STANDING):  cefTRIAXone   IVPB 1000 milliGRAM(s) IV Intermittent every 12 hours  donepezil 10 milliGRAM(s) Oral at bedtime  escitalopram 5 milliGRAM(s) Oral daily  heparin   Injectable 5000 Unit(s) SubCutaneous every 8 hours  lactated ringers. 1000 milliLiter(s) (100 mL/Hr) IV Continuous <Continuous>  melatonin 3 milliGRAM(s) Oral at bedtime    MEDICATIONS  (PRN):  acetaminophen   Tablet .. 650 milliGRAM(s) Oral every 6 hours PRN Mild Pain (1 - 3)  morphine  - Injectable 1 milliGRAM(s) IV Push every 4 hours PRN moderate to severe pain    ITEMS UNCHECKED ARE NOT PRESENT    PRESENT SYMPTOMS: [ ]Unable to obtain due to poor mentation dementia see pain ads score  Source if other than patient:  [x]Family   [x]Team     Pain: [x] yes [] no  QOL impact - worsening functional status over the last month   Location - right-sided abdominal pain                    Aggravating factors - none   Quality - unable to assess   Radiation - none  Timing - 1 month   Severity (0-10 scale): unable to assess  Minimal acceptable level (0-10 scale): unable to assess      Dyspnea:                           []Mild []Moderate []Severe  Anxiety:                             []Mild []Moderate []Severe  Fatigue:                             []Mild []Moderate []Severe  Nausea:                             []Mild []Moderate []Severe  Loss of appetite:              []Mild []Moderate []Severe  Constipation:                    []Mild []Moderate []Severe    PAINAD Score:  http://geriatrictoolkit.[place].edu/cog/painad.pdf  		  Other Symptoms:  [x]All other review of systems negative     Karnofsky Performance Score/Palliative Performance Status Version 2:  40       %  http://npcrc.org/files/news/palliative_performance_scale_ppsv2.pdf    PHYSICAL EXAM:  Vital Signs Last 24 Hrs  T(C): 37.1 (18 Aug 2020 04:01), Max: 37.1 (18 Aug 2020 04:01)  T(F): 98.7 (18 Aug 2020 04:01), Max: 98.7 (18 Aug 2020 04:01)  HR: 93 (18 Aug 2020 04:01) (90 - 93)  BP: 129/67 (18 Aug 2020 04:01) (105/66 - 129/67)  BP(mean): --  RR: 20 (18 Aug 2020 04:02) (18 - 24)  SpO2: 91% (18 Aug 2020 04:02) (91% - 97%) I&O's Summary    17 Aug 2020 07:01  -  18 Aug 2020 07:00  --------------------------------------------------------  IN: 0 mL / OUT: 987 mL / NET: -987 mL      GENERAL:  [x]Alert  [x]Oriented x1   []Lethargic  []Cachexia  []Unarousable  [x]Verbal  []Non-Verbal  Behavioral:   [] Anxiety  [x] Delirium [] Agitation [] Other  HEENT:  []Normal   [x]Dry mouth   []ET Tube/Trach  []Oral lesions  PULMONARY:   [x]Clear []Tachypnea  []Audible excessive secretions   []Rhonchi        []Right []Left []Bilateral  []Crackles        []Right []Left []Bilateral  []Wheezing     []Right []Left []Bilateral  [] Diminished breath sounds     []Right []Left []Bilateral  CARDIOVASCULAR:    [x]Regular []Irregular []Tachy  []Justyn []Murmur []Other  GASTROINTESTINAL: right-sided abdominal mass   [x]Soft [x]Distended [x]+BS [x]Non tender []Tender []PEG []OGT/ NGT   Last BM:   For more information please see RN documentation which I have reviewed.       GENITOURINARY:  []Normal [] Incontinent   []Oliguria/Anuria   [x]Roth  ++ urinary retention  MUSCULOSKELETAL:   []Normal  [x]Weakness  []Bed/Wheelchair bound []Edema  NEUROLOGIC:   []No focal deficits  [x]Cognitive impairment  []Dysphagia []Dysarthria []Paresis []Other   SKIN:   [x]Normal   []Pressure ulcer(s)  []Rash     CRITICAL CARE:  [ ] Shock Present  [ ]Septic [ ]Cardiogenic [ ]Neurologic [ ]Hypovolemic  [ ]  Vasopressors [ ]  Inotropes   [ ] Respiratory failure present [ ] Mechanical Ventilation [ ] Non-invasive ventilatory support [ ] High-Flow  [ ] Acute  [ ] Chronic [ ] Hypoxic  [ ] Hypercarbic [ ] Other  [ ] Other organ failure     LABS: None new                        9.2    17.69 )-----------( 396      ( 17 Aug 2020 15:40 )             29.7       144  |  106  |  15  ----------------------------<  113<H>  3.8   |  20<L>  |  0.78    Ca    9.8      17 Aug 2020 15:40    TPro  7.2  /  Alb  2.7<L>  /  TBili  0.4  /  DBili  x   /  AST  32  /  ALT  21  /  AlkPhos  158<H>      Urinalysis Basic - ( 18 Aug 2020 03:16 )    Color: Yellow / Appearance: Clear / S.027 / pH: x  Gluc: x / Ketone: Negative  / Bili: Negative / Urobili: <2 mg/dL   Blood: x / Protein: Trace / Nitrite: Negative   Leuk Esterase: Negative / RBC: x / WBC x   Sq Epi: x / Non Sq Epi: x / Bacteria: x      RADIOLOGY & ADDITIONAL STUDIES: < from: CT Abdomen and Pelvis w/ IV Cont (20 @ 09:57) >  IMPRESSION:    Marked right hydronephrosis with dilatation of the right renal pelvis, significantly increased since 3/5/2019. There is associated right renal cortical atrophy. The level of the obstruction appears to be at the ureteropelvic junction. An underlying mass cannot be excluded.    Cystic right adnexal mass, unchanged.    Apparent medial rupture of the partially imaged right breast implant.    Findings were discussed with Dr. Botello on  2020 at 11:44 AM by Dr. Gore with read back confirmation.        < end of copied text >    PROTEIN CALORIE MALNUTRITION:   [x] PPSV2 < or = 30% [] significant weight loss [] poor nutritional intake [] anasarca [] catabolic state   Albumin, Serum: 2.7 g/dL (20 @ 15:40)   Artificial Nutrition []     REFERRALS:   []Chaplaincy  []Hospice  []Child Life  [x]Social Work  []Case management []Holistic Therapy []Physical Therapy []Dietary   Goals of Care Document:   Care Coordination Document:

## 2020-08-18 NOTE — CONSULT NOTE ADULT - SUBJECTIVE AND OBJECTIVE BOX
----------------------------------------------------------  Interventional Radiology Brief Consult Note  -----------------------------------------------------------    Reason for Referral:  Right sided nephrostomy tube placement.    Clinical Summary:   87-year-old female with HTN, dementia, recent hospitalization at [facility] found to have L pubic ramus fracture, R sacral fracture, and R UPJ dilation, sent by. Dr. Hood for abdominal pain and recent decline in functional status. As per outpatient provider Dr. Hood, pt had a vertebral compression fracture 1 year ago with no loss of function at that time. However one month ago, patient developed abdominal pain and decline in functional status.  Pt was seen at [facility] and diagnosed with sciatica and sent home. On review of imaging, pt found to have L pubic ramus fracture, R sacral fracture, and R UPJ dilation size of grapefruit due to obstruction with R kidney chronically shriveled. Dr. Hood spoke to Urology who advised against surgery given pt's chronically shriveled R kidney; family also opting for more conservative management but would like to address any reversible causes of pain.     Pt's baseline status is ambulatory with a cane, has a HHA that assists with ADLs, lives with son, dementia with significant short term memory loss.     Vitals:  T(C): 37.1 (08-18-20 @ 04:01), Max: 37.1 (08-18-20 @ 04:01)  HR: 93 (08-18-20 @ 04:01) (92 - 93)  BP: 129/67 (08-18-20 @ 04:01) (105/66 - 129/67)  RR: 20 (08-18-20 @ 04:02) (18 - 20)  SpO2: 91% (08-18-20 @ 04:02) (91% - 97%)    Labs:           8.6  18.46)-----(360     (08-18-20 @ 10:35)         27.8     139 | 103 | 12  --------------------< 114     (08-18-20 @ 10:35)  3.5 | 22 | 0.70       PT: 15.4<H> 08-18-20 @ 13:40  aPTT: 29.5 08-18-20 @ 13:40   INR: 1.31<H> 08-18-20 @ 13:40      Assessment: 87y Female with large right sided hydronephrosis likely the cause of the patient right sided pain.    Recommendations:  - Right sided nephrostomy tube placement 8/19/2020  - NPO at midnight  - Labs and coags in am   - D/w Dr. Olmstead

## 2020-08-18 NOTE — CONSULT NOTE ADULT - SUBJECTIVE AND OBJECTIVE BOX
This is a 87-year-old female with HTN, dementia, recent hospitalization at [facility] found to have L pubic ramus fracture, R sacral fracture, and R UPJ dilation, sent by. Dr. Hood for abdominal pain and recent decline in functional status. Per patient son, she developed abdominal pain and he saw a   decline in functional status.  Pt was seen at [facility] and diagnosed with sciatica and sent home. On review of imaging, pt found to have L pubic ramus fracture, R sacral fracture, and severe R UPJ dilation  due to obstruction with R kidney chronically atrophic. Her PMD spoke to a urologist  who advised against surgery given pt's atrophic R kidney; family also opting for more conservative management but would like to address any reversible causes of pain. During that time, labs and additional imaging reported to be WNL.     As per son, pt has been having decreased PO intake for the last month along with loss of function. Pt found to have UTI outpatient, and had been on Cipro.  He also reports that pt was getting PPX antibiotics 3 times a week prior. No fevers, N/V/D, no hematuria.  Patient had been straight catheterized multiple times since admission for retention, but now has indwelling martinez.     Patient not reliable historian due to dementia, but is holding her abdomen and moaning in pain.      PHM  HTN (hypertension), dementia   PSHx:  History of hip surgery    MEDICATIONS  (STANDING):  donepezil 10 milliGRAM(s) Oral at bedtime  escitalopram 5 milliGRAM(s) Oral daily  heparin   Injectable 5000 Unit(s) SubCutaneous every 8 hours  lactated ringers. 1000 milliLiter(s) (100 mL/Hr) IV Continuous <Continuous>  melatonin 3 milliGRAM(s) Oral at bedtime  piperacillin/tazobactam IVPB.. 3.375 Gram(s) IV Intermittent every 8 hours  potassium phosphate / sodium phosphate Tablet (K-PHOS No. 2) 1 Tablet(s) Oral two times a day before meals  senna 2 Tablet(s) Oral at bedtime    MEDICATIONS  (PRN):  acetaminophen   Tablet .. 650 milliGRAM(s) Oral every 6 hours PRN Mild Pain (1 - 3)  bisacodyl Suppository 10 milliGRAM(s) Rectal daily PRN Constipation  morphine  - Injectable 1 milliGRAM(s) IV Push every 4 hours PRN moderate to severe pain    FAMILY HISTORY:  FH: dementia    Allergies    No Known Allergies    Intolerances      SOCIAL HISTORY:   Tobacco hx: former smoker     REVIEW OF SYSTEMS: Pertinent positives and negatives as stated in HPI, otherwise negative    Vital signs  T(C): 37.1, Max: 37.1 ( @ 04:01)  HR: 93  BP: 129/67  SpO2: 91%    I&O's Detail    17 Aug 2020 07:01  -  18 Aug 2020 07:00  --------------------------------------------------------  IN:  Total IN: 0 mL    OUT:    Intermittent Catheterization - Urethral: 987 mL  Total OUT: 987 mL    Total NET: -987 mL          Physical Exam  Gen: NAD  Pulm: No respiratory distress, no subcostal retractions  CV: RRR, no JVD  Abd: Soft, distended, + generalized tenderness, R>L   : Martinez- clear yellow urine   MSK: No edema present    LABS:           @ 10:35    WBC 18.46 / Hct 27.8  / SCr 0.70      @ 15:40    WBC 17.69 / Hct 29.7  / SCr 0.78         139  |  103  |  12  ----------------------------<  114<H>  3.5   |  22  |  0.70    Ca    8.9      18 Aug 2020 10:35  Phos  2.3       Mg     1.8         TPro  7.2  /  Alb  2.7<L>  /  TBili  0.4  /  DBili  x   /  AST  32  /  ALT  21  /  AlkPhos  158<H>        Urinalysis Basic - ( 18 Aug 2020 03:16 )    Color: Yellow / Appearance: Clear / S.027 / pH: x  Gluc: x / Ketone: Negative  / Bili: Negative / Urobili: <2 mg/dL   Blood: x / Protein: Trace / Nitrite: Negative   Leuk Esterase: Negative / RBC: x / WBC x   Sq Epi: x / Non Sq Epi: x / Bacteria: x        Urine Cx:   Blood Cx:    RADIOLOGY:  < from: CT Abdomen and Pelvis w/ IV Cont (20 @ 09:57) >    IMPRESSION:    Marked right hydronephrosis with dilatation of the right renal pelvis, significantly increased since 3/5/2019. There is associated right renal cortical atrophy. The level of the obstruction appears to be at the ureteropelvic junction. An underlying mass cannot be excluded.    Cystic right adnexal mass, unchanged.    Apparent medial rupture of the partially imaged right breast implant.    < end of copied text >

## 2020-08-19 LAB
ANION GAP SERPL CALC-SCNC: 14 MMOL/L — SIGNIFICANT CHANGE UP (ref 5–17)
APTT BLD: 28.3 SEC — SIGNIFICANT CHANGE UP (ref 27.5–35.5)
BASOPHILS # BLD AUTO: 0.03 K/UL — SIGNIFICANT CHANGE UP (ref 0–0.2)
BASOPHILS NFR BLD AUTO: 0.2 % — SIGNIFICANT CHANGE UP (ref 0–2)
BUN SERPL-MCNC: 10 MG/DL — SIGNIFICANT CHANGE UP (ref 7–23)
CALCIUM SERPL-MCNC: 8.8 MG/DL — SIGNIFICANT CHANGE UP (ref 8.4–10.5)
CHLORIDE SERPL-SCNC: 103 MMOL/L — SIGNIFICANT CHANGE UP (ref 96–108)
CO2 SERPL-SCNC: 23 MMOL/L — SIGNIFICANT CHANGE UP (ref 22–31)
CREAT SERPL-MCNC: 0.77 MG/DL — SIGNIFICANT CHANGE UP (ref 0.5–1.3)
EOSINOPHIL # BLD AUTO: 0.14 K/UL — SIGNIFICANT CHANGE UP (ref 0–0.5)
EOSINOPHIL NFR BLD AUTO: 0.8 % — SIGNIFICANT CHANGE UP (ref 0–6)
FERRITIN SERPL-MCNC: 872 NG/ML — HIGH (ref 15–150)
FOLATE SERPL-MCNC: 15.9 NG/ML — SIGNIFICANT CHANGE UP
GLUCOSE SERPL-MCNC: 115 MG/DL — HIGH (ref 70–99)
HCT VFR BLD CALC: 24.2 % — LOW (ref 34.5–45)
HGB BLD-MCNC: 7.5 G/DL — LOW (ref 11.5–15.5)
IMM GRANULOCYTES NFR BLD AUTO: 3.2 % — HIGH (ref 0–1.5)
INR BLD: 1.28 RATIO — HIGH (ref 0.88–1.16)
IRON SATN MFR SERPL: 16 % — SIGNIFICANT CHANGE UP (ref 14–50)
IRON SATN MFR SERPL: 23 UG/DL — LOW (ref 30–160)
LYMPHOCYTES # BLD AUTO: 16 % — SIGNIFICANT CHANGE UP (ref 13–44)
LYMPHOCYTES # BLD AUTO: 2.93 K/UL — SIGNIFICANT CHANGE UP (ref 1–3.3)
MAGNESIUM SERPL-MCNC: 1.8 MG/DL — SIGNIFICANT CHANGE UP (ref 1.6–2.6)
MCHC RBC-ENTMCNC: 28.4 PG — SIGNIFICANT CHANGE UP (ref 27–34)
MCHC RBC-ENTMCNC: 31 GM/DL — LOW (ref 32–36)
MCV RBC AUTO: 91.7 FL — SIGNIFICANT CHANGE UP (ref 80–100)
MONOCYTES # BLD AUTO: 1.15 K/UL — HIGH (ref 0–0.9)
MONOCYTES NFR BLD AUTO: 6.3 % — SIGNIFICANT CHANGE UP (ref 2–14)
NEUTROPHILS # BLD AUTO: 13.51 K/UL — HIGH (ref 1.8–7.4)
NEUTROPHILS NFR BLD AUTO: 73.5 % — SIGNIFICANT CHANGE UP (ref 43–77)
NRBC # BLD: 0 /100 WBCS — SIGNIFICANT CHANGE UP (ref 0–0)
PHOSPHATE SERPL-MCNC: 2.9 MG/DL — SIGNIFICANT CHANGE UP (ref 2.5–4.5)
PLATELET # BLD AUTO: 339 K/UL — SIGNIFICANT CHANGE UP (ref 150–400)
POTASSIUM SERPL-MCNC: 3.3 MMOL/L — LOW (ref 3.5–5.3)
POTASSIUM SERPL-SCNC: 3.3 MMOL/L — LOW (ref 3.5–5.3)
PROTHROM AB SERPL-ACNC: 14.9 SEC — HIGH (ref 10.6–13.6)
RBC # BLD: 2.64 M/UL — LOW (ref 3.8–5.2)
RBC # FLD: 15.7 % — HIGH (ref 10.3–14.5)
SODIUM SERPL-SCNC: 140 MMOL/L — SIGNIFICANT CHANGE UP (ref 135–145)
TIBC SERPL-MCNC: 143 UG/DL — LOW (ref 220–430)
TSH SERPL-MCNC: 0.53 UIU/ML — SIGNIFICANT CHANGE UP (ref 0.27–4.2)
UIBC SERPL-MCNC: 120 UG/DL — SIGNIFICANT CHANGE UP (ref 110–370)
VIT B12 SERPL-MCNC: >2000 PG/ML — HIGH (ref 232–1245)
WBC # BLD: 18.34 K/UL — HIGH (ref 3.8–10.5)
WBC # FLD AUTO: 18.34 K/UL — HIGH (ref 3.8–10.5)

## 2020-08-19 PROCEDURE — 99232 SBSQ HOSP IP/OBS MODERATE 35: CPT

## 2020-08-19 PROCEDURE — 99223 1ST HOSP IP/OBS HIGH 75: CPT

## 2020-08-19 PROCEDURE — 50432 PLMT NEPHROSTOMY CATHETER: CPT | Mod: RT

## 2020-08-19 RX ORDER — HEPARIN SODIUM 5000 [USP'U]/ML
5000 INJECTION INTRAVENOUS; SUBCUTANEOUS EVERY 8 HOURS
Refills: 0 | Status: DISCONTINUED | OUTPATIENT
Start: 2020-08-20 | End: 2020-08-24

## 2020-08-19 RX ORDER — PIPERACILLIN AND TAZOBACTAM 4; .5 G/20ML; G/20ML
3.38 INJECTION, POWDER, LYOPHILIZED, FOR SOLUTION INTRAVENOUS EVERY 8 HOURS
Refills: 0 | Status: DISCONTINUED | OUTPATIENT
Start: 2020-08-19 | End: 2020-08-21

## 2020-08-19 RX ORDER — POTASSIUM CHLORIDE 20 MEQ
40 PACKET (EA) ORAL ONCE
Refills: 0 | Status: COMPLETED | OUTPATIENT
Start: 2020-08-19 | End: 2020-08-19

## 2020-08-19 RX ORDER — SODIUM CHLORIDE 9 MG/ML
1000 INJECTION, SOLUTION INTRAVENOUS
Refills: 0 | Status: DISCONTINUED | OUTPATIENT
Start: 2020-08-19 | End: 2020-08-20

## 2020-08-19 RX ADMIN — MORPHINE SULFATE 1 MILLIGRAM(S): 50 CAPSULE, EXTENDED RELEASE ORAL at 15:08

## 2020-08-19 RX ADMIN — SENNA PLUS 2 TABLET(S): 8.6 TABLET ORAL at 22:58

## 2020-08-19 RX ADMIN — Medication 3 MILLIGRAM(S): at 22:58

## 2020-08-19 RX ADMIN — Medication 40 MILLIEQUIVALENT(S): at 10:15

## 2020-08-19 RX ADMIN — SODIUM CHLORIDE 100 MILLILITER(S): 9 INJECTION, SOLUTION INTRAVENOUS at 18:20

## 2020-08-19 RX ADMIN — MORPHINE SULFATE 1 MILLIGRAM(S): 50 CAPSULE, EXTENDED RELEASE ORAL at 15:23

## 2020-08-19 RX ADMIN — MORPHINE SULFATE 1 MILLIGRAM(S): 50 CAPSULE, EXTENDED RELEASE ORAL at 10:34

## 2020-08-19 RX ADMIN — MORPHINE SULFATE 1 MILLIGRAM(S): 50 CAPSULE, EXTENDED RELEASE ORAL at 10:19

## 2020-08-19 RX ADMIN — PIPERACILLIN AND TAZOBACTAM 25 GRAM(S): 4; .5 INJECTION, POWDER, LYOPHILIZED, FOR SOLUTION INTRAVENOUS at 14:17

## 2020-08-19 RX ADMIN — PIPERACILLIN AND TAZOBACTAM 25 GRAM(S): 4; .5 INJECTION, POWDER, LYOPHILIZED, FOR SOLUTION INTRAVENOUS at 22:58

## 2020-08-19 RX ADMIN — ESCITALOPRAM OXALATE 5 MILLIGRAM(S): 10 TABLET, FILM COATED ORAL at 16:27

## 2020-08-19 RX ADMIN — SODIUM CHLORIDE 100 MILLILITER(S): 9 INJECTION, SOLUTION INTRAVENOUS at 19:31

## 2020-08-19 RX ADMIN — DONEPEZIL HYDROCHLORIDE 10 MILLIGRAM(S): 10 TABLET, FILM COATED ORAL at 22:58

## 2020-08-19 RX ADMIN — PIPERACILLIN AND TAZOBACTAM 25 GRAM(S): 4; .5 INJECTION, POWDER, LYOPHILIZED, FOR SOLUTION INTRAVENOUS at 05:02

## 2020-08-19 RX ADMIN — HEPARIN SODIUM 5000 UNIT(S): 5000 INJECTION INTRAVENOUS; SUBCUTANEOUS at 23:07

## 2020-08-19 NOTE — PROGRESS NOTE ADULT - PROBLEM SELECTOR PLAN 7
- Currently AOx1   - C/w donepezil Chronic, stable.  Hold amlodipine  Monitor BP, restart as indicated

## 2020-08-19 NOTE — PROGRESS NOTE ADULT - PROBLEM SELECTOR PLAN 1
- Meets SIRS criteria HR 93, leukocytosis to 17.7, mildly elevated procalcitonin 0.29. Right-sided abdominal pain in the setting of R UPJ dilation. Prior diverticulitis. Unclear source of infection currently, likely abdominal    - Recent tx for UTI (6 days of cipro PO as of 8/17)  - U/A negative, CXR clear lungs, COVID negative    - F/u CT abdomen/pelvis, blood cx, urine cx   - C/w ceftriaxone (8/17 - )  - Maintenance fluids with  cc/hr - Continues to meet SIRS criteria HR 98, leukocytosis to 18.3, mildly elevated procalcitonin 0.29. Right-sided abdominal pain in the setting of R UPJ dilation. Prior diverticulitis. Unclear source of infection currently, likely abdominal    - Recent tx for UTI (6 days of cipro PO as of 8/17)  - U/A negative, CXR clear lungs, COVID negative    - CT Abd does not show any clear infectious etiology, but does show increased dilation of the r ureter compared to past.   - C/w ceftriaxone (8/17 - )  - Maintenance fluids with  cc/hr - Continues to meet SIRS criteria HR 98, leukocytosis to 18.3, mildly elevated procalcitonin 0.29. Right-sided abdominal pain in the setting of R UPJ dilation. Prior diverticulitis. Unclear source of infection currently, likely abdominal    - Recent tx for UTI (6 days of cipro PO as of 8/17)  - U/A negative, CXR clear lungs, COVID negative    - CT Abd does not show any clear infectious etiology, but does show increased dilation of the r ureter compared to past.   - On Zosyn 3.375 mg q8. Will consider stopping abx after IR procedure. Patient does not appear toxic, with negative urine and blood cultures, leukocytosis may be reactive.   -s/p 1600 mL LR IVF - Continues to meet SIRS criteria HR 98, leukocytosis to 18.3, mildly elevated procalcitonin 0.29. Right-sided abdominal pain in the setting of R UPJ dilation. Prior diverticulitis. Unclear source of infection currently, likely abdominal    - Recent tx for UTI (6 days of cipro PO as of 8/17)  - U/A negative, CXR clear lungs, COVID negative    - CT Abd does not show any clear infectious etiology, but does show increased dilation of the r ureter compared to past.   - On Zosyn 3.375 mg q8. Will consider stopping abx after IR procedure. Patient does not appear toxic, with negative urine and blood cultures, leukocytosis may be reactive.   -Continue IVF  - ID consulted - Continues to meet SIRS criteria HR 98, leukocytosis to 18.3, mildly elevated procalcitonin 0.29. Right-sided abdominal pain in the setting of R UPJ dilation. Prior diverticulitis. Unclear source of infection currently, likely abdominal    - s/p R nephrostomy tube with 1400 cc purulent drainage.   - Follow up urine culture from IR procedure  - U/A negative, CXR clear lungs, COVID negative    - On Zosyn 3.375 mg q8.  -Continue IVF  - ID consulted  - Discussion with daughter, Diamond at bedside regarding potential need for MICU if patient were to require pressors for hypotension. She will discuss with siblings but it seems like family leaning toward staying in PCU and does not want to pursue aggressive treatment

## 2020-08-19 NOTE — PRE-ANESTHESIA EVALUATION ADULT - NSANTHPEFT_GEN_ALL_CORE
Gen: Awake, pleasantly demented  CV: Regular rate  Pulm: Mild distress, tachypneic   Neuro: AAOx1 (self)

## 2020-08-19 NOTE — PROGRESS NOTE ADULT - PROBLEM SELECTOR PLAN 5
- Pt w/ significant anterior right leg tenderness on exam  - [facility] duplex LEs ordered r/o DVT, f/u - P reports improvement of anterior right leg tenderness on exam  - VA duplex LEs cancelled. - Pt w/ recent decline in functional status over the last month, less ambulatory. Prior to that ambulated with cane  - Consider PT eval   - Supportive care

## 2020-08-19 NOTE — PROGRESS NOTE ADULT - ASSESSMENT
This is a 87-year-old female with HTN, dementia, recent hospitalization at [facility] found to have L pubic ramus fracture, R sacral fracture, and R UPJ dilation, recent UTI, presenting with abdominal pain and decline in functional status over the last month. Found to have leukocytosis 17.7. Pt admitted for management of pain and sepsis of unclear etiology. This is a 87-year-old female with HTN, dementia, recent hospitalization at [facility] found to have L pubic ramus fracture, R sacral fracture, and R UPJ dilation, recent UTI, presenting with abdominal pain and decline in functional status over the last month. Found to have leukocytosis 17.7. Pt admitted for management of pain and sepsis of unclear etiology. Pain likely due to urinary obstruction 2/2 ureteral dilation. This is a 87-year-old female with HTN, dementia, recent hospitalization at [facility] found to have L pubic ramus fracture, R sacral fracture, and R UPJ dilation, recent UTI, presenting with abdominal pain and decline in functional status over the last month. Found to have leukocytosis 17.7. Pt admitted for management of pain and sepsis of unclear etiology. Pain likely due to urinary obstruction with significant ureteral dilation.

## 2020-08-19 NOTE — PROGRESS NOTE ADULT - SUBJECTIVE AND OBJECTIVE BOX
GAP TEAM PALLIATIVE CARE UNIT PROGRESS NOTE:      [] Patient on hospice program.    INDICATION FOR PALLIATIVE CARE UNIT SERVICES: Workup of abdominal pain    INTERVAL HPI/OVERNIGHT EVENTS:     DNR on chart: Yes  Yes    Allergies    No Known Allergies    Intolerances    MEDICATIONS  (STANDING):  donepezil 10 milliGRAM(s) Oral at bedtime  escitalopram 5 milliGRAM(s) Oral daily  lactated ringers. 1000 milliLiter(s) (100 mL/Hr) IV Continuous <Continuous>  melatonin 3 milliGRAM(s) Oral at bedtime  piperacillin/tazobactam IVPB.. 3.375 Gram(s) IV Intermittent every 8 hours  potassium chloride   Powder 40 milliEquivalent(s) Oral once  senna 2 Tablet(s) Oral at bedtime    MEDICATIONS  (PRN):  acetaminophen   Tablet .. 650 milliGRAM(s) Oral every 6 hours PRN Mild Pain (1 - 3)  bisacodyl Suppository 10 milliGRAM(s) Rectal daily PRN Constipation  morphine  - Injectable 1 milliGRAM(s) IV Push every 4 hours PRN moderate to severe pain    ITEMS UNCHECKED ARE NOT PRESENT    PRESENT SYMPTOMS: [x]Unable to obtain due to poor mentation   Source if other than patient:  [x]Family   [x]Team     Pain: [] yes [] no  QOL impact -   Location -                    Aggravating factors -  Quality -  Radiation -  Timing -  Severity (0-10 scale):  Minimal acceptable level (0-10 scale):     Dyspnea:                           []Mild []Moderate []Severe  Anxiety:                             []Mild []Moderate []Severe  Fatigue:                             []Mild []Moderate []Severe  Nausea:                             []Mild []Moderate []Severe  Loss of appetite:              []Mild []Moderate []Severe  Constipation:                    []Mild []Moderate []Severe    PAINAD Score:  http://geriatrictoolkit.[place].[facility]/cog/painad.pdf  		  Other Symptoms:  [x]All other review of systems negative     Karnofsky Performance Score/Palliative Performance Status Version 2:         %  http://npcrc.org/files/news/palliative_performance_scale_ppsv2.pdf    PHYSICAL EXAM:  Vital Signs Last 24 Hrs  T(C): 37.1 (19 Aug 2020 08:04), Max: 37.1 (19 Aug 2020 08:04)  T(F): 98.8 (19 Aug 2020 08:04), Max: 98.8 (19 Aug 2020 08:04)  HR: 98 (19 Aug 2020 08:04) (90 - 100)  BP: 130/68 (19 Aug 2020 08:04) (116/64 - 130/68)  RR: 18 (19 Aug 2020 08:04) (18 - 18)  SpO2: 90% (19 Aug 2020 08:04) (90% - 94%)     I&O's Summary    18 Aug 2020 07:01  -  19 Aug 2020 07:00  --------------------------------------------------------  IN: 0 mL / OUT: 500 mL / NET: -500 mL      GENERAL:  []Alert  []Oriented x   []Lethargic  []Cachexia  []Unarousable  []Verbal  []Non-Verbal  Behavioral:   [] Anxiety  [] Delirium [] Agitation [] Other  HEENT:  []Normal   []Dry mouth   []ET Tube/Trach  []Oral lesions  PULMONARY:   [x]Clear []Tachypnea  []Audible excessive secretions   []Rhonchi        []Right []Left []Bilateral  []Crackles        []Right []Left []Bilateral  []Wheezing     []Right []Left []Bilateral  CARDIOVASCULAR:    [x]Regular []Irregular []Tachy  []Jusytn []Murmur []Other  GASTROINTESTINAL:  [x]Soft []Distended [x]+BS [x]Non tender []Tender []PEG []OGT/ NGT   Last BM:       GENITOURINARY:  []Normal [x] Incontinent   []Oliguria/Anuria   []Roth  MUSCULOSKELETAL:   []Normal  []Weakness  [x]Bed/Wheelchair bound []Edema  NEUROLOGIC:   []No focal deficits  [x]Cognitive impairment  []Dysphagia []Dysarthria []Paresis []Other   SKIN:   []Normal   []Pressure ulcer(s)  [x]Rash: Please refer to nursing notes for further documentation    CRITICAL CARE:  [ ] Shock Present  [ ]Septic [ ]Cardiogenic [ ]Neurologic [ ]Hypovolemic  [ ]  Vasopressors [ ]  Inotropes   [ ] Respiratory failure present [ ] Mechanical Ventilation [ ] Non-invasive ventilatory support [ ] High-Flow  [ ] Acute  [ ] Chronic [ ] Hypoxic  [ ] Hypercarbic [ ] Other  [ ] Other organ failure     LABS: None new                        7.5    18.34 )-----------( 339      ( 19 Aug 2020 07:04 )             24.2   -    140  |  103  |  10  ----------------------------<  115<H>  3.3<L>   |  23  |  0.77    Ca    8.8      19 Aug 2020 07:04  Phos  2.9       Mg     1.8         TPro  7.2  /  Alb  2.7<L>  /  TBili  0.4  /  DBili  x   /  AST  32  /  ALT  21  /  AlkPhos  158<H>    PT/INR - ( 18 Aug 2020 13:40 )   PT: 15.4 sec;   INR: 1.31 ratio         PTT - ( 18 Aug 2020 13:40 )  PTT:29.5 sec  Urinalysis Basic - ( 18 Aug 2020 03:16 )    Color: Yellow / Appearance: Clear / S.027 / pH: x  Gluc: x / Ketone: Negative  / Bili: Negative / Urobili: <2 mg/dL   Blood: x / Protein: Trace / Nitrite: Negative   Leuk Esterase: Negative / RBC: x / WBC x   Sq Epi: x / Non Sq Epi: x / Bacteria: x      RADIOLOGY & ADDITIONAL STUDIES: None new    PROTEIN CALORIE MALNUTRITION:   [x] PPSV2 < or = 30% [] significant weight loss [] poor nutritional intake [] anasarca [] catabolic state   Albumin, Serum: 2.7 g/dL (20 @ 15:40)   Artificial Nutrition []     REFERRALS:   []Chaplaincy  []Hospice  []Child Life  [x]Social Work  []Case management []Holistic Therapy []Physical Therapy []Dietary   Goals of Care Document:   Care Coordination Document: Care Coordination Assessment 201 [C. Provider] (20 @ 12:10)                              Care Coordination Assessment 201    COGNITIVE/LEARNING 201  Mental Status    Answers: Confused,  Answers: Unable to assess    Notes: Mental Status    Notes: Patient has AMS s/p UTI, hx of Dementia.    ADMISSION HISTORY 201  Admitted From    Answers: Acute Hospital    Functional Status Prior to Admission    Answers: Assistive equipment,  Answers: Assistive person    Notes: Functional Status Prior to Admission    Notes: cane, live-in aide    Services Present on Admission    Answers: DME; specify cane    Notes: Services on Admission, Contacts    Notes: Live-in aide    FINANCIAL 201  Does patient have financial concerns for discharge?    Answers: None    LIVING ARRANGEMENTS/SUPPORT 201  Housing Environment    Answers: House,  Answers: Stairs, number of steps 6steps outside/8steps inside    Living Arrangements    Answers: Lives with family    Sources of support/caregivers    Answers: Children,  Answers: Hired Care    Notes: Sources of support    Notes: Son-Yoandy (Emergency contact/HCP 2nd agent) Dtr Sandra Beauchamp (HCP)    CAREGIVER CONTACT 201  Does the patient wish to identify a Caregiver?    Answers: Unable to assess    EMERGENCY CONTACTS OUTSIDE HOME 201  Emergency Contact    Answers: Emergency Contact Name Yoandy Kolb,  Answers: Emergency Contact Phone # [phone],  Answers: Emergency Contact Relationship son    Notes: Emergency Contact:    Notes: Sandra Beauchamp-[phone]    DISCHARGE PLANNING 201  Potential Discharge Plan and Services    Answers: Anticipated Needs Unclear at Present    SCREENING 201  Social Work Screen and Referral    Answers: Adjustment to Illness/Difficulty Coping,  Answers: Major Illness Causing Lifestyles Changes    SUMMARY 201  Initial Clinical Summary    Notes: Social work reviewed medical chart of patient on PCU for psychosocial  assessment and support. As per H&P, patient is a  87-year-old , English  speaking female with HTN, dementia, recent hospitalization at [facility] found to  have L pubic ramus fracture, R sacral fracture, and R UPJ dilation, sent by.  Dr. Hood for abdominal pain and recent decline in functional status. As per  outpatient provider Dr. Hood, pt had a vertebral compression fracture 1 year  ago with no loss of function at that time. However one month ago, patient  developed abdominal pain and decline in functional status.  Pt was seen at  [facility] and diagnosed with sciatica and sent home. On review of imaging, pt  found to have L pubic ramus fracture, R sacral fracture, and R UPJ dilation  size of grapefruit due to obstruction with R kidney chronically shriveled. Dr. Hood spoke to Urology who advised against surgery given pt's chronically  shriveled R kidney; family also opting for more conservative management but  would like to address any reversible causes of pain. During that time, labs and  additional imaging reported to be WNL.     As per son, pt has been having decreased PO intake for the last month along  with loss of function. Pt found to have UTI outpatient, currently on cipro day  6/; son also reports that pt was getting PPX antibiotics 3 times a week prior.  Pt has had abdominal pain for which they were treating with Tylenol with workup  noted above. Also reports that pt has appeared more dyspneic lately. No melena,  hematochezia, cough, diarrhea, constipation, fever.     Patient unable to participate in assessment s/p dementia, AMS s/p UTI. Patient  unable to identify caregiver. LMSW spoke with patient's son Yoandy to introduce  self and explain role. Patient's son confirmed demographics. Patient's son  Yoandy [phone] reports being emergency contact and secondary HCP. Yoandy  identified his sister Sandra Beauchamp [phone] as primary health care proxy.  LMSW request copy of HCP for medical chart. Patient is a  with 4 children  (Son-Yoandy lives in [place] and 3 others lives in [place]).     Patient resides with son Yoandy and his children in a private house with 6steps  to enter and 8steps inside. Patient's son reports that prior to 6weeks ago  patient was more independent, active, able to climb steps inside and willing to  participate in ADLs, however recently becoming more dependent and refusing to  shower as well as disoriented and confused. Patient went to [facility]  informed having UTI and Kidney mass, transferred to [facility] for further medical  management for decline functional status. Patient requires assistance with  ADLs. Patient has a private hire live-in aide for the last 14years, aide at  bedside and will stay over to provide ongoing social support. Patient has cane  to assist with balance, gait. Patient's PCP is Dr. Lynda Hood [phone] or  [phone]. Patient receives her medications from [facility] .  Patient's son drives patient to medical appts.    Anticipated Discharge Plan and Services    Notes: Patient's discharge needs are uncleared at present and will be assessed  when appropriate. Pastoral care remains available. LMSW assured availability  and provide contact number. LMSW remains available for ongoing needs.       Electronically signed by:  Fay Lock  Electronically signed on:  2020  12:09 GAP TEAM PALLIATIVE CARE UNIT PROGRESS NOTE:      [] Patient on hospice program.    INDICATION FOR PALLIATIVE CARE UNIT SERVICES: Workup of abdominal pain    INTERVAL HPI/OVERNIGHT EVENTS: No acute events overnight. Pt feels better and was able to have a normal BM. Received 3 mg Morphine over the past 24 hours for pain.     DNR on chart: Yes  Yes    Allergies    No Known Allergies    Intolerances    MEDICATIONS  (STANDING):  donepezil 10 milliGRAM(s) Oral at bedtime  escitalopram 5 milliGRAM(s) Oral daily  lactated ringers. 1000 milliLiter(s) (100 mL/Hr) IV Continuous <Continuous>  melatonin 3 milliGRAM(s) Oral at bedtime  piperacillin/tazobactam IVPB.. 3.375 Gram(s) IV Intermittent every 8 hours  potassium chloride   Powder 40 milliEquivalent(s) Oral once  senna 2 Tablet(s) Oral at bedtime    MEDICATIONS  (PRN):  acetaminophen   Tablet .. 650 milliGRAM(s) Oral every 6 hours PRN Mild Pain (1 - 3)  bisacodyl Suppository 10 milliGRAM(s) Rectal daily PRN Constipation  morphine  - Injectable 1 milliGRAM(s) IV Push every 4 hours PRN moderate to severe pain    ITEMS UNCHECKED ARE NOT PRESENT    PRESENT SYMPTOMS: [x]Unable to obtain due to poor mentation   Source if other than patient:  [x]Family   [x]Team     Pain: [X] yes [] no  QOL impact - worsening functional status over the last month  Location - right side abdomen                    Aggravating factors - none  Quality - unable to assess  Radiation - none  Timing - 1 month  Severity (0-10 scale): unable to assess  Minimal acceptable level (0-10 scale): unable to assess     Dyspnea:                           []Mild []Moderate []Severe  Anxiety:                             []Mild []Moderate []Severe  Fatigue:                             []Mild []Moderate []Severe  Nausea:                             []Mild []Moderate []Severe  Loss of appetite:              []Mild []Moderate []Severe  Constipation:                    []Mild []Moderate []Severe    PAINAD Score:  http://geriatrictoolkit.[place].[facility]/cog/painad.pdf  		  Other Symptoms:  [x]All other review of systems negative     Karnofsky Performance Score/Palliative Performance Status Version 2:         %  http://npcrc.org/files/news/palliative_performance_scale_ppsv2.pdf    PHYSICAL EXAM:  Vital Signs Last 24 Hrs  T(C): 37.1 (19 Aug 2020 08:04), Max: 37.1 (19 Aug 2020 08:04)  T(F): 98.8 (19 Aug 2020 08:04), Max: 98.8 (19 Aug 2020 08:04)  HR: 98 (19 Aug 2020 08:04) (90 - 100)  BP: 130/68 (19 Aug 2020 08:04) (116/64 - 130/68)  RR: 18 (19 Aug 2020 08:04) (18 - 18)  SpO2: 90% (19 Aug 2020 08:04) (90% - 94%)     I&O's Summary    18 Aug 2020 07:01  -  19 Aug 2020 07:00  --------------------------------------------------------  IN: 0 mL / OUT: 500 mL / NET: -500 mL      GENERAL:  [X]Alert  [X]Oriented x 1   []Lethargic  []Cachexia  []Unarousable  [X]Verbal  []Non-Verbal  Behavioral:   [] Anxiety  [] Delirium [] Agitation [] Other  HEENT:  []Normal   [X] Dry mouth   []ET Tube/Trach  []Oral lesions  PULMONARY:   [x]Clear []Tachypnea  []Audible excessive secretions   []Rhonchi        []Right []Left []Bilateral  []Crackles        []Right []Left []Bilateral  []Wheezing     []Right []Left []Bilateral  CARDIOVASCULAR:    [x]Regular []Irregular []Tachy  []Justyn []Murmur []Other  GASTROINTESTINAL:  [x]Soft []Distended [x]+BS [x]Non tender []Tender []PEG []OGT/ NGT   Last BM:       GENITOURINARY:  []Normal [x] Incontinent   []Oliguria/Anuria   []Roth  MUSCULOSKELETAL:   []Normal  [X]Weakness  [x]Bed/Wheelchair bound []Edema  NEUROLOGIC:   []No focal deficits  [x]Cognitive impairment  []Dysphagia []Dysarthria []Paresis []Other   SKIN:   []Normal   []Pressure ulcer(s)  [x]Rash: Please refer to nursing notes for further documentation    CRITICAL CARE:  [ ] Shock Present  [ ]Septic [ ]Cardiogenic [ ]Neurologic [ ]Hypovolemic  [ ]  Vasopressors [ ]  Inotropes   [ ] Respiratory failure present [ ] Mechanical Ventilation [ ] Non-invasive ventilatory support [ ] High-Flow  [ ] Acute  [ ] Chronic [ ] Hypoxic  [ ] Hypercarbic [ ] Other  [ ] Other organ failure     LABS:                         7.5    18.34 )-----------( 339      ( 19 Aug 2020 07:04 )             24.2       140  |  103  |  10  ----------------------------<  115<H>  3.3<L>   |  23  |  0.77    Ca    8.8      19 Aug 2020 07:04  Phos  2.9       Mg     1.8         TPro  7.2  /  Alb  2.7<L>  /  TBili  0.4  /  DBili  x   /  AST  32  /  ALT  21  /  AlkPhos  158<H>    PT/INR - ( 18 Aug 2020 13:40 )   PT: 15.4 sec;   INR: 1.31 ratio         PTT - ( 18 Aug 2020 13:40 )  PTT:29.5 sec  Urinalysis Basic - ( 18 Aug 2020 03:16 )    Color: Yellow / Appearance: Clear / S.027 / pH: x  Gluc: x / Ketone: Negative  / Bili: Negative / Urobili: <2 mg/dL   Blood: x / Protein: Trace / Nitrite: Negative   Leuk Esterase: Negative / RBC: x / WBC x   Sq Epi: x / Non Sq Epi: x / Bacteria: x      RADIOLOGY & ADDITIONAL STUDIES: None new    PROTEIN CALORIE MALNUTRITION:   [x] PPSV2 < or = 30% [] significant weight loss [] poor nutritional intake [] anasarca [] catabolic state   Albumin, Serum: 2.7 g/dL (20 @ 15:40)   Artificial Nutrition []     REFERRALS:   []Chaplaincy  []Hospice  []Child Life  [x]Social Work  []Case management []Holistic Therapy []Physical Therapy []Dietary   Goals of Care Document:   Care Coordination Document: Care Coordination Assessment 201 [C. Provider] (20 @ 12:10)                              Care Coordination Assessment 201    COGNITIVE/LEARNING 201  Mental Status    Answers: Confused,  Answers: Unable to assess    Notes: Mental Status    Notes: Patient has AMS s/p UTI, hx of Dementia.    ADMISSION HISTORY 201  Admitted From    Answers: Acute Hospital    Functional Status Prior to Admission    Answers: Assistive equipment,  Answers: Assistive person    Notes: Functional Status Prior to Admission    Notes: cane, live-in aide    Services Present on Admission    Answers: DME; specify cane    Notes: Services on Admission, Contacts    Notes: Live-in aide    FINANCIAL 201  Does patient have financial concerns for discharge?    Answers: None    LIVING ARRANGEMENTS/SUPPORT 201  Housing Environment    Answers: House,  Answers: Stairs, number of steps 6steps outside/8steps inside    Living Arrangements    Answers: Lives with family    Sources of support/caregivers    Answers: Children,  Answers: Hired Care    Notes: Sources of support    Notes: Son-Yoandy (Emergency contact/HCP 2nd agent) Dtr Sandra Beauchamp (HCP)    CAREGIVER CONTACT 201  Does the patient wish to identify a Caregiver?    Answers: Unable to assess    EMERGENCY CONTACTS OUTSIDE HOME 201  Emergency Contact    Answers: Emergency Contact Name Yoandy Kolb,  Answers: Emergency Contact Phone # [phone],  Answers: Emergency Contact Relationship son    Notes: Emergency Contact:    Notes: Sandra Beauchamp-[phone]    DISCHARGE PLANNING 201  Potential Discharge Plan and Services    Answers: Anticipated Needs Unclear at Present    SCREENING 201  Social Work Screen and Referral    Answers: Adjustment to Illness/Difficulty Coping,  Answers: Major Illness Causing Lifestyles Changes    SUMMARY 201  Initial Clinical Summary    Notes: Social work reviewed medical chart of patient on PCU for psychosocial  assessment and support. As per H&P, patient is a  87-year-old , English  speaking female with HTN, dementia, recent hospitalization at [facility] found to  have L pubic ramus fracture, R sacral fracture, and R UPJ dilation, sent by.  Dr. Hood for abdominal pain and recent decline in functional status. As per  outpatient provider Dr. Hood, pt had a vertebral compression fracture 1 year  ago with no loss of function at that time. However one month ago, patient  developed abdominal pain and decline in functional status.  Pt was seen at  [facility] and diagnosed with sciatica and sent home. On review of imaging, pt  found to have L pubic ramus fracture, R sacral fracture, and R UPJ dilation  size of grapefruit due to obstruction with R kidney chronically shriveled. Dr. Hood spoke to Urology who advised against surgery given pt's chronically  shriveled R kidney; family also opting for more conservative management but  would like to address any reversible causes of pain. During that time, labs and  additional imaging reported to be WNL.     As per son, pt has been having decreased PO intake for the last month along  with loss of function. Pt found to have UTI outpatient, currently on cipro day  6/; son also reports that pt was getting PPX antibiotics 3 times a week prior.  Pt has had abdominal pain for which they were treating with Tylenol with workup  noted above. Also reports that pt has appeared more dyspneic lately. No melena,  hematochezia, cough, diarrhea, constipation, fever.     Patient unable to participate in assessment s/p dementia, AMS s/p UTI. Patient  unable to identify caregiver. LMSW spoke with patient's son Yoandy to introduce  self and explain role. Patient's son confirmed demographics. Patient's son  Yoandy [phone] reports being emergency contact and secondary HCP. Yoandy  identified his sister Sandra Beauchamp [phone] as primary health care proxy.  LMSW request copy of HCP for medical chart. Patient is a  with 4 children  (Son-Yoandy lives in [place] and 3 others lives in [place]).     Patient resides with son Yoandy and his children in a private house with 6steps  to enter and 8steps inside. Patient's son reports that prior to 6weeks ago  patient was more independent, active, able to climb steps inside and willing to  participate in ADLs, however recently becoming more dependent and refusing to  shower as well as disoriented and confused. Patient went to [facility]  informed having UTI and Kidney mass, transferred to [facility] for further medical  management for decline functional status. Patient requires assistance with  ADLs. Patient has a private hire live-in aide for the last 14years, aide at  bedside and will stay over to provide ongoing social support. Patient has cane  to assist with balance, gait. Patient's PCP is Dr. Lynda Hood [phone] or  [phone]. Patient receives her medications from [facility] .  Patient's son drives patient to medical appts.    Anticipated Discharge Plan and Services    Notes: Patient's discharge needs are uncleared at present and will be assessed  when appropriate. Pastoral care remains available. LMSW assured availability  and provide contact number. LMSW remains available for ongoing needs.       Electronically signed by:  Fay Lock  Electronically signed on:  2020  12:09 GAP TEAM PALLIATIVE CARE UNIT PROGRESS NOTE:      [] Patient on hospice program.    INDICATION FOR PALLIATIVE CARE UNIT SERVICES: Workup of abdominal pain    INTERVAL HPI/OVERNIGHT EVENTS: No acute events overnight. Pt feels better and was able to have a normal BM. Received 3 mg Morphine over the past 24 hours for pain.     DNR on chart: Yes  Yes    Allergies    No Known Allergies    Intolerances    MEDICATIONS  (STANDING):  donepezil 10 milliGRAM(s) Oral at bedtime  escitalopram 5 milliGRAM(s) Oral daily  lactated ringers. 1000 milliLiter(s) (100 mL/Hr) IV Continuous <Continuous>  melatonin 3 milliGRAM(s) Oral at bedtime  piperacillin/tazobactam IVPB.. 3.375 Gram(s) IV Intermittent every 8 hours  potassium chloride   Powder 40 milliEquivalent(s) Oral once  senna 2 Tablet(s) Oral at bedtime    MEDICATIONS  (PRN):  acetaminophen   Tablet .. 650 milliGRAM(s) Oral every 6 hours PRN Mild Pain (1 - 3)  bisacodyl Suppository 10 milliGRAM(s) Rectal daily PRN Constipation  morphine  - Injectable 1 milliGRAM(s) IV Push every 4 hours PRN moderate to severe pain    ITEMS UNCHECKED ARE NOT PRESENT    PRESENT SYMPTOMS: [x]Unable to obtain due to poor mentation   Source if other than patient:  [x]Family   [x]Team     Pain: [X] yes [] no  QOL impact - worsening functional status over the last month  Location - right side abdomen                    Aggravating factors - none  Quality - unable to assess  Radiation - none  Timing - 1 month  Severity (0-10 scale): unable to assess  Minimal acceptable level (0-10 scale): unable to assess     Dyspnea:                           []Mild []Moderate []Severe  Anxiety:                             []Mild []Moderate []Severe  Fatigue:                             []Mild []Moderate []Severe  Nausea:                             []Mild []Moderate []Severe  Loss of appetite:              []Mild []Moderate []Severe  Constipation:                    []Mild []Moderate []Severe    PAINAD Score:  http://geriatrictoolkit.[place].[facility]/cog/painad.pdf  		  Other Symptoms:  [x]All other review of systems negative     Karnofsky Performance Score/Palliative Performance Status Version 2:       40  %  http://npcrc.org/files/news/palliative_performance_scale_ppsv2.pdf    PHYSICAL EXAM:  Vital Signs Last 24 Hrs  T(C): 37.1 (19 Aug 2020 08:04), Max: 37.1 (19 Aug 2020 08:04)  T(F): 98.8 (19 Aug 2020 08:04), Max: 98.8 (19 Aug 2020 08:04)  HR: 98 (19 Aug 2020 08:04) (90 - 100)  BP: 130/68 (19 Aug 2020 08:04) (116/64 - 130/68)  RR: 18 (19 Aug 2020 08:04) (18 - 18)  SpO2: 90% (19 Aug 2020 08:04) (90% - 94%)     I&O's Summary    18 Aug 2020 07:01  -  19 Aug 2020 07:00  --------------------------------------------------------  IN: 0 mL / OUT: 500 mL / NET: -500 mL      GENERAL:  [X]Alert  [X]Oriented x 1 (person)   []Lethargic  []Cachexia  []Unarousable  [X]Verbal  []Non-Verbal  Behavioral:   [] Anxiety  [] Delirium [] Agitation [] Other  HEENT:  []Normal   [X] Dry mouth   []ET Tube/Trach  []Oral lesions  PULMONARY:   [x]Clear []Tachypnea  []Audible excessive secretions   []Rhonchi        []Right []Left []Bilateral  []Crackles        []Right []Left []Bilateral  []Wheezing     []Right []Left []Bilateral  CARDIOVASCULAR:    [x]Regular []Irregular []Tachy  []Justyn []Murmur []Other  GASTROINTESTINAL:  [x]Soft []Distended [x]+BS [x]Non tender []Tender []PEG []OGT/ NGT   Last BM:       GENITOURINARY:  []Normal [x] Incontinent   []Oliguria/Anuria   []Roth  MUSCULOSKELETAL:   []Normal  [X]Weakness  [x]Bed/Wheelchair bound []Edema  NEUROLOGIC:   []No focal deficits  [x]Cognitive impairment  []Dysphagia []Dysarthria []Paresis []Other   SKIN:   []Normal   []Pressure ulcer(s)  [x]Rash: Please refer to nursing notes for further documentation    CRITICAL CARE:  [ ] Shock Present  [ ]Septic [ ]Cardiogenic [ ]Neurologic [ ]Hypovolemic  [ ]  Vasopressors [ ]  Inotropes   [ ] Respiratory failure present [ ] Mechanical Ventilation [ ] Non-invasive ventilatory support [ ] High-Flow  [ ] Acute  [ ] Chronic [ ] Hypoxic  [ ] Hypercarbic [ ] Other  [ ] Other organ failure     LABS:                         7.5    18.34 )-----------( 339      ( 19 Aug 2020 07:04 )             24.2       140  |  103  |  10  ----------------------------<  115<H>  3.3<L>   |  23  |  0.77    Ca    8.8      19 Aug 2020 07:04  Phos  2.9       Mg     1.8         TPro  7.2  /  Alb  2.7<L>  /  TBili  0.4  /  DBili  x   /  AST  32  /  ALT  21  /  AlkPhos  158<H>    PT/INR - ( 18 Aug 2020 13:40 )   PT: 15.4 sec;   INR: 1.31 ratio         PTT - ( 18 Aug 2020 13:40 )  PTT:29.5 sec  Urinalysis Basic - ( 18 Aug 2020 03:16 )    Color: Yellow / Appearance: Clear / S.027 / pH: x  Gluc: x / Ketone: Negative  / Bili: Negative / Urobili: <2 mg/dL   Blood: x / Protein: Trace / Nitrite: Negative   Leuk Esterase: Negative / RBC: x / WBC x   Sq Epi: x / Non Sq Epi: x / Bacteria: x      RADIOLOGY & ADDITIONAL STUDIES: None new    PROTEIN CALORIE MALNUTRITION:   [x] PPSV2 < or = 30% [] significant weight loss [] poor nutritional intake [] anasarca [] catabolic state   Albumin, Serum: 2.7 g/dL (20 @ 15:40)   Artificial Nutrition []     REFERRALS:   []Chaplaincy  []Hospice  []Child Life  [x]Social Work  []Case management []Holistic Therapy []Physical Therapy []Dietary   Goals of Care Document:   Care Coordination Document: Care Coordination Assessment 201 [C. Provider] (20 @ 12:10)                              Care Coordination Assessment 201    COGNITIVE/LEARNING 201  Mental Status    Answers: Confused,  Answers: Unable to assess    Notes: Mental Status    Notes: Patient has AMS s/p UTI, hx of Dementia.    ADMISSION HISTORY 201  Admitted From    Answers: Acute Hospital    Functional Status Prior to Admission    Answers: Assistive equipment,  Answers: Assistive person    Notes: Functional Status Prior to Admission    Notes: cane, live-in aide    Services Present on Admission    Answers: DME; specify cane    Notes: Services on Admission, Contacts    Notes: Live-in aide    FINANCIAL 201  Does patient have financial concerns for discharge?    Answers: None    LIVING ARRANGEMENTS/SUPPORT 201  Housing Environment    Answers: House,  Answers: Stairs, number of steps 6steps outside/8steps inside    Living Arrangements    Answers: Lives with family    Sources of support/caregivers    Answers: Children,  Answers: Hired Care    Notes: Sources of support    Notes: Son-Yoandy (Emergency contact/HCP 2nd agent) Dtr Sandra Beauchamp (HCP)    CAREGIVER CONTACT 201  Does the patient wish to identify a Caregiver?    Answers: Unable to assess    EMERGENCY CONTACTS OUTSIDE HOME 201  Emergency Contact    Answers: Emergency Contact Name Yoandy Kolb,  Answers: Emergency Contact Phone # [phone],  Answers: Emergency Contact Relationship son    Notes: Emergency Contact:    Notes: Sandra Beauchamp-[phone]    DISCHARGE PLANNING 201  Potential Discharge Plan and Services    Answers: Anticipated Needs Unclear at Present    SCREENING 201  Social Work Screen and Referral    Answers: Adjustment to Illness/Difficulty Coping,  Answers: Major Illness Causing Lifestyles Changes    SUMMARY 201  Initial Clinical Summary    Notes: Social work reviewed medical chart of patient on PCU for psychosocial  assessment and support. As per H&P, patient is a  87-year-old , English  speaking female with HTN, dementia, recent hospitalization at [place] found to  have L pubic ramus fracture, R sacral fracture, and R UPJ dilation, sent by.  Dr. Hood for abdominal pain and recent decline in functional status. As per  outpatient provider Dr. Hood, pt had a vertebral compression fracture 1 year  ago with no loss of function at that time. However one month ago, patient  developed abdominal pain and decline in functional status.  Pt was seen at  [place] and diagnosed with sciatica and sent home. On review of imaging, pt  found to have L pubic ramus fracture, R sacral fracture, and R UPJ dilation  size of grapefruit due to obstruction with R kidney chronically shriveled. Dr. Hood spoke to Urology who advised against surgery given pt's chronically  shriveled R kidney; family also opting for more conservative management but  would like to address any reversible causes of pain. During that time, labs and  additional imaging reported to be WNL.     As per son, pt has been having decreased PO intake for the last month along  with loss of function. Pt found to have UTI outpatient, currently on cipro day  6; son also reports that pt was getting PPX antibiotics 3 times a week prior.  Pt has had abdominal pain for which they were treating with Tylenol with workup  noted above. Also reports that pt has appeared more dyspneic lately. No melena,  hematochezia, cough, diarrhea, constipation, fever.     Patient unable to participate in assessment s/p dementia, AMS s/p UTI. Patient  unable to identify caregiver. LMSW spoke with patient's son Yoandy to introduce  self and explain role. Patient's son confirmed demographics. Patient's son  Yoandy [phone] reports being emergency contact and secondary HCP. Yoandy  identified his sister Sandra Beauchamp [phone] as primary health care proxy.  LMSW request copy of HCP for medical chart. Patient is a  with 4 children  (Son-Yoandy lives in [place] and 3 others lives in [place]).     Patient resides with son Yoandy and his children in a private house with 6steps  to enter and 8steps inside. Patient's son reports that prior to 6weeks ago  patient was more independent, active, able to climb steps inside and willing to  participate in ADLs, however recently becoming more dependent and refusing to  shower as well as disoriented and confused. Patient went to [facility]  informed having UTI and Kidney mass, transferred to [facility] for further medical  management for decline functional status. Patient requires assistance with  ADLs. Patient has a private hire live-in aide for the last 14years, aide at  bedside and will stay over to provide ongoing social support. Patient has cane  to assist with balance, gait. Patient's PCP is Dr. Lynda Hood [phone] or  [phone]. Patient receives her medications from [facility] .  Patient's son drives patient to medical appts.    Anticipated Discharge Plan and Services    Notes: Patient's discharge needs are uncleared at present and will be assessed  when appropriate. Pastoral care remains available. LMSW assured availability  and provide contact number. LMSW remains available for ongoing needs.       Electronically signed by:  Fay Lock  Electronically signed on:  2020  12:09 GAP TEAM PALLIATIVE CARE UNIT PROGRESS NOTE:      [] Patient on hospice program.    INDICATION FOR PALLIATIVE CARE UNIT SERVICES: Workup of abdominal pain    INTERVAL HPI/OVERNIGHT EVENTS: No acute events overnight. Pt feels better and was able to have a normal BM. Received 3 mg Morphine over the past 24 hours for pain.     DNR on chart: Yes  Yes    Allergies    No Known Allergies    Intolerances    MEDICATIONS  (STANDING):  donepezil 10 milliGRAM(s) Oral at bedtime  escitalopram 5 milliGRAM(s) Oral daily  lactated ringers. 1000 milliLiter(s) (100 mL/Hr) IV Continuous <Continuous>  melatonin 3 milliGRAM(s) Oral at bedtime  piperacillin/tazobactam IVPB.. 3.375 Gram(s) IV Intermittent every 8 hours  potassium chloride   Powder 40 milliEquivalent(s) Oral once  senna 2 Tablet(s) Oral at bedtime    MEDICATIONS  (PRN):  acetaminophen   Tablet .. 650 milliGRAM(s) Oral every 6 hours PRN Mild Pain (1 - 3)  bisacodyl Suppository 10 milliGRAM(s) Rectal daily PRN Constipation  morphine  - Injectable 1 milliGRAM(s) IV Push every 4 hours PRN moderate to severe pain    ITEMS UNCHECKED ARE NOT PRESENT    PRESENT SYMPTOMS: [x]Unable to obtain due to poor mentation   Source if other than patient:  [x]Family   [x]Team     Pain: [X] yes [] no  QOL impact - worsening functional status over the last month  Location - right side abdomen                    Aggravating factors - none  Quality - unable to assess  Radiation - none  Timing - 1 month  Severity (0-10 scale): unable to assess  Minimal acceptable level (0-10 scale): unable to assess     Dyspnea:                           []Mild []Moderate []Severe  Anxiety:                             []Mild []Moderate []Severe  Fatigue:                             []Mild []Moderate []Severe  Nausea:                             []Mild []Moderate []Severe  Loss of appetite:              []Mild []Moderate []Severe  Constipation:                    []Mild []Moderate []Severe    PAINAD Score:  http://geriatrictoolkit.[place].[facility]/cog/painad.pdf  		  Other Symptoms:  [x]All other review of systems negative     Karnofsky Performance Score/Palliative Performance Status Version 2:       40  %  http://npcrc.org/files/news/palliative_performance_scale_ppsv2.pdf    PHYSICAL EXAM:  Vital Signs Last 24 Hrs  T(C): 37.1 (19 Aug 2020 08:04), Max: 37.1 (19 Aug 2020 08:04)  T(F): 98.8 (19 Aug 2020 08:04), Max: 98.8 (19 Aug 2020 08:04)  HR: 98 (19 Aug 2020 08:04) (90 - 100)  BP: 130/68 (19 Aug 2020 08:04) (116/64 - 130/68)  RR: 18 (19 Aug 2020 08:04) (18 - 18)  SpO2: 90% (19 Aug 2020 08:04) (90% - 94%)     I&O's Summary    18 Aug 2020 07:01  -  19 Aug 2020 07:00  --------------------------------------------------------  IN: 0 mL / OUT: 500 mL / NET: -500 mL      GENERAL:  [X]Alert  [X]Oriented x 1 (person)   []Lethargic  []Cachexia  []Unarousable  [X]Verbal  []Non-Verbal  Behavioral:   [] Anxiety  [x] Delirium [] Agitation [] Other  HEENT:  []Normal   [X] Dry mouth   []ET Tube/Trach  []Oral lesions  PULMONARY:   [x]Clear []Tachypnea  []Audible excessive secretions   []Rhonchi        []Right []Left []Bilateral  []Crackles        []Right []Left []Bilateral  []Wheezing     []Right []Left []Bilateral  CARDIOVASCULAR:    [x]Regular []Irregular []Tachy  []Justyn []Murmur []Other  GASTROINTESTINAL:  [x]Soft []Distended [x]+BS [x]Non tender []Tender []PEG []OGT/ NGT   Last BM:       GENITOURINARY:  []Normal [x] Incontinent   []Oliguria/Anuria   [x]Roth  MUSCULOSKELETAL:   []Normal  [X]Weakness  [x]Bed/Wheelchair bound []Edema  NEUROLOGIC:   []No focal deficits  [x]Cognitive impairment  []Dysphagia []Dysarthria []Paresis []Other   SKIN:   []Normal   []Pressure ulcer(s)  [x]Rash: Please refer to nursing notes for further documentation    CRITICAL CARE:  [ ] Shock Present  [ ]Septic [ ]Cardiogenic [ ]Neurologic [ ]Hypovolemic  [ ]  Vasopressors [ ]  Inotropes   [ ] Respiratory failure present [ ] Mechanical Ventilation [ ] Non-invasive ventilatory support [ ] High-Flow  [ ] Acute  [ ] Chronic [ ] Hypoxic  [ ] Hypercarbic [ ] Other  [x ] Other organ failure brain - dementia    LABS:                         7.5    18.34 )-----------( 339      ( 19 Aug 2020 07:04 )             24.2   08-    140  |  103  |  10  ----------------------------<  115<H>  3.3<L>   |  23  |  0.77    Ca    8.8      19 Aug 2020 07:04  Phos  2.9       Mg     1.8         TPro  7.2  /  Alb  2.7<L>  /  TBili  0.4  /  DBili  x   /  AST  32  /  ALT  21  /  AlkPhos  158<H>  0817  PT/INR - ( 18 Aug 2020 13:40 )   PT: 15.4 sec;   INR: 1.31 ratio         PTT - ( 18 Aug 2020 13:40 )  PTT:29.5 sec  Urinalysis Basic - ( 18 Aug 2020 03:16 )    Color: Yellow / Appearance: Clear / S.027 / pH: x  Gluc: x / Ketone: Negative  / Bili: Negative / Urobili: <2 mg/dL   Blood: x / Protein: Trace / Nitrite: Negative   Leuk Esterase: Negative / RBC: x / WBC x   Sq Epi: x / Non Sq Epi: x / Bacteria: x      RADIOLOGY & ADDITIONAL STUDIES: None new    PROTEIN CALORIE MALNUTRITION:   [x] PPSV2 < or = 30% [] significant weight loss [] poor nutritional intake [] anasarca [] catabolic state   Albumin, Serum: 2.7 g/dL (20 @ 15:40)   Artificial Nutrition []     REFERRALS:   []Chaplaincy  []Hospice  []Child Life  [x]Social Work  []Case management []Holistic Therapy []Physical Therapy []Dietary   Goals of Care Document:   Care Coordination Document: Care Coordination Assessment 201 [C. Provider] (20 @ 12:10)                              Care Coordination Assessment 201    COGNITIVE/LEARNING 201  Mental Status    Answers: Confused,  Answers: Unable to assess    Notes: Mental Status    Notes: Patient has AMS s/p UTI, hx of Dementia.    ADMISSION HISTORY 201  Admitted From    Answers: Acute Hospital    Functional Status Prior to Admission    Answers: Assistive equipment,  Answers: Assistive person    Notes: Functional Status Prior to Admission    Notes: cane, live-in aide    Services Present on Admission    Answers: DME; specify cane    Notes: Services on Admission, Contacts    Notes: Live-in aide    FINANCIAL 201  Does patient have financial concerns for discharge?    Answers: None    LIVING ARRANGEMENTS/SUPPORT 201  Housing Environment    Answers: House,  Answers: Stairs, number of steps 6steps outside/8steps inside    Living Arrangements    Answers: Lives with family    Sources of support/caregivers    Answers: Children,  Answers: Hired Care    Notes: Sources of support    Notes: Son-Yoandy (Emergency contact/HCP 2nd agent) Dtr Sandra Beauchamp (HCP)    CAREGIVER CONTACT 201  Does the patient wish to identify a Caregiver?    Answers: Unable to assess    EMERGENCY CONTACTS OUTSIDE HOME 201  Emergency Contact    Answers: Emergency Contact Name Yoandy Kolb,  Answers: Emergency Contact Phone # [phone],  Answers: Emergency Contact Relationship son    Notes: Emergency Contact:    Notes: Sandra Beauchamp-[phone]    DISCHARGE PLANNING 201  Potential Discharge Plan and Services    Answers: Anticipated Needs Unclear at Present    SCREENING 201  Social Work Screen and Referral    Answers: Adjustment to Illness/Difficulty Coping,  Answers: Major Illness Causing Lifestyles Changes    SUMMARY 201  Initial Clinical Summary    Notes: Social work reviewed medical chart of patient on PCU for psychosocial  assessment and support. As per H&P, patient is a  87-year-old , English  speaking female with HTN, dementia, recent hospitalization at [place] found to  have L pubic ramus fracture, R sacral fracture, and R UPJ dilation, sent by.  Dr. Hood for abdominal pain and recent decline in functional status. As per  outpatient provider Dr. Hood, pt had a vertebral compression fracture 1 year  ago with no loss of function at that time. However one month ago, patient  developed abdominal pain and decline in functional status.  Pt was seen at  [place] and diagnosed with sciatica and sent home. On review of imaging, pt  found to have L pubic ramus fracture, R sacral fracture, and R UPJ dilation  size of grapefruit due to obstruction with R kidney chronically shriveled. Dr. Hood spoke to Urology who advised against surgery given pt's chronically  shriveled R kidney; family also opting for more conservative management but  would like to address any reversible causes of pain. During that time, labs and  additional imaging reported to be WNL.     As per son, pt has been having decreased PO intake for the last month along  with loss of function. Pt found to have UTI outpatient, currently on cipro day  ; son also reports that pt was getting PPX antibiotics 3 times a week prior.  Pt has had abdominal pain for which they were treating with Tylenol with workup  noted above. Also reports that pt has appeared more dyspneic lately. No melena,  hematochezia, cough, diarrhea, constipation, fever.     Patient unable to participate in assessment s/p dementia, AMS s/p UTI. Patient  unable to identify caregiver. LMSW spoke with patient's son Yoandy to introduce  self and explain role. Patient's son confirmed demographics. Patient's son  Yoandy [phone] reports being emergency contact and secondary HCP. Yoandy  identified his sister Sandra Beauchamp [phone] as primary health care proxy.  LMSW request copy of HCP for medical chart. Patient is a  with 4 children  (Son-Yoandy lives in [place] and 3 others lives in [place]).     Patient resides with son Yoandy and his children in a private house with 6steps  to enter and 8steps inside. Patient's son reports that prior to 6weeks ago  patient was more independent, active, able to climb steps inside and willing to  participate in ADLs, however recently becoming more dependent and refusing to  shower as well as disoriented and confused. Patient went to [facility]  informed having UTI and Kidney mass, transferred to [facility] for further medical  management for decline functional status. Patient requires assistance with  ADLs. Patient has a private hire live-in aide for the last 14years, aide at  bedside and will stay over to provide ongoing social support. Patient has cane  to assist with balance, gait. Patient's PCP is Dr. Lynda Hood [phone] or  [phone]. Patient receives her medications from [facility].  Patient's son drives patient to medical appts.    Anticipated Discharge Plan and Services    Notes: Patient's discharge needs are uncleared at present and will be assessed  when appropriate. Pastoral care remains available. LMSW assured availability  and provide contact number. LMSW remains available for ongoing needs.       Electronically signed by:  Fay Lock  Electronically signed on:  2020  12:09

## 2020-08-19 NOTE — PROGRESS NOTE ADULT - PROBLEM SELECTOR PLAN 3
- Likely in the setting of R UPJ dilation due to obstruction w/ R kidney chronically shriveled   - Required straight cath x2 overnight  - Monitor for need for martinez  - F/u CT abdomen/pelvis - Likely in the setting of R UPJ dilation due to obstruction w/ R kidney chronically shriveled. Dilation worsening per CT 8/18/20.  - Urology consulted, pt not a candidate for nephrectomy.   - IR consulted. Pt candidate for r nephrostomy tube placement today.   - Monitor for need for martinez - Likely in the setting of R UPJ dilation due to obstruction w/ R kidney chronically shriveled. Dilation worsening per CT 8/18/20.  - Urology consulted, pt not a candidate for nephrectomy.   - IR consulted. Pt candidate for r nephrostomy tube placement today. K=3.3 on 8/19/20, repleted.   - Monitor for need for martinez - Likely in the setting of R UPJ dilation due to obstruction w/ R kidney chronically shriveled. Dilation worsening per CT 8/18/20.  - Urology consulted, pt not a candidate for nephrectomy.   - IR consulted. Pt candidate for r nephrostomy tube placement today. K=3.3 on 8/19/20, repleted.   - martinez catheter in place - Likely in the setting of R UPJ dilation due to obstruction w/ R kidney chronically shriveled. Dilation worsening per CT 8/18/20.  - martinez catheter in place

## 2020-08-19 NOTE — PROGRESS NOTE ADULT - PROBLEM SELECTOR PLAN 4
- Hb 9.2 MCV 92 (was normal prior to this month)   - No evidence of bleeding  - Monitor CBC  - Will send iron studies, folate, B12, TSH   - Maintain active T&S - Hb 7.5 MCV 91.7 today. Hb downtrending further from 9.2 yesterday. Likely multifactorial anemia due to inflammation and dilution from IV fluids. Iron studies show decreased TIBC.   - No evidence of bleeding, but will assess guaiac.   - Monitor CBC  - Follow-up on folate, B12, TSH   - Maintain active T&S - Hb 7.5 MCV 91.7 today. Hb downtrending further from 9.2 yesterday. Likely multifactorial anemia due to inflammation and dilution from IV fluids. Iron studies show decreased TIBC.   - No evidence of bleeding, but will assess guaiac.   - Monitor AM CBC  - Follow-up on folate, B12, TSH   - Maintain active T&S

## 2020-08-19 NOTE — CONSULT NOTE ADULT - ATTENDING COMMENTS
As above, will place right PCN although no sig parenchyma remaining there is mass-effect from massive hydro.  Will obtain consent from family.
Aside from a high white count there isn't much to suggest she's septic or even infected. Surprisingly IR drained 1.4L of purulence when placing a right nephrostomy today.   E. coli in urine from 7/5/20 was only R to Amp/Unasyn.   Continue empiric Zosyn and f/u cultures     Julio Rea MD   Infectious Disease   Pager [phone]   After 5PM and on weekends please page fellow on call or call [phone]

## 2020-08-19 NOTE — PRE-OP CHECKLIST - MEDICAL/PEDIATRIC CLEARANCE ON MEDICAL RECORD
o2 saturation was 88, on room air, nasal canula 2l /minute placed, o2 sat 96% anesthesia at bed side

## 2020-08-19 NOTE — PROGRESS NOTE ADULT - PROBLEM SELECTOR PLAN 8
Hold amlodipine  Monitor BP, restart as indicated Diet: soft  DVT PPX: restart heparin SQ after IR procedure  GOC: DNR/DNI   Dispo: pending clinical improvement

## 2020-08-19 NOTE — PROGRESS NOTE ADULT - PROBLEM SELECTOR PLAN 6
- Pt w/ recent decline in functional status over the last month, less ambulatory. Prior to that ambulated with cane  - Consider PT eval   - Supportive care - Currently AOx1   - C/w donepezil

## 2020-08-19 NOTE — PROGRESS NOTE ADULT - PROBLEM SELECTOR PLAN 9
Diet: regular  DVT PPX: heparin SQ   GOC: DNR/DNI   Dispo: pending clinical improvement Transitions of Care Status:  1.  Name of PCP: Dr. Hood   2.  PCP Contacted on Admission: [x] Y    [ ] N    3.  PCP contacted at Discharge: [ ] Y    [ ] N    [ ] N/A  4.  Post-Discharge Appointment Date and Location:  5.  Summary of Handoff given to PCP:

## 2020-08-19 NOTE — PROGRESS NOTE ADULT - SUBJECTIVE AND OBJECTIVE BOX
Interventional Radiology Procedure Note    Procedure: Right PCN    Indication: hydronephrosis    Operators: Aysha    Anesthesia (type): IV sedation    Contrast: 13 cc    EBL: minimal    Findings/Follow up Plan of Care:  Right PCN placed using US and fluoro  guidance  yielded approx 1400 cc of pus - specimen sent for culture.  Full report to follow.    Specimens Removed:  culture    Implants: 8 Estonian drain    Complications: none    Condition/Disposition:  PACU then floors.    Please call Interventional Radiology x [phone] with any questions, concerns, or issues.

## 2020-08-19 NOTE — PROGRESS NOTE ADULT - SUBJECTIVE AND OBJECTIVE BOX
Interventional Radiology     87y Female with PMH as below with dementia, right atrophic kidney with CT with significantly dilated right collecting system with minimal parenchyma (Ct on 2019 showed an atrophic kidney with no dilation).  Pt was seen by Urology and suspected that mass effect of this dilated system likely causing/contributing to patient's pain and pt is not a candidate for nephrectomy.  Urology recommending R nephrostomy tube placement to decompress kidney.     PAST MEDICAL & SURGICAL HISTORY:  Dementia  HTN (hypertension)  History of hip surgery    Allergies  No Known Allergies    Labs:                        7.5    18.34 )-----------( 339      ( 19 Aug 2020 07:04 )             24.2     08-19    140  |  103  |  10  ----------------------------<  115<H>  3.3<L>   |  23  |  0.77    Ca    8.8      19 Aug 2020 07:04  Phos  2.9     08-19  Mg     1.8     08-19    TPro  7.2  /  Alb  2.7<L>  /  TBili  0.4  /  DBili  x   /  AST  32  /  ALT  21  /  AlkPhos  158<H>  08-17    PT/INR - ( 18 Aug 2020 13:40 )   PT: 15.4 sec;   INR: 1.31 ratio    PTT - ( 18 Aug 2020 13:40 )  PTT:29.5 sec      After risks, benefits, alternatives discussion on telephone with the patient's son Yoandy Kolb he verbalized understanding and gave verbal telephone consent that was documented.  He also suspended the DNR status for duration of the procedure and recovery time.    Vinnie Mccann, TIMMY-TIFFANY  [facility] [id number]  Ext [phone]

## 2020-08-19 NOTE — PRE-ANESTHESIA EVALUATION ADULT - NSANTHOSAYNRD_GEN_A_CORE
No. MATHIEU screening performed.  STOP BANG Legend: 0-2 = LOW Risk; 3-4 = INTERMEDIATE Risk; 5-8 = HIGH Risk

## 2020-08-19 NOTE — CONSULT NOTE ADULT - ASSESSMENT
Assessment:  The patient is an 87-year-old female with past medical history of hypertension, dementia, left pubic ramus fracture, right sacral fracture, and right sided uteropelvic dilation who was sent to the hospital for recent decline in functional status. As per chart review, the patient had a vertebral compression fracture 1 year ago with no loss of function at that time. However one month ago, patient developed abdominal pain and decline in functional status.  She was evaluated at [facility] and diagnosed with sciatica and sent home. On review of imaging, she was found to have a left pubic ramus fracture, right sacral fracture, and and right uteropelvic dilation size of grapefruit due to obstruction with a right sided atrophic kidney. The patient was also noted to have decreased oral intake during this time. She was being treated for a urinary tract infection completed with oral ciprofloxacin. She was complaining of abdominal pain but denies fever, chills, chest pain, dizziness, palpitations, nausea, vomiting, diarrhea, or constipation. She has a home health aide that assists her with activities of daily living. History provided by daughter and HHA at bedside. Patient is a poor historian due to underlying dementia. Urine cultures from prior admission were growing pansensitive E. coli. 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 Infectious disease was consulted for evaluation of sepsis due to right sided hydronephrosis.    Plan:  # Sepsis due to right sided hydronephrosis due to atrophic right kidney  - Continue intravenous piperacillin-tazobactam 3.375 g q8 hours  - Await final abdominal fluid cultures sent by interventional radiology  - Family would like conservative management at this time  - Monitor fever curve  - Trend CBC daily   - ID will continue to follow    Case seen and discussed with Dr. Rea at bedside    Alejandro Winn MD PGY-4   Fellow, Infectious Diseases   Pager: [phone]  If no response, after 5pm and on weekends: Call [phone] 87F was admitted to [facility] in July for pain, reportedly found to have fractures of the pubic ramus and right sacrum, also treated for an E. coli UTI.   Admitted for decline in functional status.   She had a known chronic mild-moderate right hydroureteronephrosis with an atrophic right kidney since last year, now the obstruction has worsened.   s/p IR nephrostomy today with 1.4L of pus     Plan:  - Continue intravenous piperacillin-tazobactam 3.375 g q8 hours  - Await final abdominal fluid cultures sent by interventional radiology  - Family would like conservative management at this time  - Monitor fever curve  - Trend CBC daily   - ID will continue to follow    Case seen and discussed with Dr. Rea at bedside    Alejandro Winn MD PGY-4   Fellow, Infectious Diseases   Pager: [phone]  If no response, after 5pm and on weekends: Call [phone]

## 2020-08-19 NOTE — PROGRESS NOTE ADULT - PROBLEM SELECTOR PLAN 2
- Pt w/ recent imaging at [place] found to have L pubic ramus fracture, R sacral fracture, and R UPJ dilation size of grapefruit due to obstruction with R kidney chronically shriveled Dr. Hood spoke to Urology, no surgical intervention currently being planned due to shriveled kidney state   - Family interested in more conservative management but would like to address any reversible causes of pain  - F/u CT abdomen/pelvis  - Tylenol PRN for mild pain, Morphine 1 mg q4 PRN for moderate-severe pain - Pt w/ recent imaging at [place] found to have L pubic ramus fracture, R sacral fracture, and R UPJ dilation size of grapefruit due to obstruction with R kidney chronically shriveled.  Dr. Hood spoke to Urology, no surgical intervention currently being planned due to shriveled kidney state   - CT 8/18/20 here showed increased right renal collecting system and right renal pelvis   - Family interested in more conservative management but would like to address any reversible causes of pain  - Tylenol PRN for mild pain, Morphine 1 mg q4 PRN for moderate-severe pain - Pt w/ recent imaging at [place] found to have L pubic ramus fracture, R sacral fracture, and R UPJ dilation size of grapefruit due to obstruction with R kidney chronically shriveled.  Dr. Hood spoke to Urology, no surgical intervention currently being planned due to shriveled kidney state   - CT 8/18/20 here showed further increased right renal collecting system and right renal pelvis size  - Urology consulted, pt not a candidate for nephrectomy.   - IR consulted. Pt candidate for r nephrostomy tube placement today.   - Family interested in more conservative management but would like to address any reversible causes of pain  - Tylenol PRN for mild pain, Morphine 1 mg q4 PRN for moderate-severe pain - Pt w/ recent imaging at [place] found to have L pubic ramus fracture, R sacral fracture, and R UPJ dilation size of grapefruit due to obstruction with R kidney chronically shriveled.  Dr. Hood spoke to Urology, no surgical intervention currently being planned due to shriveled kidney state   - CT 8/18/20 here showed further increased right renal collecting system and right renal pelvis size  - Urology consulted, pt not a candidate for nephrectomy.   - IR consulted. Pt candidate for r nephrostomy tube placement today. K=3.3 on 8/19/20, repleted.   - Family interested in more conservative management but would like to address any reversible causes of pain  - Tylenol PRN for mild pain, Morphine 1 mg q4 PRN for moderate-severe pain - Pt w/ recent imaging at [place] found to have L pubic ramus fracture, R sacral fracture, and R UPJ dilation size of grapefruit due to obstruction with R kidney chronically shriveled.  Dr. Hood spoke to Urology, no surgical intervention currently being planned due to shriveled kidney state   - CT 8/18/20 here showed further increased right renal collecting system and right renal pelvis size  - Urology consulted, pt not a candidate for nephrectomy.   - Pt going for R IR nephrostomy tube placement today.   - K=3.3 on 8/19/20, repleted.   - Family interested in more conservative management but would like to address any reversible causes of pain  - Tylenol PRN for mild pain, Morphine 1 mg q4 PRN for moderate-severe pain - Pt w/ recent imaging at [place] found to have L pubic ramus fracture, R sacral fracture, and R UPJ dilation size of grapefruit due to obstruction with R kidney chronically shriveled.  Dr. Hood spoke to Urology, no surgical intervention currently being planned due to shriveled kidney state   - CT 8/18/20 here showed further increased right renal collecting system and right renal pelvis size  - Urology consulted, pt not a candidate for nephrectomy.   - s/p R nephrostomy tube today  - K=3.3 on 8/19/20, repleted.   - Family interested in more conservative management but would like to address any reversible causes of pain  - Continue Tylenol PRN for mild pain, Morphine 1 mg q4 PRN for moderate-severe pain

## 2020-08-19 NOTE — CONSULT NOTE ADULT - SUBJECTIVE AND OBJECTIVE BOX
Patient is a 87y old  Female who presents with a chief complaint of abdominal pain (19 Aug 2020 13:54)    HPI:  This is a 87-year-old female with HTN, dementia, recent hospitalization at [place] found to have L pubic ramus fracture, R sacral fracture, and R UPJ dilation, sent by. Dr. Hood for abdominal pain and recent decline in functional status. As per outpatient provider Dr. Hood, pt had a vertebral compression fracture 1 year ago with no loss of function at that time. However one month ago, patient developed abdominal pain and decline in functional status.  Pt was seen at [place] and diagnosed with sciatica and sent home. On review of imaging, pt found to have L pubic ramus fracture, R sacral fracture, and R UPJ dilation size of grapefruit due to obstruction with R kidney chronically shriveled. Dr. Hood spoke to Urology who advised against surgery given pt's chronically shriveled R kidney; family also opting for more conservative management but would like to address any reversible causes of pain. During that time, labs and additional imaging reported to be WNL.     As per son, pt has been having decreased PO intake for the last month along with loss of function. Pt found to have UTI outpatient, currently on cipro day 6/; son also reports that pt was getting PPX antibiotics 3 times a week prior. Pt has had abdominal pain for which they were treating with Tylenol with workup noted above. Also reports that pt has appeared more dyspneic lately. No melena, hematochezia, cough, diarrhea, constipation, fever.     Pt's baseline status is ambulatory with a cane, has a HHA that assists with ADLs, lives with son, dementia with significant short term memory loss.     In the ED, pt's vitals were T 97.5 HR 90 /71 RR 24 POX 94% 2L NC. (17 Aug 2020 15:02)     prior hospital charts reviewed [  ]  primary team notes reviewed [  ]  other consultant notes reviewed [  ]  PAST MEDICAL & SURGICAL HISTORY:  Dementia  HTN (hypertension)  History of hip surgery    Allergies  No Known Allergies    ANTIMICROBIALS (past 90 days)  MEDICATIONS  (STANDING):  cefTRIAXone   IVPB   100 mL/Hr IV Intermittent (20 @ 08:55)   100 mL/Hr IV Intermittent (20 @ 21:24)    piperacillin/tazobactam IVPB.   200 mL/Hr IV Intermittent (20 @ 13:28)    piperacillin/tazobactam IVPB..   25 mL/Hr IV Intermittent (20 @ 05:02)   25 mL/Hr IV Intermittent (20 @ 21:50)    piperacillin/tazobactam IVPB..   25 mL/Hr IV Intermittent (20 @ 14:17)      ANTIMICROBIALS:    piperacillin/tazobactam IVPB.. 3.375 every 8 hours    OTHER MEDS: MEDICATIONS  (STANDING):  acetaminophen   Tablet .. 650 every 6 hours PRN  bisacodyl Suppository 10 daily PRN  donepezil 10 at bedtime  escitalopram 5 daily  melatonin 3 at bedtime  morphine  - Injectable 1 every 4 hours PRN  senna 2 at bedtime    SOCIAL HISTORY:   hx smoking  non-smoker    FAMILY HISTORY:  FH: dementia    REVIEW OF SYSTEMS  [  ] ROS unobtainable because:    [  ] All other systems negative except as noted below:	    Constitutional:  [ ] fever [ ] chills  [ ] weight loss  [ ] weakness  Skin:  [ ] rash [ ] phlebitis	  Eyes: [ ] icterus [ ] pain  [ ] discharge	  ENMT: [ ] sore throat  [ ] thrush [ ] ulcers [ ] exudates  Respiratory: [ ] dyspnea [ ] hemoptysis [ ] cough [ ] sputum	  Cardiovascular:  [ ] chest pain [ ] palpitations [ ] edema	  Gastrointestinal:  [ ] nausea [ ] vomiting [ ] diarrhea [ ] constipation [ ] pain	  Genitourinary:  [ ] dysuria [ ] frequency [ ] hematuria [ ] discharge [ ] flank pain  [ ] incontinence  Musculoskeletal:  [ ] myalgias [ ] arthralgias [ ] arthritis  [ ] back pain  Neurological:  [ ] headache [ ] seizures  [ ] confusion/altered mental status  Psychiatric:  [ ] anxiety [ ] depression	  Hematology/Lymphatics:  [ ] lymphadenopathy  Endocrine:  [ ] adrenal [ ] thyroid  Allergic/Immunologic:	 [ ] transplant [ ] seasonal    Vital Signs Last 24 Hrs  T(F): 97.2 (20 @ 15:09), Max: 98.8 (20 @ 08:04)  Vital Signs Last 24 Hrs  HR: 94 (20 @ 15:09) (71 - 98)  BP: 133/74 (20 @ 15:09) (100/65 - 133/74)  RR: 20 (20 @ 15:09)  SpO2: 91% (20 @ 15:09) (88% - 100%)  Wt(kg): --    EXAM:  Constitutional: Not in acute distress  Eyes: pupils bilaterally reactive to light. No icterus.  Oral cavity: Clear, no lesions  Neck: No neck vein distension noted  RS: Chest clear to auscultation bilaterally. No wheeze/rhonchi/crepitations.  CVS: S1, S2 heard. Regular rate and rhythm. No murmurs/rubs/gallops.  Abdomen: Soft. No guarding/rigidity/tenderness.  : No acute abnormalities  Extremities: Warm. No pedal edema  Skin: No lesions noted  Vascular: No evidence of phlebitis  Neuro: Alert, oriented to time/place/person                          7.5    18.34 )-----------( 339      ( 19 Aug 2020 07:04 )             24.2     08-19    140  |  103  |  10  ----------------------------<  115<H>  3.3<L>   |  23  |  0.77    Ca    8.8      19 Aug 2020 07:04  Phos  2.9     08-19  Mg     1.8     08-19      Urinalysis Basic - ( 18 Aug 2020 03:16 )    Color: Yellow / Appearance: Clear / S.027 / pH: x  Gluc: x / Ketone: Negative  / Bili: Negative / Urobili: <2 mg/dL   Blood: x / Protein: Trace / Nitrite: Negative   Leuk Esterase: Negative / RBC: x / WBC x   Sq Epi: x / Non Sq Epi: x / Bacteria: x    MICROBIOLOGY:  Culture - Urine (collected 18 Aug 2020 01:12)  Source: .Urine Clean Catch (Midstream)  Final Report (18 Aug 2020 20:57):    <10,000 CFU/mL Normal Urogenital Josefina    Culture - Blood (collected 18 Aug 2020 00:14)  Source: .Blood Blood  Preliminary Report (19 Aug 2020 01:02):    No growth to date.        RADIOLOGY:  imaging below personally reviewed Patient is a 87y old  Female who presents with a chief complaint of abdominal pain (19 Aug 2020 13:54)    HPI:  The patient is an 87-year-old female with past medical history of hypertension, dementia, left pubic ramus fracture, right sacral fracture, and right sided uteropelvic dilation who was sent to the hospital for recent decline in functional status. As per chart review, the patient had a vertebral compression fracture 1 year ago with no loss of function at that time. However one month ago, patient developed abdominal pain and decline in functional status.  She was evaluated at [facility] and diagnosed with sciatica and sent home. On review of imaging, she was found to have a left pubic ramus fracture, right sacral fracture, and and right uteropelvic dilation size of grapefruit due to obstruction with a right sided atrophic kidney. The patient was also noted to have decreased oral intake during this time. She was being treated for a urinary tract infection completed with oral ciprofloxacin. She was complaining of abdominal pain but denies fever, chills, chest pain, dizziness, palpitations, nausea, vomiting, diarrhea, or constipation. She has a home health aide that assists her with activities of daily living. History provided by daughter and HHA at bedside. Patient is a poor historian due to underlying dementia. Urine cultures from prior admission were growing pansensitive E. coli. On admission, the patient had a CT abdomen pelvis without contrast which showed right sided hydronephrosis with increased dilation of the right renal pelvis, increased since 2019 with a right cystic adenxal mass measuring 3.4 cm x 2.8 cm. CXR was negative. A discontinuity of the medial aspect of the right breast implant with trace pleural effusions were also noted. Urinalysis was negative. Urine cultures were negative. Blood cultures were no growth to date. COVID-19 PCR and Ab were negative. Interventional radiology was consulted for evaluation of drainage of the cystic mass. The patient is s/p IR-guided drainage of the right ureteropelvic junction with 1400 cc of pus filled fluid removed and placement of TRAE drain. ESR was found to be elevated. CBC showed leukocytosis. She was given intravenous ceftriaxone and intravenous piperacillin-tazobactam. Patient has Roth catheter in place draining yellow urine. As per urology, the patient is not a candidate for nephrectomy.    Allergies: NKDA  Surgical history: breast implant surgery  Social history: denies smoking, alcohol, or recreational drug use  Family history: none      prior hospital charts reviewed [x]  primary team notes reviewed [x]  other consultant notes reviewed [x]    PAST MEDICAL & SURGICAL HISTORY:  Dementia  HTN (hypertension)  History of hip surgery    Allergies  No Known Allergies    ANTIMICROBIALS (past 90 days)  MEDICATIONS  (STANDING):  cefTRIAXone   IVPB   100 mL/Hr IV Intermittent (20 @ 08:55)   100 mL/Hr IV Intermittent (20 @ 21:24)    piperacillin/tazobactam IVPB.   200 mL/Hr IV Intermittent (20 @ 13:28)    piperacillin/tazobactam IVPB..   25 mL/Hr IV Intermittent (20 @ 05:02)   25 mL/Hr IV Intermittent (20 @ 21:50)    piperacillin/tazobactam IVPB..   25 mL/Hr IV Intermittent (20 @ 14:17)      ANTIMICROBIALS:    piperacillin/tazobactam IVPB.. 3.375 every 8 hours    OTHER MEDS: MEDICATIONS  (STANDING):  acetaminophen   Tablet .. 650 every 6 hours PRN  bisacodyl Suppository 10 daily PRN  donepezil 10 at bedtime  escitalopram 5 daily  melatonin 3 at bedtime  morphine  - Injectable 1 every 4 hours PRN  senna 2 at bedtime    SOCIAL HISTORY:   hx smoking  non-smoker    FAMILY HISTORY:  FH: dementia    REVIEW OF SYSTEMS  [x] ROS unobtainable because: history of dementia   [  ] All other systems negative except as noted below:	    Constitutional:  [ ] fever [ ] chills  [ ] weight loss  [ ] weakness  Skin:  [ ] rash [ ] phlebitis	  Eyes: [ ] icterus [ ] pain  [ ] discharge	  ENMT: [ ] sore throat  [ ] thrush [ ] ulcers [ ] exudates  Respiratory: [ ] dyspnea [ ] hemoptysis [ ] cough [ ] sputum	  Cardiovascular:  [ ] chest pain [ ] palpitations [ ] edema	  Gastrointestinal:  [ ] nausea [ ] vomiting [ ] diarrhea [ ] constipation [ ] pain	  Genitourinary:  [ ] dysuria [ ] frequency [ ] hematuria [ ] discharge [ ] flank pain  [ ] incontinence  Musculoskeletal:  [ ] myalgias [ ] arthralgias [ ] arthritis  [ ] back pain  Neurological:  [ ] headache [ ] seizures  [ ] confusion/altered mental status  Psychiatric:  [ ] anxiety [ ] depression	  Hematology/Lymphatics:  [ ] lymphadenopathy  Endocrine:  [ ] adrenal [ ] thyroid  Allergic/Immunologic:	 [ ] transplant [ ] seasonal    Vital Signs Last 24 Hrs  T(F): 97.2 (20 @ 15:09), Max: 98.8 (20 @ 08:04)  Vital Signs Last 24 Hrs  HR: 94 (20 @ 15:09) (71 - 98)  BP: 133/74 (20 @ 15:09) (100/65 - 133/74)  RR: 20 (20 @ 15:09)  SpO2: 91% (20 @ 15:09) (88% - 100%)  Wt(kg): --    EXAM:  Constitutional: Not in acute distress  Eyes: No icterus.  Oral cavity: Clear, no lesions  Neck: No neck vein distension noted  RS: Chest clear to auscultation bilaterally. No wheeze/rhonchi/crepitations.  CVS: S1, S2 heard. Regular rate and rhythm. No murmurs/rubs/gallops.  Abdomen: Soft. No guarding/rigidity/tenderness, + right sided TRAE drain in place   : No acute abnormalities, + Roth catheter in place   Extremities: Warm. No pedal edema  Skin: No lesions noted  Vascular: No evidence of phlebitis  Neuro: Alert, oriented to time/place/person                          7.5    18.34 )-----------( 339      ( 19 Aug 2020 07:04 )             24.2     08-19    140  |  103  |  10  ----------------------------<  115<H>  3.3<L>   |  23  |  0.77    Ca    8.8      19 Aug 2020 07:04  Phos  2.9     08-19  Mg     1.8     -19      Urinalysis Basic - ( 18 Aug 2020 03:16 )    Color: Yellow / Appearance: Clear / S.027 / pH: x  Gluc: x / Ketone: Negative  / Bili: Negative / Urobili: <2 mg/dL   Blood: x / Protein: Trace / Nitrite: Negative   Leuk Esterase: Negative / RBC: x / WBC x   Sq Epi: x / Non Sq Epi: x / Bacteria: x    MICROBIOLOGY:  Culture - Urine (collected 18 Aug 2020 01:12)  Source: .Urine Clean Catch (Midstream)  Final Report (18 Aug 2020 20:57):    <10,000 CFU/mL Normal Urogenital Josefina    Culture - Blood (collected 18 Aug 2020 00:14)  Source: .Blood Blood  Preliminary Report (19 Aug 2020 01:02):    No growth to date.    RADIOLOGY:    CT Abdomen and Pelvis w/ IV Cont (20 @ 09:57) >  IMPRESSION:    Marked right hydronephrosis with dilatation of the right renal pelvis, significantly increased since 3/5/2019. There is associated right renal cortical atrophy. The level of the obstruction appears to be at the ureteropelvic junction. An underlying mass cannot be excluded.  Cystic right adnexal mass, unchanged.  Apparent medial rupture of the partially imaged right breast implant.      Xray Chest 1 View- PORTABLE-Routine (20 @ 18:07)    FINDINGS:      Lines and Tubes: None.  Lungs: The lungs are clear.  Pleura: No pleural effusion or pneumothorax.  Heart and Mediastinum: The heart is normal in size.  Skeletal: Breast implantation with calcification.    IMPRESSION:  1.  The lungs are clear. HPI:  The patient is an 87-year-old female with past medical history of hypertension, dementia, left pubic ramus fracture, right sacral fracture, and right sided uteropelvic dilation who was sent to the hospital for recent decline in functional status. As per chart review, the patient had a vertebral compression fracture 1 year ago with no loss of function at that time. However one month ago, patient developed abdominal pain and decline in functional status.  She was evaluated at [facility] and diagnosed with sciatica and sent home. On review of imaging, she was found to have a left pubic ramus fracture, right sacral fracture, and and right uteropelvic dilation size of grapefruit due to obstruction with a right sided atrophic kidney. The patient was also noted to have decreased oral intake during this time. She was being treated for a urinary tract infection completed with oral ciprofloxacin. She was complaining of abdominal pain but denies fever, chills, chest pain, dizziness, palpitations, nausea, vomiting, diarrhea, or constipation. She has a home health aide that assists her with activities of daily living. History provided by daughter and HHA at bedside. Patient is a poor historian due to underlying dementia. Urine cultures from prior admission were growing pansensitive E. coli. On admission, the patient had a CT abdomen pelvis without contrast which showed right sided hydronephrosis with increased dilation of the right renal pelvis, increased since 2019 with a right cystic adenxal mass measuring 3.4 cm x 2.8 cm. CXR was negative. A discontinuity of the medial aspect of the right breast implant with trace pleural effusions were also noted. Urinalysis was negative. Urine cultures were negative. Blood cultures were no growth to date. COVID-19 PCR and Ab were negative. Interventional radiology was consulted for evaluation of drainage of the cystic mass. The patient is s/p IR-guided right percutaneous nephrostomy with 1400 cc of pus filled fluid removed. ESR was found to be elevated. CBC showed leukocytosis. She was given intravenous ceftriaxone and intravenous piperacillin-tazobactam. Patient has Roth catheter in place draining yellow urine. As per urology, the patient is not a candidate for nephrectomy.    Allergies: NKDA  Surgical history: breast implant surgery  Social history: denies smoking, alcohol, or recreational drug use  Family history: none      prior hospital charts reviewed [x]  primary team notes reviewed [x]  other consultant notes reviewed [x]    PAST MEDICAL & SURGICAL HISTORY:  Dementia  HTN (hypertension)  History of hip surgery    Allergies  No Known Allergies    ANTIMICROBIALS (past 90 days)  MEDICATIONS  (STANDING):  cefTRIAXone   IVPB   100 mL/Hr IV Intermittent (20 @ 08:55)   100 mL/Hr IV Intermittent (20 @ 21:24)    piperacillin/tazobactam IVPB.   200 mL/Hr IV Intermittent (20 @ 13:28)    piperacillin/tazobactam IVPB..   25 mL/Hr IV Intermittent (20 @ 05:02)   25 mL/Hr IV Intermittent (20 @ 21:50)    piperacillin/tazobactam IVPB..   25 mL/Hr IV Intermittent (20 @ 14:17)      ANTIMICROBIALS:    piperacillin/tazobactam IVPB.. 3.375 every 8 hours    OTHER MEDS: MEDICATIONS  (STANDING):  acetaminophen   Tablet .. 650 every 6 hours PRN  bisacodyl Suppository 10 daily PRN  donepezil 10 at bedtime  escitalopram 5 daily  melatonin 3 at bedtime  morphine  - Injectable 1 every 4 hours PRN  senna 2 at bedtime    SOCIAL HISTORY:   hx smoking  non-smoker    FAMILY HISTORY:  FH: dementia    REVIEW OF SYSTEMS  [x] ROS unobtainable because: history of dementia   [  ] All other systems negative except as noted below:	    Constitutional:  [ ] fever [ ] chills  [ ] weight loss  [ ] weakness  Skin:  [ ] rash [ ] phlebitis	  Eyes: [ ] icterus [ ] pain  [ ] discharge	  ENMT: [ ] sore throat  [ ] thrush [ ] ulcers [ ] exudates  Respiratory: [ ] dyspnea [ ] hemoptysis [ ] cough [ ] sputum	  Cardiovascular:  [ ] chest pain [ ] palpitations [ ] edema	  Gastrointestinal:  [ ] nausea [ ] vomiting [ ] diarrhea [ ] constipation [ ] pain	  Genitourinary:  [ ] dysuria [ ] frequency [ ] hematuria [ ] discharge [ ] flank pain  [ ] incontinence  Musculoskeletal:  [ ] myalgias [ ] arthralgias [ ] arthritis  [ ] back pain  Neurological:  [ ] headache [ ] seizures  [ ] confusion/altered mental status  Psychiatric:  [ ] anxiety [ ] depression	  Hematology/Lymphatics:  [ ] lymphadenopathy  Endocrine:  [ ] adrenal [ ] thyroid  Allergic/Immunologic:	 [ ] transplant [ ] seasonal    Vital Signs Last 24 Hrs  T(F): 97.2 (08-19-20 @ 15:09), Max: 98.8 (20 @ 08:04)  Vital Signs Last 24 Hrs  HR: 94 (20 @ 15:09) (71 - 98)  BP: 133/74 (20 @ 15:09) (100/65 - 133/74)  RR: 20 (20 @ 15:09)  SpO2: 91% (20 @ 15:09) (88% - 100%)  Wt(kg): --    EXAM:  Constitutional: Not in acute distress  Eyes: No icterus.  Oral cavity: Clear, no lesions  Neck: No neck vein distension noted  RS: Chest clear to auscultation bilaterally. No wheeze/rhonchi/crepitations.  CVS: S1, S2 heard. Regular rate and rhythm. No murmurs/rubs/gallops.  Abdomen: Soft. No guarding/rigidity/tenderness  : No acute abnormalities, + Roth catheter in place, + right sided nephrostomy drain in place   Extremities: Warm. No pedal edema  Skin: No lesions noted  Vascular: No evidence of phlebitis  Neuro: Alert, oriented to time/place/person                          7.5    18.34 )-----------( 339      ( 19 Aug 2020 07:04 )             24.2     08-    140  |  103  |  10  ----------------------------<  115<H>  3.3<L>   |  23  |  0.77    Ca    8.8      19 Aug 2020 07:04  Phos  2.9     -  Mg     1.8     -19      Urinalysis Basic - ( 18 Aug 2020 03:16 )    Color: Yellow / Appearance: Clear / S.027 / pH: x  Gluc: x / Ketone: Negative  / Bili: Negative / Urobili: <2 mg/dL   Blood: x / Protein: Trace / Nitrite: Negative   Leuk Esterase: Negative / RBC: x / WBC x   Sq Epi: x / Non Sq Epi: x / Bacteria: x    MICROBIOLOGY:  Culture - Urine (collected 18 Aug 2020 01:12)  Source: .Urine Clean Catch (Midstream)  Final Report (18 Aug 2020 20:57):    <10,000 CFU/mL Normal Urogenital Josefina    Culture - Blood (collected 18 Aug 2020 00:14)  Source: .Blood Blood  Preliminary Report (19 Aug 2020 01:02):    No growth to date.    RADIOLOGY:  CT Abdomen and Pelvis w/ IV Cont (20 @ 09:57)   IMPRESSION:  Marked right hydronephrosis with dilatation of the right renal pelvis, significantly increased since 3/5/2019. There is associated right renal cortical atrophy. The level of the obstruction appears to be at the ureteropelvic junction. An underlying mass cannot be excluded.  Cystic right adnexal mass, unchanged.  Apparent medial rupture of the partially imaged right breast implant.      Xray Chest 1 View- PORTABLE-Routine (20 @ 18:07)  Lines and Tubes: None.  Lungs: The lungs are clear.  Pleura: No pleural effusion or pneumothorax.  Heart and Mediastinum: The heart is normal in size.  Skeletal: Breast implantation with calcification.  IMPRESSION:  1.  The lungs are clear.

## 2020-08-20 ENCOUNTER — TRANSCRIPTION ENCOUNTER (OUTPATIENT)
Age: 85
End: 2020-08-20

## 2020-08-20 LAB
ALBUMIN SERPL ELPH-MCNC: 2.2 G/DL — LOW (ref 3.3–5)
ALP SERPL-CCNC: 122 U/L — HIGH (ref 40–120)
ALT FLD-CCNC: 18 U/L — SIGNIFICANT CHANGE UP (ref 10–45)
ANION GAP SERPL CALC-SCNC: 12 MMOL/L — SIGNIFICANT CHANGE UP (ref 5–17)
AST SERPL-CCNC: 27 U/L — SIGNIFICANT CHANGE UP (ref 10–40)
BASOPHILS # BLD AUTO: 0.03 K/UL — SIGNIFICANT CHANGE UP (ref 0–0.2)
BASOPHILS NFR BLD AUTO: 0.2 % — SIGNIFICANT CHANGE UP (ref 0–2)
BILIRUB SERPL-MCNC: 0.5 MG/DL — SIGNIFICANT CHANGE UP (ref 0.2–1.2)
BLD GP AB SCN SERPL QL: NEGATIVE — SIGNIFICANT CHANGE UP
BUN SERPL-MCNC: 8 MG/DL — SIGNIFICANT CHANGE UP (ref 7–23)
CALCIUM SERPL-MCNC: 8.5 MG/DL — SIGNIFICANT CHANGE UP (ref 8.4–10.5)
CHLORIDE SERPL-SCNC: 99 MMOL/L — SIGNIFICANT CHANGE UP (ref 96–108)
CO2 SERPL-SCNC: 23 MMOL/L — SIGNIFICANT CHANGE UP (ref 22–31)
CREAT SERPL-MCNC: 0.88 MG/DL — SIGNIFICANT CHANGE UP (ref 0.5–1.3)
EOSINOPHIL # BLD AUTO: 0.24 K/UL — SIGNIFICANT CHANGE UP (ref 0–0.5)
EOSINOPHIL NFR BLD AUTO: 1.7 % — SIGNIFICANT CHANGE UP (ref 0–6)
GLUCOSE SERPL-MCNC: 91 MG/DL — SIGNIFICANT CHANGE UP (ref 70–99)
HCT VFR BLD CALC: 23.1 % — LOW (ref 34.5–45)
HCT VFR BLD CALC: 25.3 % — LOW (ref 34.5–45)
HGB BLD-MCNC: 7.1 G/DL — LOW (ref 11.5–15.5)
HGB BLD-MCNC: 7.7 G/DL — LOW (ref 11.5–15.5)
IMM GRANULOCYTES NFR BLD AUTO: 2.5 % — HIGH (ref 0–1.5)
LYMPHOCYTES # BLD AUTO: 22.1 % — SIGNIFICANT CHANGE UP (ref 13–44)
LYMPHOCYTES # BLD AUTO: 3.04 K/UL — SIGNIFICANT CHANGE UP (ref 1–3.3)
MCHC RBC-ENTMCNC: 28.2 PG — SIGNIFICANT CHANGE UP (ref 27–34)
MCHC RBC-ENTMCNC: 28.3 PG — SIGNIFICANT CHANGE UP (ref 27–34)
MCHC RBC-ENTMCNC: 30.4 GM/DL — LOW (ref 32–36)
MCHC RBC-ENTMCNC: 30.7 GM/DL — LOW (ref 32–36)
MCV RBC AUTO: 92 FL — SIGNIFICANT CHANGE UP (ref 80–100)
MCV RBC AUTO: 92.7 FL — SIGNIFICANT CHANGE UP (ref 80–100)
MONOCYTES # BLD AUTO: 0.89 K/UL — SIGNIFICANT CHANGE UP (ref 0–0.9)
MONOCYTES NFR BLD AUTO: 6.5 % — SIGNIFICANT CHANGE UP (ref 2–14)
NEUTROPHILS # BLD AUTO: 9.23 K/UL — HIGH (ref 1.8–7.4)
NEUTROPHILS NFR BLD AUTO: 67 % — SIGNIFICANT CHANGE UP (ref 43–77)
NRBC # BLD: 0 /100 WBCS — SIGNIFICANT CHANGE UP (ref 0–0)
NRBC # BLD: 0 /100 WBCS — SIGNIFICANT CHANGE UP (ref 0–0)
PLATELET # BLD AUTO: 286 K/UL — SIGNIFICANT CHANGE UP (ref 150–400)
PLATELET # BLD AUTO: 291 K/UL — SIGNIFICANT CHANGE UP (ref 150–400)
POTASSIUM SERPL-MCNC: 3 MMOL/L — LOW (ref 3.5–5.3)
POTASSIUM SERPL-SCNC: 3 MMOL/L — LOW (ref 3.5–5.3)
PROT SERPL-MCNC: 5.4 G/DL — LOW (ref 6–8.3)
RBC # BLD: 2.51 M/UL — LOW (ref 3.8–5.2)
RBC # BLD: 2.73 M/UL — LOW (ref 3.8–5.2)
RBC # FLD: 15.9 % — HIGH (ref 10.3–14.5)
RBC # FLD: 15.9 % — HIGH (ref 10.3–14.5)
RH IG SCN BLD-IMP: POSITIVE — SIGNIFICANT CHANGE UP
SODIUM SERPL-SCNC: 134 MMOL/L — LOW (ref 135–145)
WBC # BLD: 13.78 K/UL — HIGH (ref 3.8–10.5)
WBC # BLD: 13.79 K/UL — HIGH (ref 3.8–10.5)
WBC # FLD AUTO: 13.78 K/UL — HIGH (ref 3.8–10.5)
WBC # FLD AUTO: 13.79 K/UL — HIGH (ref 3.8–10.5)

## 2020-08-20 PROCEDURE — 99232 SBSQ HOSP IP/OBS MODERATE 35: CPT

## 2020-08-20 PROCEDURE — 99232 SBSQ HOSP IP/OBS MODERATE 35: CPT | Mod: GC

## 2020-08-20 PROCEDURE — 99231 SBSQ HOSP IP/OBS SF/LOW 25: CPT

## 2020-08-20 RX ORDER — POTASSIUM CHLORIDE 20 MEQ
40 PACKET (EA) ORAL EVERY 4 HOURS
Refills: 0 | Status: COMPLETED | OUTPATIENT
Start: 2020-08-20 | End: 2020-08-20

## 2020-08-20 RX ORDER — FERROUS SULFATE 325(65) MG
325 TABLET ORAL DAILY
Refills: 0 | Status: DISCONTINUED | OUTPATIENT
Start: 2020-08-20 | End: 2020-08-24

## 2020-08-20 RX ADMIN — HEPARIN SODIUM 5000 UNIT(S): 5000 INJECTION INTRAVENOUS; SUBCUTANEOUS at 08:46

## 2020-08-20 RX ADMIN — Medication 325 MILLIGRAM(S): at 11:04

## 2020-08-20 RX ADMIN — ESCITALOPRAM OXALATE 5 MILLIGRAM(S): 10 TABLET, FILM COATED ORAL at 11:03

## 2020-08-20 RX ADMIN — PIPERACILLIN AND TAZOBACTAM 25 GRAM(S): 4; .5 INJECTION, POWDER, LYOPHILIZED, FOR SOLUTION INTRAVENOUS at 13:20

## 2020-08-20 RX ADMIN — DONEPEZIL HYDROCHLORIDE 10 MILLIGRAM(S): 10 TABLET, FILM COATED ORAL at 22:59

## 2020-08-20 RX ADMIN — Medication 3 MILLIGRAM(S): at 22:59

## 2020-08-20 RX ADMIN — SENNA PLUS 2 TABLET(S): 8.6 TABLET ORAL at 22:58

## 2020-08-20 RX ADMIN — HEPARIN SODIUM 5000 UNIT(S): 5000 INJECTION INTRAVENOUS; SUBCUTANEOUS at 16:55

## 2020-08-20 RX ADMIN — Medication 40 MILLIEQUIVALENT(S): at 10:56

## 2020-08-20 RX ADMIN — PIPERACILLIN AND TAZOBACTAM 25 GRAM(S): 4; .5 INJECTION, POWDER, LYOPHILIZED, FOR SOLUTION INTRAVENOUS at 22:58

## 2020-08-20 RX ADMIN — Medication 40 MILLIEQUIVALENT(S): at 13:20

## 2020-08-20 RX ADMIN — PIPERACILLIN AND TAZOBACTAM 25 GRAM(S): 4; .5 INJECTION, POWDER, LYOPHILIZED, FOR SOLUTION INTRAVENOUS at 06:26

## 2020-08-20 NOTE — DISCHARGE NOTE PROVIDER - HOSPITAL COURSE
This is a 87-year-old female with HTN, dementia (AOx1 baseline, ambulates with cane), recent hospitalization at [place] found to have L pubic ramus fracture, R sacral fracture, and R UPJ dilation size of grapefruit due to obstruction with R kidney chronically shriveled, recent UTI, sent by. Dr. Hood for one month history of abdominal pain and recent decline in functional status.         Pt found to meet SIRS criteria (HR >90 and leukocytosis to 17.7), admitted for management of sepsis and pain control. Family interested in more conservative management but wanted to address any reversible causes of pain. Patient treated with Tylenol PRN for mild pain and Morphine 1 mg q4 PRN for moderate-severe pain.         Infectious workup done: U/A and urine culture negative, COVID negative, CXR clear lungs, blood cxs no growth. CT abdomen here showed further increased right renal collecting system and right renal pelvis size. Roth placed due to urinary retention. Started on ceftriaxone, however worsening leukocytosis and thus broadened to zosyn. Urology consulted, pt not a candidate for nephrectomy. Seen by IR who placed a R nephrostomy tube with 1400 cc purulent drainage from R kidney mass. Urine culture from IR procedure sent, results pending +++. ID was consulted for antibiotic duration and recommended +++         Course c/b anemia (baseline had been normal) w/ slowly downtrending Hb, MCV normocytic. Iron studies showed low TIBC, low iron, elevated ferritin. No evidence of bleeding. Folate, B12, and TSH WNL. This was likely multifactorial in the setting of inflammation and dilution from IV fluids, additionally an element of iron deficiency. Pt started on ferrous sulfate 325 daily.         PT evaluated the pt and recommended +++ This is a 87-year-old female with HTN, dementia (AOx1 baseline, ambulates with cane), recent hospitalization at [place] found to have L pubic ramus fracture, R sacral fracture, and R UPJ dilation size of grapefruit due to obstruction with R kidney chronically shriveled, recent UTI, sent by. Dr. Hood for one month history of abdominal pain and recent decline in functional status.         Pt found to meet SIRS criteria (HR >90 and leukocytosis to 17.7), admitted for management of sepsis and pain control. Family interested in more conservative management but wanted to address any reversible causes of pain. Patient treated with Tylenol PRN for mild pain and Morphine 1 mg q4 PRN for moderate-severe pain.         Infectious workup done: U/A and urine culture negative, COVID negative, CXR clear lungs, blood cxs no growth. CT abdomen here showed further increased right renal collecting system and right renal pelvis size. Roth placed due to urinary retention. Started on ceftriaxone, however worsening leukocytosis and thus broadened to zosyn. Urology consulted, pt not a candidate for nephrectomy. Seen by IR who placed a R nephrostomy tube with 1400 cc purulent drainage from R kidney mass. Urine culture from IR procedure sent, results positive for E. coli. ID was consulted for antibiotic duration and recommended narrowing to Rocephin and thereafter transitioned to PO Bactrim for a 7 day course after drain placement. Per ID, unclear benefit of suppressive prophylactic antibiotics but can use Bactrim half of a SS tab daily after completion of 7 day course. As nephrostomy tube remains with drainage, it was recommended to keep tube in place upon discharge. Patient to follow up with Dr. Olmstead as outpatient and have routine nursing care to maintain nephrostomy tube.             Course c/b anemia (baseline had been normal) w/ slowly downtrending Hb, MCV normocytic. Iron studies showed low TIBC, low iron, elevated ferritin. No evidence of bleeding. Folate, B12, and TSH WNL. FOBT positive. This was likely multifactorial in the setting of inflammation and dilution from IV fluids, additionally an element of iron deficiency. Pt started on ferrous sulfate 325 daily. Patient's family not pursuing further workup with colonoscopy. Patient's hemoglobin steadily increased and last hgb (8/23) was 8.4.         Repeat CT A/P  on 8/21/20 showed severely hydronephrotic right kidney with decreased dilatation after placement of right percutaneous gastrostomy tube. Small new hyperdense material around the catheter, probably small amount of hemorrhage. Diffuse urothelial enhancement is present, suspicious for superimposed infection. Abnormal enhancing urothelial tissue at the ureteropelvic junction, possibly neoplasm.        Home hospice referral made as patient with urothelial tissue enhancement may represent malignancy and family did not want to pursue further workup. Patient medically stable for discharge home.

## 2020-08-20 NOTE — PROGRESS NOTE ADULT - ASSESSMENT
87F with dementia admitted 8/17/20 for functional decline.   Known chronic right atrophic kidney with hydroureteronephrosis but now with markedly more dilated.   Aside from leukocytosis there hasn't been much to suggest infection but IR placed a nephrostomy and drained 1.4L of purulent urine. E. coli growing so far. E. coli also grew from 7/5/20, only R to Amp/Unasyn.   Doing well, afebrile, cheerful.     Suggest  -continue empiric Zosyn   -f/u E. coli sensitivities   -anticipate about 7-10 days of antibiotics, PO if cultures permitting     Julio Rea MD   Infectious Disease   Pager [phone]   After 5PM and on weekends please page fellow on call or call [phone]

## 2020-08-20 NOTE — PROGRESS NOTE ADULT - PROBLEM SELECTOR PLAN 8
Diet: soft  DVT PPX: heparin SQ   GOC: DNR/DNI   Dispo: pending clinical improvement Diet: soft, will add ensure, nutrition consult   DVT PPX: heparin SQ   GOC: DNR/DNI   Dispo: pending clinical improvement

## 2020-08-20 NOTE — PROGRESS NOTE ADULT - PROBLEM SELECTOR PLAN 1
- Continues to meet SIRS criteria HR 94, leukocytosis to 13.8, mildly elevated procalcitonin 0.29. Right-sided abdominal pain in the setting of R UPJ dilation  - s/p R nephrostomy tube with 1400 cc purulent drainage from R kidney mass 8/19   - Follow up urine culture from IR procedure, results pending   - U/A and urine culture negative, CXR clear lungs, COVID negative    - On Zosyn 3.375 mg q8 (8/18 - ).  -Continue IVF  - ID consulted, recs appreciated   - Discussion with daughterDiamond at bedside regarding potential need for MICU if patient were to require pressors for hypotension. She will discuss with siblings but it seems like family leaning toward staying in PCU and does not want to pursue aggressive treatment - Continues to meet SIRS criteria HR 94, leukocytosis to 13.8, mildly elevated procalcitonin 0.29. Right-sided abdominal pain in the setting of R UPJ dilation  - s/p R nephrostomy tube with 1400 cc purulent drainage from R kidney mass 8/19   - Follow up urine culture from IR procedure, results pending   - U/A and urine culture negative, CXR clear lungs, COVID negative    - On Zosyn 3.375 mg q8 (8/18 - ).  - S/p IVF  - ID consulted, recs appreciated   - Discussion with daughter, Diamond at bedside regarding potential need for MICU if patient were to require pressors for hypotension. She will discuss with siblings but it seems like family leaning toward staying in PCU and does not want to pursue aggressive treatment

## 2020-08-20 NOTE — PROGRESS NOTE ADULT - SUBJECTIVE AND OBJECTIVE BOX
Follow Up: UTI     Interval History/ROS: Feels well. Afebrile. No significant pain. No cough or dyspnea.     Allergies  No Known Allergies        ANTIMICROBIALS:  piperacillin/tazobactam IVPB.. 3.375 every 8 hours      OTHER MEDS:  acetaminophen   Tablet .. 650 milliGRAM(s) Oral every 6 hours PRN  bisacodyl Suppository 10 milliGRAM(s) Rectal daily PRN  donepezil 10 milliGRAM(s) Oral at bedtime  escitalopram 5 milliGRAM(s) Oral daily  ferrous    sulfate 325 milliGRAM(s) Oral daily  heparin   Injectable 5000 Unit(s) SubCutaneous every 8 hours  melatonin 3 milliGRAM(s) Oral at bedtime  morphine  - Injectable 1 milliGRAM(s) IV Push every 4 hours PRN  senna 2 Tablet(s) Oral at bedtime      Vital Signs Last 24 Hrs  T(C): 36.2 (20 Aug 2020 09:13), Max: 36.2 (20 Aug 2020 09:13)  T(F): 97.1 (20 Aug 2020 09:13), Max: 97.1 (20 Aug 2020 09:13)  HR: 79 (20 Aug 2020 09:13) (71 - 79)  BP: 107/64 (20 Aug 2020 09:13) (107/64 - 114/58)  BP(mean): --  RR: 18 (20 Aug 2020 09:13) (18 - 18)  SpO2: 93% (20 Aug 2020 09:13) (93% - 94%)    Physical Exam:   General: awake, alert, non toxic  Head: atraumatic, normocephalic  Eye: normal sclera and conjunctiva  Cardio: regular rate   Respiratory: nonlabored on room air  abd: soft, no tenderness  : right nephrostomy, no suprapubic tenderness   psych: smiling, normal affect                          7.7    13.79 )-----------( 291      ( 20 Aug 2020 14:35 )             25.3       08-20    134<L>  |  99  |  8   ----------------------------<  91  3.0<L>   |  23  |  0.88    Ca    8.5      20 Aug 2020 06:35  Phos  2.9     08-19  Mg     1.8     08-19    TPro  5.4<L>  /  Alb  2.2<L>  /  TBili  0.5  /  DBili  x   /  AST  27  /  ALT  18  /  AlkPhos  122<H>  08-20          MICROBIOLOGY:  Culture - Urine (collected 08-19-20 @ 22:05)  Source: .Urine Nephrostomy - Right  Preliminary Report (08-20-20 @ 17:32):    Moderate Escherichia coli    Culture - Urine (collected 08-18-20 @ 01:12)  Source: .Urine Clean Catch (Midstream)  Final Report (08-18-20 @ 20:57):    <10,000 CFU/mL Normal Urogenital Josefina    Culture - Blood (collected 08-18-20 @ 00:14)  Source: .Blood Blood  Preliminary Report (08-19-20 @ 01:02):    No growth to date.    RADIOLOGY:  IR Procedure (08.19.20 @ 14:00)   Successful placement of a right 8.5 Frenchpercutaneous nephrostomy tube using ultrasound and fluoroscopic guidance as above. Pyonephrosis. The palliative care fellow was informed of the findings immediately following the procedure.    Images below reviewed personally  CT Abdomen and Pelvis w/ IV Cont (08.18.20 @ 09:57)   Marked right hydronephrosis with dilatation of the right renal pelvis, significantly increased since 3/5/2019. There is associated right renal cortical atrophy. The level of the obstruction appears to be at the ureteropelvic junction. An underlying mass cannot be excluded.  Cystic right adnexal mass, unchanged.  Apparent medial rupture of the partially imaged right breast implant.

## 2020-08-20 NOTE — DISCHARGE NOTE PROVIDER - NSDCCPCAREPLAN_GEN_ALL_CORE_FT
PRINCIPAL DISCHARGE DIAGNOSIS  Diagnosis: Sepsis, due to unspecified organism, unspecified whether acute organ dysfunction present  Assessment and Plan of Treatment: You came in with abdominal pain and were found to have an elevated white blood cell count. We did an infectious workup - urinalysis was normal, COVID was negative, and chest x-ray showed clear lungs. A CAT scan of the abdomen showed a dilation of the collecting system from the kidneys with obstruction not allowing you to urinate. You were seen by Urology. Additionally you were seen by Interventional Radiology who placed a nephrostomy tube to help relieve the obstruction. They also drained purulent fluid from a right kidney mass. You were seen by Infectious Disease and treated with the antibiotic zosyn. We sent studies from your nephrostomy tube which showed +++. We treated you with Tylenol and Morphine for pain.      SECONDARY DISCHARGE DIAGNOSES  Diagnosis: Urinary retention  Assessment and Plan of Treatment: A martinez was placed in order to drain urine due to the obstruction of your kidney system. Please follow up with your PCP.    Diagnosis: Anemia, unspecified type  Assessment and Plan of Treatment: You were found to have low blood counts. You had no evidence of bleeding. We did iron studies which showed low iron. We also checked B12, folate, and thyroid studies, which were normal. We started you on iron. We monitored your blood counts closely. Please follow up with your PCP. PRINCIPAL DISCHARGE DIAGNOSIS  Diagnosis: Sepsis, due to unspecified organism, unspecified whether acute organ dysfunction present  Assessment and Plan of Treatment: You came in with abdominal pain and were found to have an elevated white blood cell count. We did an infectious workup - urinalysis was normal, COVID was negative, and chest x-ray showed clear lungs. A CAT scan of the abdomen showed a dilation of the collecting system from the kidneys with obstruction not allowing you to urinate. You were seen by Urology. Additionally you were seen by Interventional Radiology who placed a nephrostomy tube to help relieve the obstruction. They also drained purulent fluid from a right kidney mass. You were seen by Infectious Disease and treated with the antibiotic zosyn. We sent studies from your nephrostomy tube which showed E. coli. We treated you with Tylenol and Morphine for pain. You will finish Bactrim 1 tab two times a day until 8/26/2020. The nephrostomy tube will stay in place upon discharge. Follow up with Interventional Radiology, Dr. Roberth Olmstead, for routine exchange of your right nephrostomy tube every 3 months.  Call IR at [phone] to schedule the procedure.      SECONDARY DISCHARGE DIAGNOSES  Diagnosis: Urinary retention  Assessment and Plan of Treatment: A martinez was placed in order to drain urine due to the obstruction of your kidney system. Please follow up with your PCP.    Diagnosis: Hypertension, unspecified type  Assessment and Plan of Treatment: You may continue Amlodipine 10mg daily for elevated blood pressure.    Diagnosis: Anemia, unspecified type  Assessment and Plan of Treatment: You were found to have low blood counts. You had no evidence of bleeding. We did iron studies which showed low iron. We also checked B12, folate, and thyroid studies, which were normal. We started you on iron. We monitored your blood counts closely. Please follow up with your PCP.    Diagnosis: Osteoarthritis  Assessment and Plan of Treatment: You may continue taking Tylenol as prescribed by your PCP. PRINCIPAL DISCHARGE DIAGNOSIS  Diagnosis: Sepsis, due to unspecified organism, unspecified whether acute organ dysfunction present  Assessment and Plan of Treatment: You came in with abdominal pain and were found to have an elevated white blood cell count. We did an infectious workup - urinalysis was normal, COVID was negative, and chest x-ray showed clear lungs. A CAT scan of the abdomen showed a dilation of the collecting system from the kidneys with obstruction not allowing you to urinate. You were seen by Urology. Additionally you were seen by Interventional Radiology who placed a nephrostomy tube to help relieve the obstruction. They also drained purulent fluid from a right kidney mass. You were seen by Infectious Disease and treated with the antibiotic zosyn. We sent studies from your nephrostomy tube which showed E. coli. We treated you with Tylenol and Morphine for pain. You will finish Bactrim 1 tab two times a day until 8/26/2020. The nephrostomy tube will stay in place upon discharge. Follow up with Interventional Radiology, Dr. Roberth Olmstead, for routine exchange of your right nephrostomy tube every 3 months.  Call IR at [phone] to schedule the procedure.      SECONDARY DISCHARGE DIAGNOSES  Diagnosis: Urinary retention  Assessment and Plan of Treatment: A martinez was placed in order to drain urine due to the obstruction of your kidney system. You passed a trial and void. Please follow up with your PCP.    Diagnosis: Hypertension, unspecified type  Assessment and Plan of Treatment: You may continue Amlodipine 10mg daily for elevated blood pressure.    Diagnosis: Anemia, unspecified type  Assessment and Plan of Treatment: You were found to have low blood counts. You had no evidence of bleeding. We did iron studies which showed low iron. We also checked B12, folate, and thyroid studies, which were normal. We started you on iron. We monitored your blood counts closely. Please follow up with your PCP.    Diagnosis: Osteoarthritis  Assessment and Plan of Treatment: You may continue taking Tylenol as prescribed by your PCP.

## 2020-08-20 NOTE — DISCHARGE NOTE PROVIDER - NSDCCPTREATMENT_GEN_ALL_CORE_FT
PRINCIPAL PROCEDURE  Procedure: Percutaneous right nephrostomy  Findings and Treatment: You had a Right nephrostomy tube placed by Dr. Roberth Olmstead on 8/19/2020 in Interventional Radiology (IR).   Monitor site for any sign or symptoms of infection (painful red skin, green/ yellow foul smelling discharge from the insertion site, fever, chills, leakage around drain site).   Forward flush drain with 5 mL sterile normal saline daily. If you meet resistance upon flushing, STOP and contact IR.   Empty drainage bag or bulb daily and record output.   Please contact Interventional Radiology for routine exchange of the Right nephrostomy tube every 3 months.  Drain care:  -Disconnect tubing (tube attached to bag/ bulb)  from the catheter (catheter going into skin)  -Clean catheter with alcohol swab  -Twist on the flush syringe to the catheter (be sure to push the air out of syringe)  -Flush catheter with 5rmL (DO NOT pull back on syringe). If you meet resistance upon flushing, STOP and contact IR.   -Disconnect syringe from catheter and reconnect drainage bag or bulb.  Dressing care:  -Wash hands thoroughly with water and soap for at least 20 seconds.   -take off old dressing and clean around drain site with gauze soaked with warm water  -After cleaning the site, dry and replace dressing with a new gauze and tegaderm.   -Place one piece of gauze underneath the drain and another piece of gauze on top of drain.   -Apply tegaderm (clear dressing) on top.  -Change dressing every 3 days or when soiled

## 2020-08-20 NOTE — DISCHARGE NOTE PROVIDER - CARE PROVIDER_API CALL
Lynda Hood  GERIATRIC MEDICINE  [address] [place] [address], [address]  [place] [address]  Phone: [phone]  Fax: [phone]  Established Patient  Follow Up Time: 1 week Lynda Hood  GERIATRIC MEDICINE  [address] [facility], [address]  [place] [address]  Phone: [phone]  Fax: [phone]  Established Patient  Follow Up Time: 1 week    Roberth Olmstead  INTERVENTIONAL RADIOLOGY AND DIAGNOSTIC RADIOLOGY  [address] [place] [address], [place] [address]  Phone: [phone]  Fax: [phone]  Follow Up Time:

## 2020-08-20 NOTE — DISCHARGE NOTE PROVIDER - NSDCMRMEDTOKEN_GEN_ALL_CORE_FT
amLODIPine 10 mg oral tablet: 1 tab(s) orally once a day  donepezil 10 mg oral tablet: 1 tab(s) orally once a day (at bedtime)  Lexapro 5 mg oral tablet: 1 tab(s) orally once a day  oxyCODONE 5 mg oral capsule: 1 cap(s) orally every 8 hours MDD:3 acetaminophen 325 mg oral tablet: 3 tab(s) orally every 8 hours  amLODIPine 10 mg oral tablet: 1 tab(s) orally once a day  donepezil 10 mg oral tablet: 1 tab(s) orally once a day (at bedtime)  ferrous sulfate 325 mg (65 mg elemental iron) oral tablet: 1 tab(s) orally once a day  Lexapro 5 mg oral tablet: 1 tab(s) orally once a day  oxyCODONE 5 mg oral capsule: 1 cap(s) orally every 8 hours MDD:3 acetaminophen 325 mg oral tablet: 3 tab(s) orally every 8 hours  amLODIPine 10 mg oral tablet: 1 tab(s) orally once a day  donepezil 10 mg oral tablet: 1 tab(s) orally once a day (at bedtime)  ferrous sulfate 325 mg (65 mg elemental iron) oral tablet: 1 tab(s) orally once a day  Lexapro 5 mg oral tablet: 1 tab(s) orally once a day  oxyCODONE 5 mg oral capsule: 1 cap(s) orally every 8 hours MDD:3  sulfamethoxazole-trimethoprim 400 mg-80 mg oral tablet: 1 tab(s) orally 2 times a day until 8/26/2020

## 2020-08-20 NOTE — PROGRESS NOTE ADULT - PROBLEM SELECTOR PLAN 4
- Hb 7.1 MCV 92 today. Hb downtrending further from 7.5 yesterday. Likely multifactorial anemia due to inflammation and dilution from IV fluids. Iron studies show decreased TIBC, low iron. Will start ferrous sulfate 325 daily    - No evidence of bleeding, but will assess guaiac.   - Monitor CBC q12   - Folate, B12, TSH WNL   - Maintain active T&S

## 2020-08-20 NOTE — PROGRESS NOTE ADULT - PROBLEM SELECTOR PLAN 3
- Likely in the setting of R UPJ dilation due to obstruction w/ R kidney chronically shriveled. Dilation worsening per CT 8/18/20.  - martinez catheter in place

## 2020-08-20 NOTE — PROGRESS NOTE ADULT - PROBLEM SELECTOR PLAN 2
- Pt w/ recent imaging at [place] found to have L pubic ramus fracture, R sacral fracture, and R UPJ dilation size of grapefruit due to obstruction with R kidney chronically shriveled.  Dr. Hood spoke to Urology, no surgical intervention currently being planned due to shriveled kidney state   - CT 8/18/20 here showed further increased right renal collecting system and right renal pelvis size  - Urology consulted, pt not a candidate for nephrectomy.   - s/p R nephrostomy tube by IR 8/19    - Family interested in more conservative management but would like to address any reversible causes of pain  - Continue Tylenol PRN for mild pain, Morphine 1 mg q4 PRN for moderate-severe pain

## 2020-08-20 NOTE — DISCHARGE NOTE PROVIDER - CARE PROVIDERS DIRECT ADDRESSES
[email or web address]

## 2020-08-20 NOTE — DISCHARGE NOTE PROVIDER - PROVIDER TOKENS
PROVIDER:[TOKEN:[[id number]],FOLLOWUP:[1 week],ESTABLISHEDPATIENT:[T]] PROVIDER:[TOKEN:[[id number]],FOLLOWUP:[1 week],ESTABLISHEDPATIENT:[T]],PROVIDER:[TOKEN:[[id number]]]

## 2020-08-20 NOTE — DISCHARGE NOTE PROVIDER - NSDCFUADDAPPT_GEN_ALL_CORE_FT
Follow up with Interventional Radiology, Dr. Roberth Olmstead, for routine exchange of your right nephrostomy tube every 3 months.  Call IR at [phone] to schedule the procedure.

## 2020-08-20 NOTE — PROGRESS NOTE ADULT - SUBJECTIVE AND OBJECTIVE BOX
Interventional Radiology Follow- Up Note      87y Female s/p right PCN on 8/19/20 in Interventional Radiology with Dr Olmstead.     Patient seen and examined @ bedside with daughter, Diamond.  No complaints offered.    Vitals: T(F): 97.1 (08-20-20 @ 09:13), Max: 97.1 (08-20-20 @ 09:13)  HR: 79 (08-20-20 @ 09:13) (71 - 79)  BP: 107/64 (08-20-20 @ 09:13) (107/64 - 114/58)  RR: 18 (08-20-20 @ 09:13) (18 - 18)  SpO2: 93% (08-20-20 @ 09:13) (93% - 94%)  Wt(kg): --    LABS:                        7.7    13.79 )-----------( 291      ( 20 Aug 2020 14:35 )             25.3     08-20    134<L>  |  99  |  8   ----------------------------<  91  3.0<L>   |  23  |  0.88    Ca    8.5      20 Aug 2020 06:35  Phos  2.9     08-19  Mg     1.8     08-19    TPro  5.4<L>  /  Alb  2.2<L>  /  TBili  0.5  /  DBili  x   /  AST  27  /  ALT  18  /  AlkPhos  122<H>  08-20    PT/INR - ( 19 Aug 2020 09:20 )   PT: 14.9 sec;   INR: 1.28 ratio         PTT - ( 19 Aug 2020 09:20 )  PTT:28.3 sec  I&O's Detail    19 Aug 2020 07:01  -  20 Aug 2020 07:00  --------------------------------------------------------  IN:    IV PiggyBack: 25 mL  Total IN: 25 mL    OUT:  Total OUT: 0 mL    Total NET: 25 mL      PHYSICAL EXAM:  General: Nontoxic, in NAD  Neuro:  Alert & oriented x 3  Lung:  respirations nonlabored, good inspiratory effort  : right PCN to bsd with moderate amount of purulent drainage, dsg c/d/i  Extremities: no pedal edema or calf tenderness noted         Impression: This is a 87-year-old female with HTN, dementia, recent hospitalization at [place] found to have L pubic ramus fracture, R sacral fracture, and R UPJ dilation, recent UTI, presenting with abdominal pain and decline in functional status over the last month. Found to have leukocytosis 17.7. Pt admitted for management of pain and sepsis s/p IR drainage of 1.4 L of pus from dilated ureter.       Plan:  -continue to monitor output    -Flush drain per doctor orders 5 ml NS daily  -trend vitals, labs  - continue Zosyn, f/u final cx  -discussed f/u care with family briefly and how she will require routine PCN exchanges q 3months, contact information given  -will discuss with IR attending     Please call IR at extension [phone] with any questions, concerns, or issues regarding above.

## 2020-08-20 NOTE — PROGRESS NOTE ADULT - SUBJECTIVE AND OBJECTIVE BOX
GAP TEAM PALLIATIVE CARE UNIT PROGRESS NOTE:      [] Patient on hospice program.    INDICATION FOR PALLIATIVE CARE UNIT SERVICES: Symptom Control, Comfort Measures, GOC    INTERVAL HPI/OVERNIGHT EVENTS: No acute events overnight. Pt feels better and was able to have a normal BM. Received 2 mg Morphine over the past 24 hours for pain.     DNR on chart: Yes  Yes    Allergies    No Known Allergies    Intolerances    MEDICATIONS  (STANDING):  donepezil 10 milliGRAM(s) Oral at bedtime  escitalopram 5 milliGRAM(s) Oral daily  ferrous    sulfate 325 milliGRAM(s) Oral daily  heparin   Injectable 5000 Unit(s) SubCutaneous every 8 hours  lactated ringers. 1000 milliLiter(s) (100 mL/Hr) IV Continuous <Continuous>  melatonin 3 milliGRAM(s) Oral at bedtime  piperacillin/tazobactam IVPB.. 3.375 Gram(s) IV Intermittent every 8 hours  potassium chloride   Powder 40 milliEquivalent(s) Oral every 4 hours  senna 2 Tablet(s) Oral at bedtime    MEDICATIONS  (PRN):  acetaminophen   Tablet .. 650 milliGRAM(s) Oral every 6 hours PRN Mild Pain (1 - 3)  bisacodyl Suppository 10 milliGRAM(s) Rectal daily PRN Constipation  morphine  - Injectable 1 milliGRAM(s) IV Push every 4 hours PRN moderate to severe pain    ITEMS UNCHECKED ARE NOT PRESENT    PRESENT SYMPTOMS: [x]Unable to obtain due to poor mentation   Source if other than patient:  [x]Family   [x]Team     Pain: [X] yes [] no  QOL impact - worsening functional status over the last month  Location - right side abdomen                    Aggravating factors - none  Quality - unable to assess  Radiation - none  Timing - 1 month  Severity (0-10 scale): unable to assess  Minimal acceptable level (0-10 scale): unable to assess     Dyspnea:                           []Mild []Moderate []Severe  Anxiety:                             []Mild []Moderate []Severe  Fatigue:                             []Mild []Moderate []Severe  Nausea:                             []Mild []Moderate []Severe  Loss of appetite:              []Mild []Moderate []Severe  Constipation:                    []Mild []Moderate []Severe    PAINAD Score:  http://geriatrictoolkit.[facility]/cog/painad.pdf  		  Other Symptoms:  [x]All other review of systems negative     Karnofsky Performance Score/Palliative Performance Status Version 2:         %  http://npcrc.org/files/news/palliative_performance_scale_ppsv2.pdf    PHYSICAL EXAM:  Vital Signs Last 24 Hrs  T(C): 36.1 (20 Aug 2020 01:10), Max: 36.7 (19 Aug 2020 12:07)  T(F): 96.9 (20 Aug 2020 01:10), Max: 98.1 (19 Aug 2020 12:07)  HR: 71 (20 Aug 2020 01:10) (71 - 94)  BP: 114/58 (20 Aug 2020 01:10) (100/65 - 133/74)  BP(mean): 73 (19 Aug 2020 14:30) (64 - 77)  RR: 18 (20 Aug 2020 01:10) (16 - 22)  SpO2: 94% (20 Aug 2020 01:10) (88% - 100%) I&O's Summary    19 Aug 2020 07:01  -  20 Aug 2020 07:00  --------------------------------------------------------  IN: 25 mL / OUT: 0 mL / NET: 25 mL      GENERAL:  [X]Alert  [X]Oriented x 1 (person)   []Lethargic  []Cachexia  []Unarousable  [X]Verbal  []Non-Verbal  Behavioral:   [] Anxiety  [] Delirium [] Agitation [] Other  HEENT:  []Normal   [X] Dry mouth   []ET Tube/Trach  []Oral lesions  PULMONARY:   [x]Clear []Tachypnea  []Audible excessive secretions   []Rhonchi        []Right []Left []Bilateral  []Crackles        []Right []Left []Bilateral  []Wheezing     []Right []Left []Bilateral  CARDIOVASCULAR:    [x]Regular []Irregular []Tachy  []Justyn []Murmur []Other  GASTROINTESTINAL:  [x]Soft []Distended [x]+BS [x]Non tender []Tender []PEG []OGT/ NGT   Last BM: yesterday evening      GENITOURINARY:  []Normal [] Incontinent   []Oliguria/Anuria   [x]Roth  MUSCULOSKELETAL:   []Normal  [X]Weakness  [x]Bed/Wheelchair bound []Edema  NEUROLOGIC:   []No focal deficits  [x]Cognitive impairment  []Dysphagia []Dysarthria []Paresis []Other   SKIN:   []Normal   []Pressure ulcer(s)  [x]Rash: Please refer to nursing notes for further documentation    CRITICAL CARE:  [ ] Shock Present  [ ]Septic [ ]Cardiogenic [ ]Neurologic [ ]Hypovolemic  [ ]  Vasopressors [ ]  Inotropes   [ ] Respiratory failure present [ ] Mechanical Ventilation [ ] Non-invasive ventilatory support [ ] High-Flow  [ ] Acute  [ ] Chronic [ ] Hypoxic  [ ] Hypercarbic [ ] Other  [ ] Other organ failure     LABS: None new                        7.1    13.78 )-----------( 286      ( 20 Aug 2020 06:35 )             23.1   08-20    134<L>  |  99  |  8   ----------------------------<  91  3.0<L>   |  23  |  0.88    Ca    8.5      20 Aug 2020 06:35  Phos  2.9     08-19  Mg     1.8     08-19    TPro  5.4<L>  /  Alb  2.2<L>  /  TBili  0.5  /  DBili  x   /  AST  27  /  ALT  18  /  AlkPhos  122<H>  08-20  PT/INR - ( 19 Aug 2020 09:20 )   PT: 14.9 sec;   INR: 1.28 ratio         PTT - ( 19 Aug 2020 09:20 )  PTT:28.3 sec    RADIOLOGY & ADDITIONAL STUDIES: None new    PROTEIN CALORIE MALNUTRITION:   [x] PPSV2 < or = 30% [] significant weight loss [] poor nutritional intake [] anasarca [] catabolic state   Albumin, Serum: 2.2 g/dL (08-20-20 @ 06:35)   Artificial Nutrition []     REFERRALS:   []Chaplaincy  []Hospice  []Child Life  [x]Social Work  []Case management []Holistic Therapy []Physical Therapy []Dietary   Goals of Care Document:   Care Coordination Document: Care Coordination Assessment 201 [C. Provider] (08-18-20 @ 12:10)                              Care Coordination Assessment 201    COGNITIVE/LEARNING 201  Mental Status    Answers: Confused,  Answers: Unable to assess    Notes: Mental Status    Notes: Patient has AMS s/p UTI, hx of Dementia.    ADMISSION HISTORY 201  Admitted From    Answers: Acute Hospital    Functional Status Prior to Admission    Answers: Assistive equipment,  Answers: Assistive person    Notes: Functional Status Prior to Admission    Notes: cane, live-in aide    Services Present on Admission    Answers: DME; specify cane    Notes: Services on Admission, Contacts    Notes: Live-in aide    FINANCIAL 201  Does patient have financial concerns for discharge?    Answers: None    LIVING ARRANGEMENTS/SUPPORT 201  Housing Environment    Answers: House,  Answers: Stairs, number of steps 6steps outside/8steps inside    Living Arrangements    Answers: Lives with family    Sources of support/caregivers    Answers: Children,  Answers: Hired Care    Notes: Sources of support    Notes: Son-Yoandy (Emergency contact/HCP 2nd agent) Dtr Sandra Beauchamp (HCP)    CAREGIVER CONTACT 201  Does the patient wish to identify a Caregiver?    Answers: Unable to assess    EMERGENCY CONTACTS OUTSIDE HOME 201  Emergency Contact    Answers: Emergency Contact Name Yoandy Kolb,  Answers: Emergency Contact Phone # [phone],  Answers: Emergency Contact Relationship son    Notes: Emergency Contact:    Notes: Sandra Beauchamp-[phone]    DISCHARGE PLANNING 201  Potential Discharge Plan and Services    Answers: Anticipated Needs Unclear at Present    SCREENING 201  Social Work Screen and Referral    Answers: Adjustment to Illness/Difficulty Coping,  Answers: Major Illness Causing Lifestyles Changes    SUMMARY 201  Initial Clinical Summary    Notes: Social work reviewed medical chart of patient on PCU for psychosocial  assessment and support. As per H&P, patient is a  87-year-old , English  speaking female with HTN, dementia, recent hospitalization at [facility] found to  have L pubic ramus fracture, R sacral fracture, and R UPJ dilation, sent by.  Dr. Hood for abdominal pain and recent decline in functional status. As per  outpatient provider Dr. Hood, pt had a vertebral compression fracture 1 year  ago with no loss of function at that time. However one month ago, patient  developed abdominal pain and decline in functional status.  Pt was seen at  [facility] and diagnosed with sciatica and sent home. On review of imaging, pt  found to have L pubic ramus fracture, R sacral fracture, and R UPJ dilation  size of grapefruit due to obstruction with R kidney chronically shriveled. Dr. Hood spoke to Urology who advised against surgery given pt's chronically  shriveled R kidney; family also opting for more conservative management but  would like to address any reversible causes of pain. During that time, labs and  additional imaging reported to be WNL.     As per son, pt has been having decreased PO intake for the last month along  with loss of function. Pt found to have UTI outpatient, currently on cipro day  6/7; son also reports that pt was getting PPX antibiotics 3 times a week prior.  Pt has had abdominal pain for which they were treating with Tylenol with workup  noted above. Also reports that pt has appeared more dyspneic lately. No melena,  hematochezia, cough, diarrhea, constipation, fever.     Patient unable to participate in assessment s/p dementia, AMS s/p UTI. Patient  unable to identify caregiver. LMSW spoke with patient's son Yoandy to introduce  self and explain role. Patient's son confirmed demographics. Patient's son  Yoandy [phone] reports being emergency contact and secondary HCP. Yoandy  identified his sister Sandra Beauchamp [phone] as primary health care proxy.  LMSW request copy of HCP for medical chart. Patient is a  with 4 children  (Son-Yoandy lives in [place] and 3 others lives in [place]).     Patient resides with son Yoandy and his children in a private house with 6steps  to enter and 8steps inside. Patient's son reports that prior to 6weeks ago  patient was more independent, active, able to climb steps inside and willing to  participate in ADLs, however recently becoming more dependent and refusing to  shower as well as disoriented and confused. Patient went to [facility]  informed having UTI and Kidney mass, transferred to [facility] for further medical  management for decline functional status. Patient requires assistance with  ADLs. Patient has a private hire live-in aide for the last 14years, aide at  bedside and will stay over to provide ongoing social support. Patient has cane  to assist with balance, gait. Patient's PCP is Dr. Lynda Hood [phone] or  [phone]. Patient receives her medications from [facility] .  Patient's son drives patient to medical appts.    Anticipated Discharge Plan and Services    Notes: Patient's discharge needs are uncleared at present and will be assessed  when appropriate. Pastoral care remains available. LMSW assured availability  and provide contact number. LMSW remains available for ongoing needs.       Electronically signed by:  Fay Lock  Electronically signed on:  2020-08-18  12:09 GAP TEAM PALLIATIVE CARE UNIT PROGRESS NOTE:      [] Patient on hospice program.    INDICATION FOR PALLIATIVE CARE UNIT SERVICES: Symptom Control, Comfort Measures, GOC    INTERVAL HPI/OVERNIGHT EVENTS: No acute events overnight. Pt feels better and was able to have a normal BM, no blood noted. Received 2 mg Morphine over the past 24 hours for pain.     DNR on chart: Yes  Yes    Allergies    No Known Allergies    Intolerances    MEDICATIONS  (STANDING):  donepezil 10 milliGRAM(s) Oral at bedtime  escitalopram 5 milliGRAM(s) Oral daily  ferrous    sulfate 325 milliGRAM(s) Oral daily  heparin   Injectable 5000 Unit(s) SubCutaneous every 8 hours  lactated ringers. 1000 milliLiter(s) (100 mL/Hr) IV Continuous <Continuous>  melatonin 3 milliGRAM(s) Oral at bedtime  piperacillin/tazobactam IVPB.. 3.375 Gram(s) IV Intermittent every 8 hours  potassium chloride   Powder 40 milliEquivalent(s) Oral every 4 hours  senna 2 Tablet(s) Oral at bedtime    MEDICATIONS  (PRN):  acetaminophen   Tablet .. 650 milliGRAM(s) Oral every 6 hours PRN Mild Pain (1 - 3)  bisacodyl Suppository 10 milliGRAM(s) Rectal daily PRN Constipation  morphine  - Injectable 1 milliGRAM(s) IV Push every 4 hours PRN moderate to severe pain    ITEMS UNCHECKED ARE NOT PRESENT    PRESENT SYMPTOMS: [x]Unable to obtain due to poor mentation   Source if other than patient:  [x]Family   [x]Team     Pain: [X] yes [] no  QOL impact - worsening functional status over the last month  Location - right side abdomen                    Aggravating factors - none  Quality - unable to assess  Radiation - none  Timing - 1 month  Severity (0-10 scale): unable to assess  Minimal acceptable level (0-10 scale): unable to assess     Dyspnea:                           []Mild []Moderate []Severe  Anxiety:                             []Mild []Moderate []Severe  Fatigue:                             []Mild []Moderate []Severe  Nausea:                             []Mild []Moderate []Severe  Loss of appetite:              []Mild []Moderate []Severe  Constipation:                    []Mild []Moderate []Severe    PAINAD Score:  http://geriatrictoolkit.[facility]/cog/painad.pdf  		  Other Symptoms:  [x]All other review of systems negative     Karnofsky Performance Score/Palliative Performance Status Version 2:         %  http://[facility].org/files/news/palliative_performance_scale_ppsv2.pdf    PHYSICAL EXAM:  Vital Signs Last 24 Hrs  T(C): 36.1 (20 Aug 2020 01:10), Max: 36.7 (19 Aug 2020 12:07)  T(F): 96.9 (20 Aug 2020 01:10), Max: 98.1 (19 Aug 2020 12:07)  HR: 71 (20 Aug 2020 01:10) (71 - 94)  BP: 114/58 (20 Aug 2020 01:10) (100/65 - 133/74)  BP(mean): 73 (19 Aug 2020 14:30) (64 - 77)  RR: 18 (20 Aug 2020 01:10) (16 - 22)  SpO2: 94% (20 Aug 2020 01:10) (88% - 100%) I&O's Summary    19 Aug 2020 07:01  -  20 Aug 2020 07:00  --------------------------------------------------------  IN: 25 mL / OUT: 0 mL / NET: 25 mL      GENERAL:  [X]Alert  [X]Oriented x 1 (person)   []Lethargic  []Cachexia  []Unarousable  [X]Verbal  []Non-Verbal  Behavioral:   [] Anxiety  [] Delirium [] Agitation [] Other  HEENT:  []Normal   [X] Dry mouth   []ET Tube/Trach  []Oral lesions  PULMONARY:   [x]Clear []Tachypnea  []Audible excessive secretions   []Rhonchi        []Right []Left []Bilateral  []Crackles        []Right []Left []Bilateral  []Wheezing     []Right []Left []Bilateral  CARDIOVASCULAR:    [x]Regular []Irregular []Tachy  []Justyn []Murmur []Other  GASTROINTESTINAL:  [x]Soft []Distended [x]+BS [x]Non tender []Tender []PEG []OGT/ NGT   Last BM: yesterday evening      GENITOURINARY: R nephrostomy tube, 100 cc purulent fluid noted   []Normal [] Incontinent   []Oliguria/Anuria   [x]Roth  MUSCULOSKELETAL:   []Normal  [X]Weakness  [x]Bed/Wheelchair bound []Edema  NEUROLOGIC:   []No focal deficits  [x]Cognitive impairment  []Dysphagia []Dysarthria []Paresis []Other   SKIN:   []Normal   []Pressure ulcer(s)  [x]Rash: Please refer to nursing notes for further documentation    CRITICAL CARE:  [ ] Shock Present  [ ]Septic [ ]Cardiogenic [ ]Neurologic [ ]Hypovolemic  [ ]  Vasopressors [ ]  Inotropes   [ ] Respiratory failure present [ ] Mechanical Ventilation [ ] Non-invasive ventilatory support [ ] High-Flow  [ ] Acute  [ ] Chronic [ ] Hypoxic  [ ] Hypercarbic [ ] Other  [ ] Other organ failure     LABS: None new                        7.1    13.78 )-----------( 286      ( 20 Aug 2020 06:35 )             23.1   08-20    134<L>  |  99  |  8   ----------------------------<  91  3.0<L>   |  23  |  0.88    Ca    8.5      20 Aug 2020 06:35  Phos  2.9     08-19  Mg     1.8     08-19    TPro  5.4<L>  /  Alb  2.2<L>  /  TBili  0.5  /  DBili  x   /  AST  27  /  ALT  18  /  AlkPhos  122<H>  08-20  PT/INR - ( 19 Aug 2020 09:20 )   PT: 14.9 sec;   INR: 1.28 ratio         PTT - ( 19 Aug 2020 09:20 )  PTT:28.3 sec    RADIOLOGY & ADDITIONAL STUDIES: None new    PROTEIN CALORIE MALNUTRITION:   [x] PPSV2 < or = 30% [] significant weight loss [] poor nutritional intake [] anasarca [] catabolic state   Albumin, Serum: 2.2 g/dL (08-20-20 @ 06:35)   Artificial Nutrition []     REFERRALS:   []Chaplaincy  []Hospice  []Child Life  [x]Social Work  []Case management []Holistic Therapy [x]Physical Therapy []Dietary   Goals of Care Document:   Care Coordination Document: Care Coordination Assessment 201 [C. Provider] (08-18-20 @ 12:10)                              Care Coordination Assessment 201    COGNITIVE/LEARNING 201  Mental Status    Answers: Confused,  Answers: Unable to assess    Notes: Mental Status    Notes: Patient has AMS s/p UTI, hx of Dementia.    ADMISSION HISTORY 201  Admitted From    Answers: Acute Hospital    Functional Status Prior to Admission    Answers: Assistive equipment,  Answers: Assistive person    Notes: Functional Status Prior to Admission    Notes: cane, live-in aide    Services Present on Admission    Answers: DME; specify cane    Notes: Services on Admission, Contacts    Notes: Live-in aide    FINANCIAL 201  Does patient have financial concerns for discharge?    Answers: None    LIVING ARRANGEMENTS/SUPPORT 201  Housing Environment    Answers: House,  Answers: Stairs, number of steps 6steps outside/8steps inside    Living Arrangements    Answers: Lives with family    Sources of support/caregivers    Answers: Children,  Answers: Hired Care    Notes: Sources of support    Notes: Son-Yoandy (Emergency contact/HCP 2nd agent) Dtr Sandra Beauchamp (HCP)    CAREGIVER CONTACT 201  Does the patient wish to identify a Caregiver?    Answers: Unable to assess    EMERGENCY CONTACTS OUTSIDE HOME 201  Emergency Contact    Answers: Emergency Contact Name Yoandy Kolb,  Answers: Emergency Contact Phone # [phone],  Answers: Emergency Contact Relationship son    Notes: Emergency Contact:    Notes: Sandra Beauchamp-[phone]    DISCHARGE PLANNING 201  Potential Discharge Plan and Services    Answers: Anticipated Needs Unclear at Present    SCREENING 201  Social Work Screen and Referral    Answers: Adjustment to Illness/Difficulty Coping,  Answers: Major Illness Causing Lifestyles Changes    SUMMARY 201  Initial Clinical Summary    Notes: Social work reviewed medical chart of patient on PCU for psychosocial  assessment and support. As per H&P, patient is a  87-year-old , English  speaking female with HTN, dementia, recent hospitalization at [facility] found to  have L pubic ramus fracture, R sacral fracture, and R UPJ dilation, sent by.  Dr. Hood for abdominal pain and recent decline in functional status. As per  outpatient provider Dr. Hood, pt had a vertebral compression fracture 1 year  ago with no loss of function at that time. However one month ago, patient  developed abdominal pain and decline in functional status.  Pt was seen at  [facility] and diagnosed with sciatica and sent home. On review of imaging, pt  found to have L pubic ramus fracture, R sacral fracture, and R UPJ dilation  size of grapefruit due to obstruction with R kidney chronically shriveled. Dr. Hood spoke to Urology who advised against surgery given pt's chronically  shriveled R kidney; family also opting for more conservative management but  would like to address any reversible causes of pain. During that time, labs and  additional imaging reported to be WNL.     As per son, pt has been having decreased PO intake for the last month along  with loss of function. Pt found to have UTI outpatient, currently on cipro day  6/7; son also reports that pt was getting PPX antibiotics 3 times a week prior.  Pt has had abdominal pain for which they were treating with Tylenol with workup  noted above. Also reports that pt has appeared more dyspneic lately. No melena,  hematochezia, cough, diarrhea, constipation, fever.     Patient unable to participate in assessment s/p dementia, AMS s/p UTI. Patient  unable to identify caregiver. LMSW spoke with patient's son Yoandy to introduce  self and explain role. Patient's son confirmed demographics. Patient's son  Yoandy [phone] reports being emergency contact and secondary HCP. Yoandy  identified his sister Sandra Beauchamp [phone] as primary health care proxy.  LMSW request copy of HCP for medical chart. Patient is a  with 4 children  (Son-Yoandy lives in [place] and 3 others lives in [place]).     Patient resides with son Yoandy and his children in a private house with 6steps  to enter and 8steps inside. Patient's son reports that prior to 6weeks ago  patient was more independent, active, able to climb steps inside and willing to  participate in ADLs, however recently becoming more dependent and refusing to  shower as well as disoriented and confused. Patient went to [facility]  informed having UTI and Kidney mass, transferred to [facility] for further medical  management for decline functional status. Patient requires assistance with  ADLs. Patient has a private hire live-in aide for the last 14years, aide at  bedside and will stay over to provide ongoing social support. Patient has cane  to assist with balance, gait. Patient's PCP is Dr. Lynda Hood [phone] or  [phone]. Patient receives her medications from [facility] .  Patient's son drives patient to medical appts.    Anticipated Discharge Plan and Services    Notes: Patient's discharge needs are uncleared at present and will be assessed  when appropriate. Pastoral care remains available. LMSW assured availability  and provide contact number. LMSW remains available for ongoing needs.       Electronically signed by:  Fay Lock  Electronically signed on:  2020-08-18  12:09 GAP TEAM PALLIATIVE CARE UNIT PROGRESS NOTE:      [] Patient on hospice program.    INDICATION FOR PALLIATIVE CARE UNIT SERVICES: Symptom Control, Comfort Measures, GOC    INTERVAL HPI/OVERNIGHT EVENTS: No acute events overnight. Pt feels better and was able to have a normal BM, no blood noted. Received 2 mg Morphine over the past 24 hours for pain.     DNR on chart: Yes  Yes    Allergies    No Known Allergies    Intolerances    MEDICATIONS  (STANDING):  donepezil 10 milliGRAM(s) Oral at bedtime  escitalopram 5 milliGRAM(s) Oral daily  ferrous    sulfate 325 milliGRAM(s) Oral daily  heparin   Injectable 5000 Unit(s) SubCutaneous every 8 hours  lactated ringers. 1000 milliLiter(s) (100 mL/Hr) IV Continuous <Continuous>  melatonin 3 milliGRAM(s) Oral at bedtime  piperacillin/tazobactam IVPB.. 3.375 Gram(s) IV Intermittent every 8 hours  potassium chloride   Powder 40 milliEquivalent(s) Oral every 4 hours  senna 2 Tablet(s) Oral at bedtime    MEDICATIONS  (PRN):  acetaminophen   Tablet .. 650 milliGRAM(s) Oral every 6 hours PRN Mild Pain (1 - 3)  bisacodyl Suppository 10 milliGRAM(s) Rectal daily PRN Constipation  morphine  - Injectable 1 milliGRAM(s) IV Push every 4 hours PRN moderate to severe pain    ITEMS UNCHECKED ARE NOT PRESENT    PRESENT SYMPTOMS: [x]Unable to obtain due to poor mentation   Source if other than patient:  [x]Family   [x]Team     Pain: [ ] yes [x] no  QOL impact -   Location -           Aggravating factors -   Quality -  Radiation -   Timing -   Severity (0-10 scale): ss  Minimal acceptable level (0-10 scale):     Dyspnea:                           []Mild []Moderate []Severe  Anxiety:                             []Mild []Moderate []Severe  Fatigue:                             []Mild []Moderate []Severe  Nausea:                             []Mild []Moderate []Severe  Loss of appetite:              []Mild []Moderate []Severe  Constipation:                    []Mild []Moderate []Severe    PAINAD Score: 0  http://geriatrictoolkit.[place].[facility]/cog/painad.pdf  		  Other Symptoms:  [x]All other review of systems negative     Karnofsky Performance Score/Palliative Performance Status Version 2:    40     %  http://npcrc.org/files/news/palliative_performance_scale_ppsv2.pdf    PHYSICAL EXAM:  Vital Signs Last 24 Hrs  T(C): 36.1 (20 Aug 2020 01:10), Max: 36.7 (19 Aug 2020 12:07)  T(F): 96.9 (20 Aug 2020 01:10), Max: 98.1 (19 Aug 2020 12:07)  HR: 71 (20 Aug 2020 01:10) (71 - 94)  BP: 114/58 (20 Aug 2020 01:10) (100/65 - 133/74)  BP(mean): 73 (19 Aug 2020 14:30) (64 - 77)  RR: 18 (20 Aug 2020 01:10) (16 - 22)  SpO2: 94% (20 Aug 2020 01:10) (88% - 100%) I&O's Summary    19 Aug 2020 07:01  -  20 Aug 2020 07:00  --------------------------------------------------------  IN: 25 mL / OUT: 0 mL / NET: 25 mL      GENERAL:  [X]Alert  [X]Oriented x 1 (person)   []Lethargic  []Cachexia  []Unarousable  [X]Verbal  []Non-Verbal  Behavioral:   [] Anxiety  [] Delirium [] Agitation [] Other  HEENT:  []Normal   [X] Dry mouth   []ET Tube/Trach  []Oral lesions  PULMONARY:   [x]Clear []Tachypnea  []Audible excessive secretions   []Rhonchi        []Right []Left []Bilateral  []Crackles        []Right []Left []Bilateral  []Wheezing     []Right []Left []Bilateral  CARDIOVASCULAR:    [x]Regular []Irregular []Tachy  []Justyn []Murmur []Other  GASTROINTESTINAL:  [x]Soft []Distended [x]+BS [x]Non tender []Tender []PEG []OGT/ NGT   Last BM: yesterday evening      GENITOURINARY: R nephrostomy tube, 100 cc purulent fluid noted   []Normal [] Incontinent   []Oliguria/Anuria   [x]Roth acute urinary retention  MUSCULOSKELETAL:   []Normal  [X]Weakness  [x]Bed/Wheelchair bound []Edema  NEUROLOGIC:   []No focal deficits  [x]Cognitive impairment  []Dysphagia []Dysarthria []Paresis []Other   SKIN:   []Normal   []Pressure ulcer(s)  [x]Rash: Please refer to nursing notes for further documentation    CRITICAL CARE:  [ ] Shock Present  [ ]Septic [ ]Cardiogenic [ ]Neurologic [ ]Hypovolemic  [ ]  Vasopressors [ ]  Inotropes   [ ] Respiratory failure present [ ] Mechanical Ventilation [ ] Non-invasive ventilatory support [ ] High-Flow  [ ] Acute  [ ] Chronic [ ] Hypoxic  [ ] Hypercarbic [ ] Other  [ ] Other organ failure     LABS: None new                        7.1    13.78 )-----------( 286      ( 20 Aug 2020 06:35 )             23.1   08-20    134<L>  |  99  |  8   ----------------------------<  91  3.0<L>   |  23  |  0.88    Ca    8.5      20 Aug 2020 06:35  Phos  2.9     08-19  Mg     1.8     08-19    TPro  5.4<L>  /  Alb  2.2<L>  /  TBili  0.5  /  DBili  x   /  AST  27  /  ALT  18  /  AlkPhos  122<H>  08-20  PT/INR - ( 19 Aug 2020 09:20 )   PT: 14.9 sec;   INR: 1.28 ratio         PTT - ( 19 Aug 2020 09:20 )  PTT:28.3 sec    RADIOLOGY & ADDITIONAL STUDIES: None new    PROTEIN CALORIE MALNUTRITION:   [x] PPSV2 < or = 30% [] significant weight loss [] poor nutritional intake [] anasarca [] catabolic state   Albumin, Serum: 2.2 g/dL (08-20-20 @ 06:35)   Artificial Nutrition []     REFERRALS:   []Chaplaincy  []Hospice  []Child Life  [x]Social Work  []Case management []Holistic Therapy [x]Physical Therapy []Dietary   Goals of Care Document:   Care Coordination Document: Care Coordination Assessment 201 [C. Provider] (08-18-20 @ 12:10)                              Care Coordination Assessment 201    COGNITIVE/LEARNING 201  Mental Status    Answers: Confused,  Answers: Unable to assess    Notes: Mental Status    Notes: Patient has AMS s/p UTI, hx of Dementia.    ADMISSION HISTORY 201  Admitted From    Answers: Acute Hospital    Functional Status Prior to Admission    Answers: Assistive equipment,  Answers: Assistive person    Notes: Functional Status Prior to Admission    Notes: cane, live-in aide    Services Present on Admission    Answers: DME; specify cane    Notes: Services on Admission, Contacts    Notes: Live-in aide    FINANCIAL 201  Does patient have financial concerns for discharge?    Answers: None    LIVING ARRANGEMENTS/SUPPORT 201  Housing Environment    Answers: House,  Answers: Stairs, number of steps 6steps outside/8steps inside    Living Arrangements    Answers: Lives with family    Sources of support/caregivers    Answers: Children,  Answers: Hired Care    Notes: Sources of support    Notes: Son-Yoandy (Emergency contact/HCP 2nd agent) Dtr Sandra Beauchamp (HCP)    CAREGIVER CONTACT 201  Does the patient wish to identify a Caregiver?    Answers: Unable to assess    EMERGENCY CONTACTS OUTSIDE HOME 201  Emergency Contact    Answers: Emergency Contact Name Yoandy Kolb,  Answers: Emergency Contact Phone # [phone],  Answers: Emergency Contact Relationship son    Notes: Emergency Contact:    Notes: Sandra Beauchamp-[phone]    DISCHARGE PLANNING 201  Potential Discharge Plan and Services    Answers: Anticipated Needs Unclear at Present    SCREENING 201  Social Work Screen and Referral    Answers: Adjustment to Illness/Difficulty Coping,  Answers: Major Illness Causing Lifestyles Changes    SUMMARY 201  Initial Clinical Summary    Notes: Social work reviewed medical chart of patient on PCU for psychosocial  assessment and support. As per H&P, patient is a  87-year-old , English  speaking female with HTN, dementia, recent hospitalization at [facility] found to  have L pubic ramus fracture, R sacral fracture, and R UPJ dilation, sent by.  Dr. Hood for abdominal pain and recent decline in functional status. As per  outpatient provider Dr. Hood, pt had a vertebral compression fracture 1 year  ago with no loss of function at that time. However one month ago, patient  developed abdominal pain and decline in functional status.  Pt was seen at  [facility] and diagnosed with sciatica and sent home. On review of imaging, pt  found to have L pubic ramus fracture, R sacral fracture, and R UPJ dilation  size of grapefruit due to obstruction with R kidney chronically shriveled. Dr. Hood spoke to Urology who advised against surgery given pt's chronically  shriveled R kidney; family also opting for more conservative management but  would like to address any reversible causes of pain. During that time, labs and  additional imaging reported to be WNL.     As per son, pt has been having decreased PO intake for the last month along  with loss of function. Pt found to have UTI outpatient, currently on cipro day  6/7; son also reports that pt was getting PPX antibiotics 3 times a week prior.  Pt has had abdominal pain for which they were treating with Tylenol with workup  noted above. Also reports that pt has appeared more dyspneic lately. No melena,  hematochezia, cough, diarrhea, constipation, fever.     Patient unable to participate in assessment s/p dementia, AMS s/p UTI. Patient  unable to identify caregiver. LMSW spoke with patient's son Yoandy to introduce  self and explain role. Patient's son confirmed demographics. Patient's son  Yoandy [phone] reports being emergency contact and secondary HCP. Yoandy  identified his sister Sandra Beauchamp [phone] as primary health care proxy.  LMSW request copy of HCP for medical chart. Patient is a  with 4 children  (Son-Yoandy lives in [place] and 3 others lives in [place]).     Patient resides with son Yoandy and his children in a private house with 6steps  to enter and 8steps inside. Patient's son reports that prior to 6weeks ago  patient was more independent, active, able to climb steps inside and willing to  participate in ADLs, however recently becoming more dependent and refusing to  shower as well as disoriented and confused. Patient went to [facility]  informed having UTI and Kidney mass, transferred to [facility] for further medical  management for decline functional status. Patient requires assistance with  ADLs. Patient has a private hire live-in aide for the last 14years, aide at  bedside and will stay over to provide ongoing social support. Patient has cane  to assist with balance, gait. Patient's PCP is Dr. Lynda Hood [phone] or  [phone]. Patient receives her medications from [facility] .  Patient's son drives patient to medical appts.    Anticipated Discharge Plan and Services    Notes: Patient's discharge needs are uncleared at present and will be assessed  when appropriate. Pastoral care remains available. LMSW assured availability  and provide contact number. LMSW remains available for ongoing needs.       Electronically signed by:  Fay Lock  Electronically signed on:  2020-08-18  12:09

## 2020-08-20 NOTE — PROGRESS NOTE ADULT - PROBLEM SELECTOR PLAN 5
- Pt w/ recent decline in functional status over the last month, less ambulatory. Prior to that ambulated with cane  - Consider PT eval   - Supportive care - Pt w/ recent decline in functional status over the last month, less ambulatory. Prior to that ambulated with cane  - PT eval   - Supportive care

## 2020-08-21 LAB
-  AMIKACIN: SIGNIFICANT CHANGE UP
-  AMOXICILLIN/CLAVULANIC ACID: SIGNIFICANT CHANGE UP
-  AMPICILLIN/SULBACTAM: SIGNIFICANT CHANGE UP
-  AMPICILLIN: SIGNIFICANT CHANGE UP
-  AZTREONAM: SIGNIFICANT CHANGE UP
-  CEFAZOLIN: SIGNIFICANT CHANGE UP
-  CEFEPIME: SIGNIFICANT CHANGE UP
-  CEFOXITIN: SIGNIFICANT CHANGE UP
-  CEFTRIAXONE: SIGNIFICANT CHANGE UP
-  CIPROFLOXACIN: SIGNIFICANT CHANGE UP
-  ERTAPENEM: SIGNIFICANT CHANGE UP
-  GENTAMICIN: SIGNIFICANT CHANGE UP
-  IMIPENEM: SIGNIFICANT CHANGE UP
-  LEVOFLOXACIN: SIGNIFICANT CHANGE UP
-  MEROPENEM: SIGNIFICANT CHANGE UP
-  NITROFURANTOIN: SIGNIFICANT CHANGE UP
-  PIPERACILLIN/TAZOBACTAM: SIGNIFICANT CHANGE UP
-  TIGECYCLINE: SIGNIFICANT CHANGE UP
-  TOBRAMYCIN: SIGNIFICANT CHANGE UP
-  TRIMETHOPRIM/SULFAMETHOXAZOLE: SIGNIFICANT CHANGE UP
ANION GAP SERPL CALC-SCNC: 9 MMOL/L — SIGNIFICANT CHANGE UP (ref 5–17)
BASOPHILS # BLD AUTO: 0.06 K/UL — SIGNIFICANT CHANGE UP (ref 0–0.2)
BASOPHILS NFR BLD AUTO: 0.5 % — SIGNIFICANT CHANGE UP (ref 0–2)
BUN SERPL-MCNC: 8 MG/DL — SIGNIFICANT CHANGE UP (ref 7–23)
CALCIUM SERPL-MCNC: 8.8 MG/DL — SIGNIFICANT CHANGE UP (ref 8.4–10.5)
CHLORIDE SERPL-SCNC: 102 MMOL/L — SIGNIFICANT CHANGE UP (ref 96–108)
CO2 SERPL-SCNC: 26 MMOL/L — SIGNIFICANT CHANGE UP (ref 22–31)
CREAT SERPL-MCNC: 0.87 MG/DL — SIGNIFICANT CHANGE UP (ref 0.5–1.3)
CULTURE RESULTS: SIGNIFICANT CHANGE UP
EOSINOPHIL # BLD AUTO: 0.28 K/UL — SIGNIFICANT CHANGE UP (ref 0–0.5)
EOSINOPHIL NFR BLD AUTO: 2.1 % — SIGNIFICANT CHANGE UP (ref 0–6)
GLUCOSE SERPL-MCNC: 95 MG/DL — SIGNIFICANT CHANGE UP (ref 70–99)
HCT VFR BLD CALC: 25.2 % — LOW (ref 34.5–45)
HGB BLD-MCNC: 7.8 G/DL — LOW (ref 11.5–15.5)
IMM GRANULOCYTES NFR BLD AUTO: 3 % — HIGH (ref 0–1.5)
LYMPHOCYTES # BLD AUTO: 24.3 % — SIGNIFICANT CHANGE UP (ref 13–44)
LYMPHOCYTES # BLD AUTO: 3.18 K/UL — SIGNIFICANT CHANGE UP (ref 1–3.3)
MAGNESIUM SERPL-MCNC: 1.8 MG/DL — SIGNIFICANT CHANGE UP (ref 1.6–2.6)
MCHC RBC-ENTMCNC: 28.5 PG — SIGNIFICANT CHANGE UP (ref 27–34)
MCHC RBC-ENTMCNC: 31 GM/DL — LOW (ref 32–36)
MCV RBC AUTO: 92 FL — SIGNIFICANT CHANGE UP (ref 80–100)
METHOD TYPE: SIGNIFICANT CHANGE UP
MONOCYTES # BLD AUTO: 0.9 K/UL — SIGNIFICANT CHANGE UP (ref 0–0.9)
MONOCYTES NFR BLD AUTO: 6.9 % — SIGNIFICANT CHANGE UP (ref 2–14)
NEUTROPHILS # BLD AUTO: 8.28 K/UL — HIGH (ref 1.8–7.4)
NEUTROPHILS NFR BLD AUTO: 63.2 % — SIGNIFICANT CHANGE UP (ref 43–77)
NRBC # BLD: 0 /100 WBCS — SIGNIFICANT CHANGE UP (ref 0–0)
OB PNL STL: POSITIVE
ORGANISM # SPEC MICROSCOPIC CNT: SIGNIFICANT CHANGE UP
ORGANISM # SPEC MICROSCOPIC CNT: SIGNIFICANT CHANGE UP
PHOSPHATE SERPL-MCNC: 2.2 MG/DL — LOW (ref 2.5–4.5)
PLATELET # BLD AUTO: 326 K/UL — SIGNIFICANT CHANGE UP (ref 150–400)
POTASSIUM SERPL-MCNC: 3.9 MMOL/L — SIGNIFICANT CHANGE UP (ref 3.5–5.3)
POTASSIUM SERPL-SCNC: 3.9 MMOL/L — SIGNIFICANT CHANGE UP (ref 3.5–5.3)
RBC # BLD: 2.74 M/UL — LOW (ref 3.8–5.2)
RBC # FLD: 15.9 % — HIGH (ref 10.3–14.5)
SODIUM SERPL-SCNC: 135 MMOL/L — SIGNIFICANT CHANGE UP (ref 135–145)
SPECIMEN SOURCE: SIGNIFICANT CHANGE UP
WBC # BLD: 13.09 K/UL — HIGH (ref 3.8–10.5)
WBC # FLD AUTO: 13.09 K/UL — HIGH (ref 3.8–10.5)

## 2020-08-21 PROCEDURE — 99232 SBSQ HOSP IP/OBS MODERATE 35: CPT | Mod: GC

## 2020-08-21 PROCEDURE — 74177 CT ABD & PELVIS W/CONTRAST: CPT | Mod: 26

## 2020-08-21 PROCEDURE — 99231 SBSQ HOSP IP/OBS SF/LOW 25: CPT

## 2020-08-21 RX ORDER — CEFTRIAXONE 500 MG/1
1000 INJECTION, POWDER, FOR SOLUTION INTRAMUSCULAR; INTRAVENOUS EVERY 24 HOURS
Refills: 0 | Status: DISCONTINUED | OUTPATIENT
Start: 2020-08-21 | End: 2020-08-24

## 2020-08-21 RX ORDER — SODIUM CHLORIDE 9 MG/ML
1000 INJECTION INTRAMUSCULAR; INTRAVENOUS; SUBCUTANEOUS
Refills: 0 | Status: DISCONTINUED | OUTPATIENT
Start: 2020-08-21 | End: 2020-08-24

## 2020-08-21 RX ORDER — MORPHINE SULFATE 50 MG/1
1 CAPSULE, EXTENDED RELEASE ORAL EVERY 4 HOURS
Refills: 0 | Status: DISCONTINUED | OUTPATIENT
Start: 2020-08-21 | End: 2020-08-24

## 2020-08-21 RX ADMIN — HEPARIN SODIUM 5000 UNIT(S): 5000 INJECTION INTRAVENOUS; SUBCUTANEOUS at 07:22

## 2020-08-21 RX ADMIN — HEPARIN SODIUM 5000 UNIT(S): 5000 INJECTION INTRAVENOUS; SUBCUTANEOUS at 16:53

## 2020-08-21 RX ADMIN — SODIUM CHLORIDE 100 MILLILITER(S): 9 INJECTION INTRAMUSCULAR; INTRAVENOUS; SUBCUTANEOUS at 14:54

## 2020-08-21 RX ADMIN — PIPERACILLIN AND TAZOBACTAM 25 GRAM(S): 4; .5 INJECTION, POWDER, LYOPHILIZED, FOR SOLUTION INTRAVENOUS at 14:24

## 2020-08-21 RX ADMIN — Medication 3 MILLIGRAM(S): at 21:58

## 2020-08-21 RX ADMIN — Medication 650 MILLIGRAM(S): at 21:27

## 2020-08-21 RX ADMIN — Medication 325 MILLIGRAM(S): at 12:08

## 2020-08-21 RX ADMIN — CEFTRIAXONE 100 MILLIGRAM(S): 500 INJECTION, POWDER, FOR SOLUTION INTRAMUSCULAR; INTRAVENOUS at 22:10

## 2020-08-21 RX ADMIN — DONEPEZIL HYDROCHLORIDE 10 MILLIGRAM(S): 10 TABLET, FILM COATED ORAL at 21:58

## 2020-08-21 RX ADMIN — Medication 650 MILLIGRAM(S): at 22:00

## 2020-08-21 RX ADMIN — ESCITALOPRAM OXALATE 5 MILLIGRAM(S): 10 TABLET, FILM COATED ORAL at 12:08

## 2020-08-21 RX ADMIN — HEPARIN SODIUM 5000 UNIT(S): 5000 INJECTION INTRAVENOUS; SUBCUTANEOUS at 00:04

## 2020-08-21 RX ADMIN — PIPERACILLIN AND TAZOBACTAM 25 GRAM(S): 4; .5 INJECTION, POWDER, LYOPHILIZED, FOR SOLUTION INTRAVENOUS at 05:55

## 2020-08-21 NOTE — PROGRESS NOTE ADULT - PROBLEM SELECTOR PLAN 5
- Pt w/ recent decline in functional status over the last month, less ambulatory. Prior to that ambulated with cane  - PT eval   - Supportive care - Pt w/ recent decline in functional status over the last month, less ambulatory. Prior to that ambulated with cane  - PT eval - home PT  - Supportive care

## 2020-08-21 NOTE — PROGRESS NOTE ADULT - SUBJECTIVE AND OBJECTIVE BOX
GAP TEAM PALLIATIVE CARE UNIT PROGRESS NOTE:      [] Patient on hospice program.    INDICATION FOR PALLIATIVE CARE UNIT SERVICES: Symptom Control, Comfort Measures, GOC    INTERVAL HPI/OVERNIGHT EVENTS: No acute events overnight. Pt feels well and denies any pain. Had 1x BM. No pain medications used in the last 24 hours. Pt is on her third day of Zosyn.     DNR on chart: Yes  Yes    Allergies    No Known Allergies    Intolerances    MEDICATIONS  (STANDING):  donepezil 10 milliGRAM(s) Oral at bedtime  escitalopram 5 milliGRAM(s) Oral daily  ferrous    sulfate 325 milliGRAM(s) Oral daily  heparin   Injectable 5000 Unit(s) SubCutaneous every 8 hours  lactated ringers. 1000 milliLiter(s) (100 mL/Hr) IV Continuous <Continuous>  melatonin 3 milliGRAM(s) Oral at bedtime  piperacillin/tazobactam IVPB.. 3.375 Gram(s) IV Intermittent every 8 hours  potassium chloride   Powder 40 milliEquivalent(s) Oral every 4 hours  senna 2 Tablet(s) Oral at bedtime    MEDICATIONS  (PRN):  acetaminophen   Tablet .. 650 milliGRAM(s) Oral every 6 hours PRN Mild Pain (1 - 3)  bisacodyl Suppository 10 milliGRAM(s) Rectal daily PRN Constipation  morphine  - Injectable 1 milliGRAM(s) IV Push every 4 hours PRN moderate to severe pain    ITEMS UNCHECKED ARE NOT PRESENT    PRESENT SYMPTOMS: [x]Unable to obtain due to poor mentation   Source if other than patient:  [x]Family   [x]Team     Pain: [ ] yes [x] no  QOL impact -   Location -           Aggravating factors -   Quality -  Radiation -   Timing -   Severity (0-10 scale):   Minimal acceptable level (0-10 scale):     Dyspnea:                           []Mild []Moderate []Severe  Anxiety:                             []Mild []Moderate []Severe  Fatigue:                             []Mild []Moderate []Severe  Nausea:                             []Mild []Moderate []Severe  Loss of appetite:              []Mild []Moderate []Severe  Constipation:                    []Mild []Moderate []Severe    PAINAD Score: 0  http://geriatrictoolkit.[place].[facility]/cog/painad.pdf  		  Other Symptoms:  [x]All other review of systems negative     Karnofsky Performance Score/Palliative Performance Status Version 2:    40     %  http://npcrc.org/files/news/palliative_performance_scale_ppsv2.pdf    PHYSICAL EXAM:  Vital Signs Last 24 Hrs  T(C): 36.1 (20 Aug 2020 01:10), Max: 36.7 (19 Aug 2020 12:07)  T(F): 96.9 (20 Aug 2020 01:10), Max: 98.1 (19 Aug 2020 12:07)  HR: 71 (20 Aug 2020 01:10) (71 - 94)  BP: 114/58 (20 Aug 2020 01:10) (100/65 - 133/74)  BP(mean): 73 (19 Aug 2020 14:30) (64 - 77)  RR: 18 (20 Aug 2020 01:10) (16 - 22)  SpO2: 94% (20 Aug 2020 01:10) (88% - 100%) I&O's Summary    19 Aug 2020 07:01  -  20 Aug 2020 07:00  --------------------------------------------------------  IN: 25 mL / OUT: 0 mL / NET: 25 mL      GENERAL:  [X]Alert  [X]Oriented x 1 (person)   []Lethargic  []Cachexia  []Unarousable  [X]Verbal  []Non-Verbal  Behavioral:   [] Anxiety  [] Delirium [] Agitation [] Other  HEENT:  []Normal   [X] Dry mouth   []ET Tube/Trach  []Oral lesions  PULMONARY:   [x]Clear []Tachypnea  []Audible excessive secretions   []Rhonchi        []Right []Left []Bilateral  []Crackles        []Right []Left []Bilateral  []Wheezing     []Right []Left []Bilateral  CARDIOVASCULAR:    [x]Regular []Irregular []Tachy  []Justyn []Murmur []Other  GASTROINTESTINAL:  [x]Soft []Distended [x]+BS [x]Non tender []Tender []PEG []OGT/ NGT   Last BM: yesterday evening      GENITOURINARY: R nephrostomy tube, 150 cc brown purulent fluid noted   []Normal [] Incontinent   []Oliguria/Anuria   [x]Roth acute urinary retention  MUSCULOSKELETAL:   []Normal  [X]Weakness  [x]Bed/Wheelchair bound []Edema  NEUROLOGIC:   []No focal deficits  [x]Cognitive impairment  []Dysphagia []Dysarthria []Paresis []Other   SKIN:   []Normal   []Pressure ulcer(s)  [x]Rash: Please refer to nursing notes for further documentation    CRITICAL CARE:  [ ] Shock Present  [ ]Septic [ ]Cardiogenic [ ]Neurologic [ ]Hypovolemic  [ ]  Vasopressors [ ]  Inotropes   [ ] Respiratory failure present [ ] Mechanical Ventilation [ ] Non-invasive ventilatory support [ ] High-Flow  [ ] Acute  [ ] Chronic [ ] Hypoxic  [ ] Hypercarbic [ ] Other  [ ] Other organ failure     LABS:                                      7.8    13.09 )-----------( 326      ( 21 Aug 2020 06:30 )             25.2     08-21    135  |  102  |  8   ----------------------------<  95  3.9   |  26  |  0.87    Ca    8.8      21 Aug 2020 06:30  Phos  2.2     08-21  Mg     1.8     08-21    TPro  5.4<L>  /  Alb  2.2<L>  /  TBili  0.5  /  DBili  x   /  AST  27  /  ALT  18  /  AlkPhos  122<H>  08-20          LIVER FUNCTIONS - ( 20 Aug 2020 06:35 )  Alb: 2.2 g/dL / Pro: 5.4 g/dL / ALK PHOS: 122 U/L / ALT: 18 U/L / AST: 27 U/L / GGT: x                 RADIOLOGY & ADDITIONAL STUDIES:     EXAM:  CT ABDOMEN AND PELVIS IC                          PROCEDURE DATE:  08/18/2020      INTERPRETATION:  CLINICAL INFORMATION: Leukocytosis with right lower quadrant and right lower extremity pain. Evaluate right UVJ dilation.    COMPARISON: CT abdomen pelvis from 3/5/2019    PROCEDURE:  CT of the Abdomen and Pelvis was performed with intravenous contrast.  Intravenous contrast: 90 ml Omnipaque 350. 10 ml discarded.  Oral contrast: None.  Sagittal and coronal reformats were performed.    FINDINGS:  LOWER CHEST: Coronary artery calcifications. Peripherally calcified bilateral breast implants.  There is discontinuity of the medial aspect of the right implant with apparent extruded material in the medial right breast. Trace bilateralpleural effusions with associated atelectasis.    LIVER: Hepatomegaly. Redemonstration of an 8 mm left lobe cyst, unchanged (2:27).  BILE DUCTS: Normal caliber.  GALLBLADDER: Within normal limits.  SPLEEN: Within normal limits.  PANCREAS: Within normal limits.  ADRENALS: Within normal limits.  KIDNEYS/URETERS: Markedly distended right renal collecting system and right renal pelvis, significantly changed since 3/5/2019. There is associated marked cortical atrophy of the right kidney.. Left kidney is within normal limits no left hydroureteronephrosis.    BLADDER: Tiny droplet of gas within the urinary bladder. Correlation with any recent instrumentation is recommended.  REPRODUCTIVE ORGANS: Redemonstration of a 3.4 cm x 2.8 cm right adnexal cyst (2:85).    BOWEL: No bowel obstruction. Colonic diverticulosis.  PERITONEUM: No ascites.  VESSELS: Atherosclerotic changes.  RETROPERITONEUM/LYMPH NODES: No lymphadenopathy.  ABDOMINAL WALL: Within normal limits.  BONES: Degenerative changes. Grade 1 anterolisthesis of L5 with respect to S1. Status post left hip replacement. Healed right sacral fracture. Chronic fracture of the left inferior pubic ramus.    IMPRESSION:    Marked right hydronephrosis with dilatation of the right renal pelvis, significantly increased since 3/5/2019. There is associated right renal cortical atrophy. The level of the obstruction appears to be at the ureteropelvic junction. An underlying mass cannot be excluded.    Cystic right adnexal mass, unchanged.    Apparent medial rupture of the partially imaged right breast implant.    Findings were discussed with Dr. Botello on  8/18/2020 at 11:44 AM by Dr. Rey with read back confirmation.      MARTY ISAAC M.D., RESIDENT RADIOLOGIST  This document has been electronically signed.  FREDDY REY M.D., ATTENDING RADIOLOGIST  This document has been electronically signed. Aug 18 2020 11:53AM      PROTEIN CALORIE MALNUTRITION:   [x] PPSV2 < or = 30% [] significant weight loss [] poor nutritional intake [] anasarca [] catabolic state   Albumin, Serum: 2.2 g/dL (08-20-20 @ 06:35)   Artificial Nutrition []     REFERRALS:   []Chaplaincy  []Hospice  []Child Life  [x]Social Work  []Case management []Holistic Therapy [x]Physical Therapy []Dietary   Goals of Care Document:   Care Coordination Document: Care Coordination Assessment 201 [C. Provider] (08-18-20 @ 12:10)                              Care Coordination Assessment 201    COGNITIVE/LEARNING 201  Mental Status    Answers: Confused,  Answers: Unable to assess    Notes: Mental Status    Notes: Patient has AMS s/p UTI, hx of Dementia.    ADMISSION HISTORY 201  Admitted From    Answers: Acute Hospital    Functional Status Prior to Admission    Answers: Assistive equipment,  Answers: Assistive person    Notes: Functional Status Prior to Admission    Notes: cane, live-in aide    Services Present on Admission    Answers: DME; specify cane    Notes: Services on Admission, Contacts    Notes: Live-in aide    FINANCIAL 201  Does patient have financial concerns for discharge?    Answers: None    LIVING ARRANGEMENTS/SUPPORT 201  Housing Environment    Answers: House,  Answers: Stairs, number of steps 6steps outside/8steps inside    Living Arrangements    Answers: Lives with family    Sources of support/caregivers    Answers: Children,  Answers: Hired Care    Notes: Sources of support    Notes: Son-Yoandy (Emergency contact/HCP 2nd agent) Dtr Sandra Beauchamp (HCP)    CAREGIVER CONTACT 201  Does the patient wish to identify a Caregiver?    Answers: Unable to assess    EMERGENCY CONTACTS OUTSIDE HOME 201  Emergency Contact    Answers: Emergency Contact Name Yoandy Kolb,  Answers: Emergency Contact Phone # [phone],  Answers: Emergency Contact Relationship son    Notes: Emergency Contact:    Notes: Sandra Beauchamp-[phone]    DISCHARGE PLANNING 201  Potential Discharge Plan and Services    Answers: Anticipated Needs Unclear at Present    SCREENING 201  Social Work Screen and Referral    Answers: Adjustment to Illness/Difficulty Coping,  Answers: Major Illness Causing Lifestyles Changes    SUMMARY 201  Initial Clinical Summary    Notes: Social work reviewed medical chart of patient on PCU for psychosocial  assessment and support. As per H&P, patient is a  87-year-old , English  speaking female with HTN, dementia, recent hospitalization at [facility] found to  have L pubic ramus fracture, R sacral fracture, and R UPJ dilation, sent by.  Dr. Hood for abdominal pain and recent decline in functional status. As per  outpatient provider Dr. Hood, pt had a vertebral compression fracture 1 year  ago with no loss of function at that time. However one month ago, patient  developed abdominal pain and decline in functional status.  Pt was seen at  [facility] and diagnosed with sciatica and sent home. On review of imaging, pt  found to have L pubic ramus fracture, R sacral fracture, and R UPJ dilation  size of grapefruit due to obstruction with R kidney chronically shriveled. Dr. Hood spoke to Urology who advised against surgery given pt's chronically  shriveled R kidney; family also opting for more conservative management but  would like to address any reversible causes of pain. During that time, labs and  additional imaging reported to be WNL.     As per son, pt has been having decreased PO intake for the last month along  with loss of function. Pt found to have UTI outpatient, currently on cipro day  6/7; son also reports that pt was getting PPX antibiotics 3 times a week prior.  Pt has had abdominal pain for which they were treating with Tylenol with workup  noted above. Also reports that pt has appeared more dyspneic lately. No melena,  hematochezia, cough, diarrhea, constipation, fever.     Patient unable to participate in assessment s/p dementia, AMS s/p UTI. Patient  unable to identify caregiver. LMSW spoke with patient's son Yoandy to introduce  self and explain role. Patient's son confirmed demographics. Patient's son  Yoandy [phone] reports being emergency contact and secondary HCP. Yoandy  identified his sister Sandra Beauchamp [phone] as primary health care proxy.  LMSW request copy of HCP for medical chart. Patient is a  with 4 children  (Son-Yoandy lives in [place] and 3 others lives in [place]).     Patient resides with son Yoandy and his children in a private house with 6steps  to enter and 8steps inside. Patient's son reports that prior to 6weeks ago  patient was more independent, active, able to climb steps inside and willing to  participate in ADLs, however recently becoming more dependent and refusing to  shower as well as disoriented and confused. Patient went to [facility]  informed having UTI and Kidney mass, transferred to [facility] for further medical  management for decline functional status. Patient requires assistance with  ADLs. Patient has a private hire live-in aide for the last 14years, aide at  bedside and will stay over to provide ongoing social support. Patient has cane  to assist with balance, gait. Patient's PCP is Dr. Lynda Hood [phone] or  [phone]. Patient receives her medications from [facility].  Patient's son drives patient to medical appts.    Anticipated Discharge Plan and Services    Notes: Patient's discharge needs are uncleared at present and will be assessed  when appropriate. Pastoral care remains available. LMSW assured availability  and provide contact number. LMSW remains available for ongoing needs.       Electronically signed by:  Fay Lock  Electronically signed on:  2020-08-18  12:09 GAP TEAM PALLIATIVE CARE UNIT PROGRESS NOTE:      [] Patient on hospice program.    INDICATION FOR PALLIATIVE CARE UNIT SERVICES: Symptom Control, workup for abdominal pain and leukocytosis    INTERVAL HPI/OVERNIGHT EVENTS: No acute events overnight. Pt feels well and denies any pain. Had 1x BM. No pain medications used in the last 24 hours. Pt is on her third day of Zosyn. Will have repeat CT A/P with contrast today.     DNR on chart: Yes  Yes    Allergies    No Known Allergies    Intolerances    MEDICATIONS  (STANDING):  donepezil 10 milliGRAM(s) Oral at bedtime  escitalopram 5 milliGRAM(s) Oral daily  ferrous    sulfate 325 milliGRAM(s) Oral daily  heparin   Injectable 5000 Unit(s) SubCutaneous every 8 hours  lactated ringers. 1000 milliLiter(s) (100 mL/Hr) IV Continuous <Continuous>  melatonin 3 milliGRAM(s) Oral at bedtime  piperacillin/tazobactam IVPB.. 3.375 Gram(s) IV Intermittent every 8 hours  potassium chloride   Powder 40 milliEquivalent(s) Oral every 4 hours  senna 2 Tablet(s) Oral at bedtime    MEDICATIONS  (PRN):  acetaminophen   Tablet .. 650 milliGRAM(s) Oral every 6 hours PRN Mild Pain (1 - 3)  bisacodyl Suppository 10 milliGRAM(s) Rectal daily PRN Constipation  morphine  - Injectable 1 milliGRAM(s) IV Push every 4 hours PRN moderate to severe pain    ITEMS UNCHECKED ARE NOT PRESENT    PRESENT SYMPTOMS: [x]Unable to obtain due to poor mentation   Source if other than patient:  [x]Family   [x]Team     Pain: [ ] yes [x] no  QOL impact -   Location -           Aggravating factors -   Quality -  Radiation -   Timing -   Severity (0-10 scale):   Minimal acceptable level (0-10 scale):     Dyspnea:                           []Mild []Moderate []Severe  Anxiety:                             []Mild []Moderate []Severe  Fatigue:                             []Mild []Moderate []Severe  Nausea:                             []Mild []Moderate []Severe  Loss of appetite:              [x]Mild []Moderate []Severe  Constipation:                    []Mild []Moderate []Severe    PAINAD Score: 0  http://geriatrictoolkit.[facility]/cog/painad.pdf  		  Other Symptoms:  [x]All other review of systems negative     Karnofsky Performance Score/Palliative Performance Status Version 2:    40     %  http://[facility].org/files/news/palliative_performance_scale_ppsv2.pdf    PHYSICAL EXAM:  Vital Signs Last 24 Hrs  T(C): 36.7 (21 Aug 2020 07:29), Max: 36.7 (21 Aug 2020 07:29)  T(F): 98.1 (21 Aug 2020 07:29), Max: 98.1 (21 Aug 2020 07:29)  HR: 80 (21 Aug 2020 09:58) (78 - 82)  BP: 101/59 (21 Aug 2020 09:58) (101/59 - 158/65)  RR: 22 (21 Aug 2020 07:29) (18 - 22)  SpO2: 90% (21 Aug 2020 09:58) (90% - 92%)    I&O's Summary    20 Aug 2020 07:01  -  21 Aug 2020 07:00  --------------------------------------------------------  IN: 0 mL / OUT: 1100 mL / NET: -1100 mL    GENERAL:  [X]Alert  [X]Oriented x 1 (person)   []Lethargic  []Cachexia  []Unarousable  [X]Verbal  []Non-Verbal  Behavioral:   [] Anxiety  [] Delirium [] Agitation [] Other  HEENT:  []Normal   [X] Dry mouth   []ET Tube/Trach  []Oral lesions  PULMONARY:   [x]Clear []Tachypnea  []Audible excessive secretions   []Rhonchi        []Right []Left []Bilateral  []Crackles        []Right []Left []Bilateral  []Wheezing     []Right []Left []Bilateral  CARDIOVASCULAR:    [x]Regular []Irregular []Tachy  []Justyn []Murmur []Other  GASTROINTESTINAL:  [x]Soft []Distended [x]+BS [x]Non tender []Tender []PEG []OGT/ NGT   Last BM: 8/21 yesterday evening    GENITOURINARY: R nephrostomy tube, 150 cc brown purulent fluid noted   []Normal [] Incontinent   []Oliguria/Anuria   [x]Roth acute urinary retention  MUSCULOSKELETAL:   []Normal  [X]Weakness  [x]Bed/Wheelchair bound []Edema  NEUROLOGIC:   []No focal deficits  [x]Cognitive impairment  []Dysphagia []Dysarthria []Paresis []Other   SKIN:   []Normal   []Pressure ulcer(s)  [x]Rash: Please refer to nursing notes for further documentation    CRITICAL CARE:  [ ] Shock Present  [ ]Septic [ ]Cardiogenic [ ]Neurologic [ ]Hypovolemic  [ ]  Vasopressors [ ]  Inotropes   [ ] Respiratory failure present [ ] Mechanical Ventilation [ ] Non-invasive ventilatory support [ ] High-Flow  [ ] Acute  [ ] Chronic [ ] Hypoxic  [ ] Hypercarbic [ ] Other  [ ] Other organ failure     LABS:                         7.8    13.09 )-----------( 326      ( 21 Aug 2020 06:30 )             25.2     08-21    135  |  102  |  8   ----------------------------<  95  3.9   |  26  |  0.87    Ca    8.8      21 Aug 2020 06:30  Phos  2.2     08-21  Mg     1.8     08-21    TPro  5.4<L>  /  Alb  2.2<L>  /  TBili  0.5  /  DBili  x   /  AST  27  /  ALT  18  /  AlkPhos  122<H>  08-20  LIVER FUNCTIONS - ( 20 Aug 2020 06:35 )  Alb: 2.2 g/dL / Pro: 5.4 g/dL / ALK PHOS: 122 U/L / ALT: 18 U/L / AST: 27 U/L / GGT: x           RADIOLOGY & ADDITIONAL STUDIES:     EXAM:  CT ABDOMEN AND PELVIS IC                          PROCEDURE DATE:  08/18/2020      INTERPRETATION:  CLINICAL INFORMATION: Leukocytosis with right lower quadrant and right lower extremity pain. Evaluate right UVJ dilation.    COMPARISON: CT abdomen pelvis from 3/5/2019    PROCEDURE:  CT of the Abdomen and Pelvis was performed with intravenous contrast.  Intravenous contrast: 90 ml Omnipaque 350. 10 ml discarded.  Oral contrast: None.  Sagittal and coronal reformats were performed.    FINDINGS:  LOWER CHEST: Coronary artery calcifications. Peripherally calcified bilateral breast implants.  There is discontinuity of the medial aspect of the right implant with apparent extruded material in the medial right breast. Trace bilateralpleural effusions with associated atelectasis.    LIVER: Hepatomegaly. Redemonstration of an 8 mm left lobe cyst, unchanged (2:27).  BILE DUCTS: Normal caliber.  GALLBLADDER: Within normal limits.  SPLEEN: Within normal limits.  PANCREAS: Within normal limits.  ADRENALS: Within normal limits.  KIDNEYS/URETERS: Markedly distended right renal collecting system and right renal pelvis, significantly changed since 3/5/2019. There is associated marked cortical atrophy of the right kidney.. Left kidney is within normal limits no left hydroureteronephrosis.    BLADDER: Tiny droplet of gas within the urinary bladder. Correlation with any recent instrumentation is recommended.  REPRODUCTIVE ORGANS: Redemonstration of a 3.4 cm x 2.8 cm right adnexal cyst (2:85).    BOWEL: No bowel obstruction. Colonic diverticulosis.  PERITONEUM: No ascites.  VESSELS: Atherosclerotic changes.  RETROPERITONEUM/LYMPH NODES: No lymphadenopathy.  ABDOMINAL WALL: Within normal limits.  BONES: Degenerative changes. Grade 1 anterolisthesis of L5 with respect to S1. Status post left hip replacement. Healed right sacral fracture. Chronic fracture of the left inferior pubic ramus.    IMPRESSION:    Marked right hydronephrosis with dilatation of the right renal pelvis, significantly increased since 3/5/2019. There is associated right renal cortical atrophy. The level of the obstruction appears to be at the ureteropelvic junction. An underlying mass cannot be excluded.    Cystic right adnexal mass, unchanged.    Apparent medial rupture of the partially imaged right breast implant.    Findings were discussed with Dr. Botello on  8/18/2020 at 11:44 AM by Dr. Rey with read back confirmation.      MARTY ISAAC M.D., RESIDENT RADIOLOGIST  This document has been electronically signed.  FREDDY REY M.D., ATTENDING RADIOLOGIST  This document has been electronically signed. Aug 18 2020 11:53AM      PROTEIN CALORIE MALNUTRITION:   [x] PPSV2 < or = 30% [] significant weight loss [] poor nutritional intake [] anasarca [] catabolic state   Albumin, Serum: 2.2 g/dL (08-20-20 @ 06:35)   Artificial Nutrition []     REFERRALS:   []Chaplaincy  []Hospice  []Child Life  [x]Social Work  []Case management []Holistic Therapy [x]Physical Therapy []Dietary   Goals of Care Document:   Care Coordination Document: Care Coordination Assessment 201 [C. Provider] (08-18-20 @ 12:10)                              Care Coordination Assessment 201    COGNITIVE/LEARNING 201  Mental Status    Answers: Confused,  Answers: Unable to assess    Notes: Mental Status    Notes: Patient has AMS s/p UTI, hx of Dementia.    ADMISSION HISTORY 201  Admitted From    Answers: Acute Hospital    Functional Status Prior to Admission    Answers: Assistive equipment,  Answers: Assistive person    Notes: Functional Status Prior to Admission    Notes: cane, live-in aide    Services Present on Admission    Answers: DME; specify cane    Notes: Services on Admission, Contacts    Notes: Live-in aide    FINANCIAL 201  Does patient have financial concerns for discharge?    Answers: None    LIVING ARRANGEMENTS/SUPPORT 201  Housing Environment    Answers: House,  Answers: Stairs, number of steps 6steps outside/8steps inside    Living Arrangements    Answers: Lives with family    Sources of support/caregivers    Answers: Children,  Answers: Hired Care    Notes: Sources of support    Notes: Son-Yoandy (Emergency contact/HCP 2nd agent) Dtr Sandra Beauchamp (HCP)    CAREGIVER CONTACT 201  Does the patient wish to identify a Caregiver?    Answers: Unable to assess    EMERGENCY CONTACTS OUTSIDE HOME 201  Emergency Contact    Answers: Emergency Contact Name Yoandy Kolb,  Answers: Emergency Contact Phone # [phone],  Answers: Emergency Contact Relationship son    Notes: Emergency Contact:    Notes: Sandra Beauchamp-[phone]    DISCHARGE PLANNING 201  Potential Discharge Plan and Services    Answers: Anticipated Needs Unclear at Present    SCREENING 201  Social Work Screen and Referral    Answers: Adjustment to Illness/Difficulty Coping,  Answers: Major Illness Causing Lifestyles Changes    SUMMARY 201  Initial Clinical Summary    Notes: Social work reviewed medical chart of patient on PCU for psychosocial  assessment and support. As per H&P, patient is a  87-year-old , English  speaking female with HTN, dementia, recent hospitalization at [place] found to  have L pubic ramus fracture, R sacral fracture, and R UPJ dilation, sent by.  Dr. Hood for abdominal pain and recent decline in functional status. As per  outpatient provider Dr. Hood, pt had a vertebral compression fracture 1 year  ago with no loss of function at that time. However one month ago, patient  developed abdominal pain and decline in functional status.  Pt was seen at  [facility] and diagnosed with sciatica and sent home. On review of imaging, pt  found to have L pubic ramus fracture, R sacral fracture, and R UPJ dilation  size of grapefruit due to obstruction with R kidney chronically shriveled. Dr. Hood spoke to Urology who advised against surgery given pt's chronically  shriveled R kidney; family also opting for more conservative management but  would like to address any reversible causes of pain. During that time, labs and  additional imaging reported to be WNL.     As per son, pt has been having decreased PO intake for the last month along  with loss of function. Pt found to have UTI outpatient, currently on cipro day  6/7; son also reports that pt was getting PPX antibiotics 3 times a week prior.  Pt has had abdominal pain for which they were treating with Tylenol with workup  noted above. Also reports that pt has appeared more dyspneic lately. No melena,  hematochezia, cough, diarrhea, constipation, fever.     Patient unable to participate in assessment s/p dementia, AMS s/p UTI. Patient  unable to identify caregiver. LMSW spoke with patient's son Yoandy to introduce  self and explain role. Patient's son confirmed demographics. Patient's son  Yoandy [phone] reports being emergency contact and secondary HCP. Yoandy  identified his sister Sandra Beauchamp [phone] as primary health care proxy.  LMSW request copy of HCP for medical chart. Patient is a  with 4 children  (Son-Yoandy lives in [place] and 3 others lives in [place]).     Patient resides with son Yoandy and his children in a private house with 6steps  to enter and 8steps inside. Patient's son reports that prior to 6weeks ago  patient was more independent, active, able to climb steps inside and willing to  participate in ADLs, however recently becoming more dependent and refusing to  shower as well as disoriented and confused. Patient went to [facility]  informed having UTI and Kidney mass, transferred to [facility] for further medical  management for decline functional status. Patient requires assistance with  ADLs. Patient has a private hire live-in aide for the last 14years, aide at  bedside and will stay over to provide ongoing social support. Patient has cane  to assist with balance, gait. Patient's PCP is Dr. Lynda Hood [phone] or  [phone]. Patient receives her medications from [facility].  Patient's son drives patient to medical appts.    Anticipated Discharge Plan and Services    Notes: Patient's discharge needs are uncleared at present and will be assessed  when appropriate. Pastoral care remains available. LMSW assured availability  and provide contact number. LMSW remains available for ongoing needs.       Electronically signed by:  Fay Lock  Electronically signed on:  2020-08-18  12:09

## 2020-08-21 NOTE — DIETITIAN INITIAL EVALUATION ADULT. - OTHER INFO
Diet PTA: Per daughter pt with decreased appetite/ intake for the past 1.5 months since the beginning of July. Daughter stated pt was previously able to eat her full meal when put in front of her despite stating she was not hungry, recently pt has been taking 3 meals per day consisting of a variety of regular foods however smaller portions. Estimates consuming <50% baseline intake during this time. Pt has been supplementing with ensure shakes 1-1.5 daily dependent on how pt feels. Pt does not follow any formal dietary restrictions. Pt lives with her son and has an aide as well.     Weight: Unsure of UBW, unsure if change in fit of clothes, stated pt appears the same. RD attempted to obtain weight via bed scale however unable, read out had negative weight-discussed with RN. Obtain weight via zeroed bed scale as feasible, per daughter pt has not been able to get out of bed recently.     Pt with no food allergies, takes a multivitamin, vitamin D, and B vitamin but unsure which-no vitamins noted per outpatient med list. PT with no N+V, per daughter pt has had recent loose stool which she attributes to antibiotics, noted last BM today, pt noted to be incontinent. Pt denies difficulty or discomfort chewing/swallowing, per daughter pt's teeth have been declining and recently pulled out a front tooth-pending dental consult.     Diet education: RD reviewed high calorie, high protein nutrition therapy to optimize nutrition and increase calorie and protein density of foods, consuming smaller, more frequent meals. RD also reviewed binding foods in setting of loose stool.

## 2020-08-21 NOTE — PHYSICAL THERAPY INITIAL EVALUATION ADULT - PERTINENT HX OF CURRENT PROBLEM, REHAB EVAL
Pt is a 87 y.o. female with HTN, dementia, recent hospitalization at [place] found to have L pubic ramus fracture, R sacral fracture, and R UPJ dilation, recent UTI, presenting with abdominal pain and decline in functional status over the last month. Found to have leukocytosis 17.7. Pt admitted for management of pain and sepsis s/p IR drainage of 1.4 L of pus from dilated ureter.

## 2020-08-21 NOTE — PROGRESS NOTE ADULT - PROBLEM SELECTOR PLAN 2
- Pt w/ recent imaging at [place] found to have L pubic ramus fracture, R sacral fracture, and R UPJ dilation size of grapefruit due to obstruction with R kidney chronically shriveled.  Dr. Hood spoke to Urology, no surgical intervention currently being planned due to shriveled kidney state   - CT 8/18/20 here showed further increased right renal collecting system and right renal pelvis size  - Urology consulted, pt not a candidate for nephrectomy.   - s/p R nephrostomy tube by IR 8/19    - Family interested in more conservative management but would like to address any reversible causes of pain  - Continue Tylenol PRN for mild pain, Morphine 1 mg q4 PRN for moderate-severe pain - Pt w/ recent imaging at [place] found to have L pubic ramus fracture, R sacral fracture, and R UPJ dilation size of grapefruit due to obstruction with R kidney chronically shriveled.  Dr. Hood spoke to Urology, no surgical intervention currently being planned due to shriveled kidney state   - CT 8/18/20 here showed further increased right renal collecting system and right renal pelvis size  - Urology consulted, pt not a candidate for nephrectomy.   - s/p R nephrostomy tube by IR 8/19. Nephrostomy tube draining ~150 ml.   - Family interested in more conservative management but would like to address any reversible causes of pain  - Continue Tylenol PRN for mild pain, Morphine 1 mg q4 PRN for moderate-severe pain  - Repeat CT Abd w/ contrast to assess for retained collections or other abnormalities. - Pt w/ recent imaging at [place] found to have L pubic ramus fracture, R sacral fracture, and R UPJ dilation size of grapefruit due to obstruction with R kidney chronically shriveled.  Dr. Hood spoke to Urology, no surgical intervention currently being planned due to shriveled kidney state   - CT 8/18/20 here showed further increased right renal collecting system and right renal pelvis size  - Urology consulted, pt not a candidate for nephrectomy.   - s/p R nephrostomy tube by IR 8/19. Nephrostomy tube draining ~150 ml.   - Continue Tylenol PRN for mild pain, Morphine 1 mg q4 PRN for moderate-severe pain  - Repeat CT Abd w/ contrast to assess for retained collections or other abnormalities.

## 2020-08-21 NOTE — PROGRESS NOTE ADULT - PROBLEM SELECTOR PLAN 1
- No longer meets SIRS criteria. HR down to the 70's from 90s, leukocytosis to 13.1, mildly elevated procalcitonin 0.29.   - s/p R nephrostomy tube with 1400 cc purulent drainage from R kidney mass 8/19   - Ucx from nephrostomy showed moderate E. Coli. On zosyn currently.   - CXR clear lungs, COVID negative    - On Zosyn 3.375 mg q8 (8/18 - ).  - S/p IVF  - ID consulted, recs appreciated. On Zosyn   - Discussion with daughter, Diamond at bedside regarding potential need for MICU if patient were to require pressors for hypotension. She will discuss with siblings but it seems like family leaning toward staying in PCU and does not want to pursue aggressive treatment - No longer meets SIRS criteria. HR down to the 70's from 90s, leukocytosis to 13.1, mildly elevated procalcitonin 0.29.   - s/p R nephrostomy tube with 1400 cc purulent drainage from R kidney mass 8/19   - Ucx from nephrostomy showed moderate E. Coli. On zosyn currently.   - CXR clear lungs, COVID negative    - On Zosyn 3.375 mg q8 (8/18 - ).  - S/p IVF  - ID consulted, recs appreciated. On Zosyn   - Repeat CT Abd w/ contrast to assess for retained collections or other abnormalities.  - Dental consult called for possible oral etiology of infection. - s/p R nephrostomy tube with 1400 cc purulent drainage from R kidney mass 8/19   - Ucx from nephrostomy showed moderate E. Coli. On zosyn currently.   - CXR clear lungs, COVID negative    - On Zosyn 3.375 mg q8 (8/18 - ). Awaiting sensitivities  - ID consulted, recs appreciated. On Zosyn   - Repeat CT Abd w/ contrast to assess for retained collections or other abnormalities.  - Dental consult called for possible oral etiology of infection. - s/p R nephrostomy tube with 1400 cc purulent drainage from R kidney mass 8/19   - Ucx from nephrostomy showed moderate E. Coli. On zosyn currently.  Pending sensitivities with goal to narrow the spectrum  - CXR clear lungs, COVID negative    - On Zosyn 3.375 mg q8 (8/18 - ). Awaiting sensitivities  - ID consulted, recs appreciated. On Zosyn   - Repeat CT Abd w/ contrast to assess for retained collections or other abnormalities.  - Dental consult called for possible oral etiology of infection.

## 2020-08-21 NOTE — PHYSICAL THERAPY INITIAL EVALUATION ADULT - PLANNED THERAPY INTERVENTIONS, PT EVAL
balance training/bed mobility training/gait training/strengthening/GOAL: Stair Negotiation Training: Patient will be able to negotiate up & down 1 flight of stairs with unilateral rail, step to gait pattern, in 4 weeks./transfer training

## 2020-08-21 NOTE — PROGRESS NOTE ADULT - ASSESSMENT
This is a 87-year-old female with HTN, dementia, recent hospitalization at [place] found to have L pubic ramus fracture, R sacral fracture, and R UPJ dilation, recent UTI, presenting with abdominal pain and decline in functional status over the last month. Found to have leukocytosis 17.7. Pt admitted for management of pain and sepsis s/p IR drainage of 1.4 L of pus from dilated ureter.

## 2020-08-21 NOTE — PROGRESS NOTE ADULT - PROBLEM SELECTOR PLAN 4
- Hb 7.1 MCV 92 today. Hb downtrending further from 7.5 yesterday. Likely multifactorial anemia due to inflammation and dilution from IV fluids. Iron studies show decreased TIBC, low iron. Will start ferrous sulfate 325 daily    - No evidence of bleeding, but will assess guaiac.   - Monitor CBC q12   - Folate, B12, TSH WNL   - Maintain active T&S - Hb 7.8 MCV 92 today. Hb uptrending from low 7.1. Likely multifactorial anemia due to inflammation and dilution from IV fluids. Iron studies show decreased TIBC, low iron. On ferrous sulfate 325   - No evidence of bleeding, but will assess guaiac.   - Monitor CBC q12   - Folate, B12, TSH WNL   - Maintain active T&S - Hb 7.8 MCV 92 today. Hb uptrending from low 7.1. Likely multifactorial anemia due to inflammation and dilution from IV fluids. Iron studies show decreased TIBC, low iron. On ferrous sulfate 325   - FOBT positive  - Monitor CBC q12   - Folate, B12, TSH WNL   - Maintain active T&S - Hb 7.8 MCV 92 today. Hb uptrending from low 7.1. Likely multifactorial anemia due to inflammation and dilution from IV fluids. Iron studies show decreased TIBC, low iron. On ferrous sulfate 325   - FOBT positive  - Monitor AM CBC. Transfuse if hgb <7.   - Folate, B12, TSH WNL   - Maintain active T&S fro 8/20

## 2020-08-21 NOTE — DIETITIAN INITIAL EVALUATION ADULT. - PROBLEM SELECTOR PLAN 1
- Pt w/ recent imaging at [place] found to have L pubic ramus fracture, R sacral fracture, and R UPJ dilation size of grapefruit due to obstruction with R kidney chronically shriveled Dr. Hood spoke to Urology, no surgical intervention currently being planned due to shriveled kidney state   - Family interested in more conservative management but would like to address any reversible causes of pain  - F/u CBC, CMP. Consider abdominal imaging pending Cr

## 2020-08-21 NOTE — CHART NOTE - NSCHARTNOTEFT_GEN_A_CORE
Sensitivities noted, will narrow to Ceftriaxone 1GM IV q24h.   Spoke with primary team.     Julio Rea MD   Infectious Disease   Pager [phone]   After 5PM and on weekends please page fellow on call or call [phone]

## 2020-08-21 NOTE — DIETITIAN INITIAL EVALUATION ADULT. - ADD RECOMMEND
1. Continue to encourage po intake and obtain/honor food preferences as able-will provide chocolate ice cream for lunch and dinner, 3. Monitor PO intake, diet tolerance, weight trends, labs, and skin integrity

## 2020-08-21 NOTE — PHYSICAL THERAPY INITIAL EVALUATION ADULT - ADDITIONAL COMMENTS
As per care coordination notes, patient resides with son Yoandy and his children in a private house with 6 steps to enter and 8 steps inside. Prior to 6weeks ago patient was more independent, active, able to climb steps inside and willing to participate in ADLs, however recently becoming more dependent. Patient requires assistance with ADLs. Patient has a private hire live-in aide for the last 14years, aide at bedside and will stay over to provide ongoing social support. Patient has cane to assist with balance, gait.

## 2020-08-21 NOTE — PROGRESS NOTE ADULT - PROBLEM SELECTOR PLAN 8
Diet: soft, will add ensure, nutrition consult   DVT PPX: heparin SQ   GOC: DNR/DNI   Dispo: pending clinical improvement Diet: soft, ensure added, nutrition consult   DVT PPX: heparin SQ   GOC: DNR/DNI   Dispo: pending clinical improvement

## 2020-08-21 NOTE — DIETITIAN INITIAL EVALUATION ADULT. - PHYSICAL APPEARANCE
other (specify)/Physical exam deferred at this time, pt in the process of eating lunch Ht: 5'0", IBW: 100 lbs, No weight available, unable to calculate BMI or % IBW  No edema, Skin intact per nursing flowsheets

## 2020-08-21 NOTE — PROGRESS NOTE ADULT - SUBJECTIVE AND OBJECTIVE BOX
pt seen 8/21/2020 at TIME- 13:50     Interventional Radiology    S/P right nephrostomy tube placement on 8/19/2020 by IR Dr. Roberth Olmstead, POD # 2.     Pt is confused and unable to answer questions.    Vital Signs Last 24 Hrs  T(C): 36.4 (21 Aug 2020 16:47), Max: 36.7 (21 Aug 2020 07:29)  T(F): 97.6 (21 Aug 2020 16:47), Max: 98.1 (21 Aug 2020 07:29)  HR: 76 (21 Aug 2020 16:47) (76 - 82)  BP: 112/66 (21 Aug 2020 16:47) (101/59 - 158/65)  BP(mean): --  RR: 24 (21 Aug 2020 16:47) (18 - 24)  SpO2: 91% (21 Aug 2020 16:47) (90% - 92%)    General Appearance: NAD, seen at bedside with covering PCU RN and daughter present.  Pt is comfortably laying in bed.    Procedure Site (Right flank): catheter intact and draining urine to leg bag.  flushed without difficulty.  draining cloudy light brown urine to leg bag.    I&O's Detail    20 Aug 2020 07:01  -  21 Aug 2020 07:00  --------------------------------------------------------  IN:  Total IN: 0 mL    OUT:    Indwelling Catheter - Urethral: 950 mL    Nephrostomy Tube: 150 mL  Total OUT: 1100 mL    Total NET: -1100 mL      21 Aug 2020 07:01  -  21 Aug 2020 18:58  --------------------------------------------------------  IN:  Total IN: 0 mL    OUT:    Indwelling Catheter - Urethral: 250 mL    Nephrostomy Tube: 150 mL  Total OUT: 400 mL    Total NET: -400 mL    LABS:                        7.8    13.09 )-----------( 326      ( 21 Aug 2020 06:30 )             25.2     08-21    135  |  102  |  8   ----------------------------<  95  3.9   |  26  |  0.87    Ca    8.8      21 Aug 2020 06:30  Phos  2.2     08-21  Mg     1.8     08-21    TPro  5.4<L>  /  Alb  2.2<L>  /  TBili  0.5  /  DBili  x   /  AST  27  /  ALT  18  /  AlkPhos  122<H>  08-20    Culture - Urine (08.19.20 @ 22:05)    -  Amikacin: S <=16    -  Amoxicillin/Clavulanic Acid: S <=8/4    -  Ampicillin: R >16 These ampicillin results predict results for amoxicillin    -  Ampicillin/Sulbactam: R >16/8 Enterobacter, Citrobacter, and Serratia may develop resistance during prolonged therapy (3-4 days)    -  Aztreonam: S <=4    -  Cefazolin: R >16 (MIC_CL_COM_ENTERIC_CEFAZU) For uncomplicated UTI with K. pneumoniae, E. coli, or P. mirablis: RUSTY <=16 is sensitive and RUSTY >=32 is resistant. This also predicts results for oral agents cefaclor, cefdinir, cefpodoxime, cefprozil, cefuroxime axetil, cephalexin and locarbef for uncomplicated UTI. Note that some isolates may be susceptible to these agents while testing resistant to cefazolin.    -  Cefepime: S <=2    -  Cefoxitin: S <=8    -  Ceftriaxone: S <=1 Enterobacter, Citrobacter, and Serratia may develop resistance during prolonged therapy    -  Ciprofloxacin: S <=0.25    -  Ertapenem: S <=0.5    -  Gentamicin: S <=2    -  Imipenem: S <=1    -  Levofloxacin: S <=0.5    -  Meropenem: S <=1    -  Nitrofurantoin: S <=32 Should not be used to treat pyelonephritis    -  Piperacillin/Tazobactam: S <=8    -  Tigecycline: S <=2    -  Tobramycin: S <=2    -  Trimethoprim/Sulfamethoxazole: S <=0.5/9.5    Specimen Source: .Urine Nephrostomy - Right    Culture Results:   Moderate Escherichia coli    Organism Identification: Escherichia coli    Organism: Escherichia coli    Method Type: RUSTY

## 2020-08-21 NOTE — PROGRESS NOTE ADULT - ASSESSMENT
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 Roberth Olmstead.    Continue global care/ comfort care as per primary team/PCU.    Forward flush the Right nephrostomy tube as ordered, Maintain dressing over the site.  Monitor and record the daily right nephrostomy outputs.  Antibiotics per primary team.    Regarding nephrostomy tube follow up with IR an IR contact information business card was give to the patient's daughter and she was instructed that her mother should have routine exchange of the Right nephrostomy tube every 3 months.  IF patient is d/c with the right nephrostomy tube she will need to continue the same nephrostomy care.    Vinnie Mccann, RPA-C  [facility] [id number]  Ext [phone]

## 2020-08-21 NOTE — DIETITIAN INITIAL EVALUATION ADULT. - REASON INDICATOR FOR ASSESSMENT
Pt seen for nutrition consult for assessment. Pt with PMH including dementia, HTN, recent hospitalization for L pubic ramus and R sacral fracture, R UPT dilation admitted with abdominal pain found to have sepsis S/P IR drain 1.4 L purulent urine from dilated ureter and R nephrostomy placement.     Information obtained from daughter Lizzie at bedside, pt with dementia.

## 2020-08-22 DIAGNOSIS — M19.90 UNSPECIFIED OSTEOARTHRITIS, UNSPECIFIED SITE: ICD-10-CM

## 2020-08-22 LAB
ANION GAP SERPL CALC-SCNC: 12 MMOL/L — SIGNIFICANT CHANGE UP (ref 5–17)
BUN SERPL-MCNC: 7 MG/DL — SIGNIFICANT CHANGE UP (ref 7–23)
CALCIUM SERPL-MCNC: 8.8 MG/DL — SIGNIFICANT CHANGE UP (ref 8.4–10.5)
CHLORIDE SERPL-SCNC: 101 MMOL/L — SIGNIFICANT CHANGE UP (ref 96–108)
CO2 SERPL-SCNC: 23 MMOL/L — SIGNIFICANT CHANGE UP (ref 22–31)
CREAT SERPL-MCNC: 0.96 MG/DL — SIGNIFICANT CHANGE UP (ref 0.5–1.3)
GLUCOSE SERPL-MCNC: 88 MG/DL — SIGNIFICANT CHANGE UP (ref 70–99)
HCT VFR BLD CALC: 26.1 % — LOW (ref 34.5–45)
HGB BLD-MCNC: 8 G/DL — LOW (ref 11.5–15.5)
MCHC RBC-ENTMCNC: 28.1 PG — SIGNIFICANT CHANGE UP (ref 27–34)
MCHC RBC-ENTMCNC: 30.7 GM/DL — LOW (ref 32–36)
MCV RBC AUTO: 91.6 FL — SIGNIFICANT CHANGE UP (ref 80–100)
NRBC # BLD: 0 /100 WBCS — SIGNIFICANT CHANGE UP (ref 0–0)
PLATELET # BLD AUTO: 339 K/UL — SIGNIFICANT CHANGE UP (ref 150–400)
POTASSIUM SERPL-MCNC: 3.5 MMOL/L — SIGNIFICANT CHANGE UP (ref 3.5–5.3)
POTASSIUM SERPL-SCNC: 3.5 MMOL/L — SIGNIFICANT CHANGE UP (ref 3.5–5.3)
RBC # BLD: 2.85 M/UL — LOW (ref 3.8–5.2)
RBC # FLD: 15.9 % — HIGH (ref 10.3–14.5)
SODIUM SERPL-SCNC: 136 MMOL/L — SIGNIFICANT CHANGE UP (ref 135–145)
WBC # BLD: 11.55 K/UL — HIGH (ref 3.8–10.5)
WBC # FLD AUTO: 11.55 K/UL — HIGH (ref 3.8–10.5)

## 2020-08-22 PROCEDURE — 99233 SBSQ HOSP IP/OBS HIGH 50: CPT | Mod: GC

## 2020-08-22 RX ORDER — ACETAMINOPHEN 500 MG
975 TABLET ORAL EVERY 8 HOURS
Refills: 0 | Status: DISCONTINUED | OUTPATIENT
Start: 2020-08-22 | End: 2020-08-24

## 2020-08-22 RX ADMIN — HEPARIN SODIUM 5000 UNIT(S): 5000 INJECTION INTRAVENOUS; SUBCUTANEOUS at 16:13

## 2020-08-22 RX ADMIN — DONEPEZIL HYDROCHLORIDE 10 MILLIGRAM(S): 10 TABLET, FILM COATED ORAL at 21:10

## 2020-08-22 RX ADMIN — CEFTRIAXONE 100 MILLIGRAM(S): 500 INJECTION, POWDER, FOR SOLUTION INTRAMUSCULAR; INTRAVENOUS at 21:09

## 2020-08-22 RX ADMIN — HEPARIN SODIUM 5000 UNIT(S): 5000 INJECTION INTRAVENOUS; SUBCUTANEOUS at 08:32

## 2020-08-22 RX ADMIN — Medication 3 MILLIGRAM(S): at 21:10

## 2020-08-22 RX ADMIN — SENNA PLUS 2 TABLET(S): 8.6 TABLET ORAL at 21:10

## 2020-08-22 RX ADMIN — Medication 325 MILLIGRAM(S): at 12:15

## 2020-08-22 RX ADMIN — Medication 975 MILLIGRAM(S): at 13:46

## 2020-08-22 RX ADMIN — ESCITALOPRAM OXALATE 5 MILLIGRAM(S): 10 TABLET, FILM COATED ORAL at 12:14

## 2020-08-22 RX ADMIN — HEPARIN SODIUM 5000 UNIT(S): 5000 INJECTION INTRAVENOUS; SUBCUTANEOUS at 23:54

## 2020-08-22 RX ADMIN — HEPARIN SODIUM 5000 UNIT(S): 5000 INJECTION INTRAVENOUS; SUBCUTANEOUS at 00:28

## 2020-08-22 RX ADMIN — Medication 975 MILLIGRAM(S): at 21:09

## 2020-08-22 NOTE — PROGRESS NOTE ADULT - ASSESSMENT
This is a 87-year-old female with HTN, dementia, recent hospitalization at [place] found to have L pubic ramus fracture, R sacral fracture, and R UPJ dilation, recent UTI, presenting with abdominal pain and decline in functional status over the last month. Found to have leukocytosis 17.7. Pt admitted for management of pain and sepsis s/p IR drainage of 1.4 L of pus from dilated ureter.

## 2020-08-22 NOTE — PROGRESS NOTE ADULT - SUBJECTIVE AND OBJECTIVE BOX
GAP TEAM PALLIATIVE CARE UNIT PROGRESS NOTE:      [  ] Patient on hospice program.    INDICATION FOR PALLIATIVE CARE UNIT SERVICES: Symptom Control, workup for abdominal pain and leukocytosis    INTERVAL HPI/OVERNIGHT EVENTS: Patient received 1 tylenol prn for pain in past 24 hours (9 pm). Patient denies any pain presently. Had two bowel movements yesterday. No nausea, vomiting, diarrhea.     DNR on chart: Yes  Yes    Allergies    No Known Allergies    Intolerances    MEDICATIONS  (STANDING):  acetaminophen   Tablet .. 975 milliGRAM(s) Oral every 8 hours  cefTRIAXone   IVPB 1000 milliGRAM(s) IV Intermittent every 24 hours  donepezil 10 milliGRAM(s) Oral at bedtime  escitalopram 5 milliGRAM(s) Oral daily  ferrous    sulfate 325 milliGRAM(s) Oral daily  heparin   Injectable 5000 Unit(s) SubCutaneous every 8 hours  melatonin 3 milliGRAM(s) Oral at bedtime  senna 2 Tablet(s) Oral at bedtime  sodium chloride 0.9%. 1000 milliLiter(s) (100 mL/Hr) IV Continuous <Continuous>    MEDICATIONS  (PRN):  bisacodyl Suppository 10 milliGRAM(s) Rectal daily PRN Constipation  morphine  - Injectable 1 milliGRAM(s) IV Push every 4 hours PRN moderate to severe pain  morphine  - Injectable 1 milliGRAM(s) IV Push every 4 hours PRN dyspnea    ITEMS UNCHECKED ARE NOT PRESENT    PRESENT SYMPTOMS: [ ]Unable to obtain due to poor mentation   Source if other than patient:  [ ]Family   [ ]Team     Pain: [ ] yes [ x] no  QOL impact -   Location -                    Aggravating factors -  Quality -  Radiation -  Timing-  Severity (0-10 scale):  Minimal acceptable level (0-10 scale):     Dyspnea:                           [ ]Mild [ ]Moderate [ ]Severe  Anxiety:                             [ ]Mild [ ]Moderate [ ]Severe  Fatigue:                             [ ]Mild [ ]Moderate [ ]Severe  Nausea:                             [ ]Mild [ ]Moderate [ ]Severe  Loss of appetite:              [x ]Mild [ ]Moderate [ ]Severe  Constipation:                    [ ]Mild [ ]Moderate [ ]Severe    PAINAD Score:    http://geriatrictoolkit.[place].[facility]/cog/painad.pdf (Ctrl +  left click to view)  		  Other Symptoms:  [ ]All other review of systems negative     Palliative Performance Status Version 2:  40       %         http://npcrc.org/files/news/palliative_performance_scale_ppsv2.pdf  PHYSICAL EXAM:  Vital Signs Last 24 Hrs  T(C): 36.4 (22 Aug 2020 06:00), Max: 36.4 (21 Aug 2020 16:47)  T(F): 97.5 (22 Aug 2020 06:00), Max: 97.6 (21 Aug 2020 16:47)  HR: 74 (22 Aug 2020 06:00) (74 - 76)  BP: 114/64 (22 Aug 2020 06:00) (112/66 - 114/64)  BP(mean): --  RR: 22 (22 Aug 2020 06:00) (22 - 24)  SpO2: 94% (22 Aug 2020 06:00) (91% - 94%) I&O's Summary    21 Aug 2020 07:01  -  22 Aug 2020 07:00  --------------------------------------------------------  IN: 0 mL / OUT: 1600 mL / NET: -1600 mL    GENERAL:  [x ]Alert  [x ]Oriented x 1  [ ]Lethargic  [ ]Cachexia  [ ]Unarousable  [ ]Verbal  [ ]Non-Verbal  Behavioral:   [ ] Anxiety  [ ] Delirium [ ] Agitation [ ] Other  HEENT:  [ ]Normal   [ ]Dry mouth   [ ]ET Tube/Trach  [ ]Oral lesions  PULMONARY:   [x ]Clear [ ]Tachypnea  [ ]Audible excessive secretions   [ ]Rhonchi        [ ]Right [ ]Left [ ]Bilateral  [ ]Crackles        [ ]Right [ ]Left [ ]Bilateral  [ ]Wheezing     [ ]Right [ ]Left [ ]Bilateral  [ ]Diminshed BS [ ]Right [ ]Left [ ]Bilateral    CARDIOVASCULAR:    [x ]Regular [ ]Irregular [ ]Tachy  [ ]Justyn [ ]Murmur [ ]Other  GASTROINTESTINAL:  [ x]Soft  [ ]Distended   [ x]+BS  [x ]Non tender [ ]Tender  [ ]PEG [ ]OGT/ NGT   Last BM:   8/21    GENITOURINARY: R nephrostomy draining thick brown fluid   [ ]Normal [ ] Incontinent   [ ]Oliguria/Anuria   [x ]Roth for acute urinary retention   MUSCULOSKELETAL:   [ ]Normal   [x ]Weakness  [ ]Bed/Wheelchair bound [ ]Edema  NEUROLOGIC:   [ ]No focal deficits  [ x] Cognitive impairment  [ ] Dysphagia [ ]Dysarthria [ ] Paresis [ ]Other   SKIN:   [x ]Normal  [ ]Rash     [ ]Pressure ulcer(s)  [ ]y [ ]n  Present on admission      CRITICAL CARE:  [ ] Shock Present  [ ]Septic [ ]Cardiogenic [ ]Neurologic [ ]Hypovolemic  [ ]  Vasopressors [ ]  Inotropes   [ ] Respiratory failure present [ ] Mechanical Ventilation [ ] Non-invasive ventilatory support [ ] High-Flow  [ ] Acute  [ ] Chronic [ ] Hypoxic  [ ] Hypercarbic [ ] Other  [ ] Other organ failure     LABS:                        8.0    11.55 )-----------( 339      ( 22 Aug 2020 07:03 )             26.1   08-22    136  |  101  |  7   ----------------------------<  88  3.5   |  23  |  0.96    Ca    8.8      22 Aug 2020 07:03  Phos  2.2     08-21  Mg     1.8     08-21          RADIOLOGY & ADDITIONAL STUDIES: < from: CT Abdomen and Pelvis w/ IV Cont (08.21.20 @ 15:53) >  XAM:  CT ABDOMEN AND PELVIS IC                            PROCEDURE DATE:  08/21/2020            INTERPRETATION:  CLINICAL INFORMATION: Persistent UTI. IR drainage of renal abscess.    COMPARISON: August 18, 2020.    PROCEDURE:  CT of the Abdomen and Pelvis was performed with intravenous contrast.  Intravenous contrast: 90 ml Omnipaque 350. 10 ml discarded.  Oral contrast: None.  Sagittal and coronal reformats were performed.    FINDINGS:  LOWER CHEST: Trace bilateral pleural effusions. Partial compressive atelectasis of both lower lobes. Calcified bilateral breast implants. Unchanged medially extruded material from the right breast implant.    LIVER: Unchanged subcentimeter left hepatic cyst.  BILE DUCTS: Normal caliber.  GALLBLADDER: Within normal limits.  SPLEEN: Within normal limits.  PANCREAS: Within normal limits.  ADRENALS: Within normal limits.  KIDNEYS/URETERS: Bilateral placement of right percutaneous nephrostomy tube with tip terminating in the renal pelvis. Severely hydronephrotic right kidney with decreased dilatation. Few droplets of intraluminal air, consistent with recent intervention. Hyperdense material surrounding the catheter in the upper pole calyx, possibly small amount of hemorrhage. Diffuse right urothelial enhancement involving the right renal collecting system to the level of the obstruction. Abnormal enhancing urothelial tissue at the ureteropelvic junction.    BLADDER: Within normal limits.  REPRODUCTIVE ORGANS: Right adnexal cyst, measuring 3.8 cm.    BOWEL: No bowel obstruction. Colonic diverticulosis.  PERITONEUM: No ascites.  VESSELS: Atherosclerotic changes.  RETROPERITONEUM/LYMPH NODES: No lymphadenopathy.  ABDOMINAL WALL: Within normal limits.  BONES: Degenerative changes. Left hip replacement. I'll compression of the T10 vertebral body. Old fracture of the left pubic ramus.    IMPRESSION:  Severely hydronephrotic right kidney with decreased dilatation after placement of right percutaneous gastrostomy tube. Small new hyperdense material around the catheter, probably small amount of hemorrhage. Diffuse urothelial enhancement is present, suspicious for superimposed infection.    Abnormal enhancing urothelial tissue at the ureteropelvic junction, possibly neoplasm.      < end of copied text >      PROTEIN CALORIE MALNUTRITION: [ ] mild [ ] moderate [ ] severe  [ ] underweight [ ] morbid obesity    https://www.andeal.org/vault/2440/web/files/ONC/Table_Clinical%20Characteristics%20to%20Document%20Malnutrition-White%20JV%20et%20al%767713.pdf        [ ] PPSV2 < or = 30% [ ] significant weight loss [x ] poor nutritional intake [ ] anasarca Albumin, Serum: 2.2 g/dL (08-20-20 @ 06:35)    Artificial Nutrition [ ]     REFERRALS:   [ ]Chaplaincy  [ ] Hospice  [ ]Child Life  [x ]Social Work  [ ]Case management [ ]Holistic Therapy [ ] Physical Therapy [ ] Dietary   Goals of Care Document:

## 2020-08-22 NOTE — PROGRESS NOTE ADULT - PROBLEM SELECTOR PLAN 2
- Pt w/ recent imaging at [facility] found to have L pubic ramus fracture, R sacral fracture, and R UPJ dilation size of grapefruit due to obstruction with R kidney chronically shriveled.  Dr. Hood spoke to Urology, no surgical intervention currently being planned due to shriveled kidney state   - CT 8/18/20 here showed further increased right renal collecting system and right renal pelvis size  - Urology consulted, pt not a candidate for nephrectomy.   - s/p R nephrostomy tube by IR 8/19. Nephrostomy tube draining well   - Continue Tylenol for mild pain, Morphine 1 mg q4 PRN for moderate-severe pain  - Repeat CT scan 8/21 showing improvement in right kidney dilation, no new collections

## 2020-08-22 NOTE — PROGRESS NOTE ADULT - PROBLEM SELECTOR PLAN 9
Diet: soft, ensure added, nutrition consult   DVT PPX: heparin SQ   GOC: DNR/DNI   Dispo: pending clinical improvement

## 2020-08-22 NOTE — PROGRESS NOTE ADULT - PROBLEM SELECTOR PLAN 5
- Hb 8.0 today, Hb uptrending from low 7.1. Likely multifactorial anemia due to inflammation and dilution from IV fluids. Iron studies show decreased TIBC, low iron. On ferrous sulfate 325   - FOBT positive  - Monitor AM CBC. Transfuse if hgb <7.   - Folate, B12, TSH WNL   - Maintain active T&S, to be drawn 8/23

## 2020-08-22 NOTE — PROGRESS NOTE ADULT - PROBLEM SELECTOR PLAN 6
- Pt w/ recent decline in functional status over the last month, less ambulatory. Prior to that ambulated with cane  - PT eval - home PT  - Supportive care

## 2020-08-22 NOTE — PROGRESS NOTE ADULT - PROBLEM SELECTOR PLAN 1
- s/p R nephrostomy tube with 1400 cc purulent drainage from R kidney mass 8/19   - Ucx from nephrostomy showed moderate E. Coli. On zosyn until 8/21, narrowed to ceftriaxone based on sensitivities. Per ID, continue IV antibiotics while inpatient, can switch to PO on discharge, duration to be determined   - CXR clear lungs, COVID negative    - On Ceftriaxone (8/22-. Previously Zosyn 3.375 mg q8 (8/18 - 8/21 ).   - ID consulted, recs appreciated.   - Repeat CT Abd w/ contrast 8/21 showing less dilated right kidney after nephrostomy placement with ?small hemorrhage around catheter  - Dental consult called for possible oral etiology of infection.

## 2020-08-22 NOTE — PROGRESS NOTE ADULT - PROBLEM SELECTOR PLAN 3
- Likely in the setting of R UPJ dilation due to obstruction w/ R kidney chronically shriveled. Dilation worsening per CT 8/18/20.  - martinez catheter in place  -Consider TOV

## 2020-08-22 NOTE — PROGRESS NOTE ADULT - PROBLEM SELECTOR PLAN 4
-Patient with chronic pain from osteoarthrtis per Dr. Hood, reportedly controlled on tylenol 1000 mg three times a day at home   -Changed tylenol to 975 mg q 8 hr ATC while inpatient

## 2020-08-23 DIAGNOSIS — N15.1 RENAL AND PERINEPHRIC ABSCESS: ICD-10-CM

## 2020-08-23 LAB
ALBUMIN SERPL ELPH-MCNC: 2.8 G/DL — LOW (ref 3.3–5)
ALP SERPL-CCNC: 132 U/L — HIGH (ref 40–120)
ALT FLD-CCNC: 26 U/L — SIGNIFICANT CHANGE UP (ref 10–45)
ANION GAP SERPL CALC-SCNC: 10 MMOL/L — SIGNIFICANT CHANGE UP (ref 5–17)
AST SERPL-CCNC: 32 U/L — SIGNIFICANT CHANGE UP (ref 10–40)
BILIRUB SERPL-MCNC: 0.2 MG/DL — SIGNIFICANT CHANGE UP (ref 0.2–1.2)
BLD GP AB SCN SERPL QL: NEGATIVE — SIGNIFICANT CHANGE UP
BUN SERPL-MCNC: 8 MG/DL — SIGNIFICANT CHANGE UP (ref 7–23)
CALCIUM SERPL-MCNC: 8.8 MG/DL — SIGNIFICANT CHANGE UP (ref 8.4–10.5)
CHLORIDE SERPL-SCNC: 99 MMOL/L — SIGNIFICANT CHANGE UP (ref 96–108)
CO2 SERPL-SCNC: 25 MMOL/L — SIGNIFICANT CHANGE UP (ref 22–31)
CREAT SERPL-MCNC: 1 MG/DL — SIGNIFICANT CHANGE UP (ref 0.5–1.3)
CULTURE RESULTS: SIGNIFICANT CHANGE UP
GLUCOSE SERPL-MCNC: 87 MG/DL — SIGNIFICANT CHANGE UP (ref 70–99)
HCT VFR BLD CALC: 27.4 % — LOW (ref 34.5–45)
HGB BLD-MCNC: 8.4 G/DL — LOW (ref 11.5–15.5)
MAGNESIUM SERPL-MCNC: 2 MG/DL — SIGNIFICANT CHANGE UP (ref 1.6–2.6)
MCHC RBC-ENTMCNC: 28.3 PG — SIGNIFICANT CHANGE UP (ref 27–34)
MCHC RBC-ENTMCNC: 30.7 GM/DL — LOW (ref 32–36)
MCV RBC AUTO: 92.3 FL — SIGNIFICANT CHANGE UP (ref 80–100)
NRBC # BLD: 0 /100 WBCS — SIGNIFICANT CHANGE UP (ref 0–0)
PHOSPHATE SERPL-MCNC: 2.5 MG/DL — SIGNIFICANT CHANGE UP (ref 2.5–4.5)
PLATELET # BLD AUTO: 354 K/UL — SIGNIFICANT CHANGE UP (ref 150–400)
POTASSIUM SERPL-MCNC: 3.6 MMOL/L — SIGNIFICANT CHANGE UP (ref 3.5–5.3)
POTASSIUM SERPL-SCNC: 3.6 MMOL/L — SIGNIFICANT CHANGE UP (ref 3.5–5.3)
PROT SERPL-MCNC: 6.2 G/DL — SIGNIFICANT CHANGE UP (ref 6–8.3)
RBC # BLD: 2.97 M/UL — LOW (ref 3.8–5.2)
RBC # FLD: 16.3 % — HIGH (ref 10.3–14.5)
RH IG SCN BLD-IMP: POSITIVE — SIGNIFICANT CHANGE UP
SODIUM SERPL-SCNC: 134 MMOL/L — LOW (ref 135–145)
SPECIMEN SOURCE: SIGNIFICANT CHANGE UP
WBC # BLD: 10.35 K/UL — SIGNIFICANT CHANGE UP (ref 3.8–10.5)
WBC # FLD AUTO: 10.35 K/UL — SIGNIFICANT CHANGE UP (ref 3.8–10.5)

## 2020-08-23 PROCEDURE — 99233 SBSQ HOSP IP/OBS HIGH 50: CPT | Mod: GC

## 2020-08-23 RX ADMIN — HEPARIN SODIUM 5000 UNIT(S): 5000 INJECTION INTRAVENOUS; SUBCUTANEOUS at 16:31

## 2020-08-23 RX ADMIN — Medication 975 MILLIGRAM(S): at 05:55

## 2020-08-23 RX ADMIN — ESCITALOPRAM OXALATE 5 MILLIGRAM(S): 10 TABLET, FILM COATED ORAL at 12:23

## 2020-08-23 RX ADMIN — Medication 975 MILLIGRAM(S): at 13:55

## 2020-08-23 RX ADMIN — SENNA PLUS 2 TABLET(S): 8.6 TABLET ORAL at 21:47

## 2020-08-23 RX ADMIN — Medication 3 MILLIGRAM(S): at 21:47

## 2020-08-23 RX ADMIN — DONEPEZIL HYDROCHLORIDE 10 MILLIGRAM(S): 10 TABLET, FILM COATED ORAL at 21:47

## 2020-08-23 RX ADMIN — HEPARIN SODIUM 5000 UNIT(S): 5000 INJECTION INTRAVENOUS; SUBCUTANEOUS at 08:38

## 2020-08-23 RX ADMIN — Medication 325 MILLIGRAM(S): at 12:23

## 2020-08-23 RX ADMIN — HEPARIN SODIUM 5000 UNIT(S): 5000 INJECTION INTRAVENOUS; SUBCUTANEOUS at 23:30

## 2020-08-23 RX ADMIN — CEFTRIAXONE 100 MILLIGRAM(S): 500 INJECTION, POWDER, FOR SOLUTION INTRAMUSCULAR; INTRAVENOUS at 21:47

## 2020-08-23 RX ADMIN — Medication 975 MILLIGRAM(S): at 21:47

## 2020-08-23 NOTE — PROGRESS NOTE ADULT - PROBLEM SELECTOR PLAN 5
- Hb 8.4 today, Hb uptrending from low 7.1. Likely multifactorial anemia due to inflammation and dilution from IV fluids. Iron studies show decreased TIBC, low iron. On ferrous sulfate 325   - FOBT positive  - Monitor AM CBC. Transfuse if hgb <7 or s/s of active bleeding  - Folate, B12, TSH WNL   - Maintain active T&S, next to be drawn 8/26 - Hb 8.4 today, Hb uptrend from low 7.1. Likely multifactorial anemia due to inflammation and dilution from IV fluids. Iron studies show decreased TIBC, low iron. On ferrous sulfate 325   - FOBT positive, for now conservative management will continue to f/u h/h   - Monitor AM CBC. Transfuse if hgb <7 or s/s of active bleeding  - Folate, B12, TSH WNL   - Maintain active T&S, next to be drawn 8/26

## 2020-08-23 NOTE — PROGRESS NOTE ADULT - PROBLEM SELECTOR PROBLEM 1
Sepsis, due to unspecified organism, unspecified whether acute organ dysfunction present Renal abscess, right

## 2020-08-23 NOTE — PROGRESS NOTE ADULT - PROBLEM SELECTOR PLAN 3
- Likely in the setting of R UPJ dilation due to obstruction w/ R kidney chronically shriveled. Dilation worsening per CT 8/18/20.  - martinez catheter in place, draining pale yellow urine   - Given nephrostomy output concerning for blood, although not likely acute bleeding, will continue martinez today to monitor if martinez output becomes bloody. If remains clear yellow, will remove martinez in early AM for TOV tomorrow in anticipation of discharge - Likely in the setting of R UPJ dilation due to obstruction w/ R kidney chronically shriveled. Dilation worsening per CT 8/18/20.  - martinez catheter in place, draining pale yellow urine   - Given nephrostomy output concerning for blood, although not likely acute bleeding, will continue martinez today to monitor if martinez output becomes bloody. If remains clear yellow, family would be in agreement if Martinez is needed at discharge, will f/u tomorrow to assess nephrostomy output as well as urine output to do voiding trial.

## 2020-08-23 NOTE — PROGRESS NOTE ADULT - PROBLEM SELECTOR PLAN 1
- nephrostomy with reddish drainage today although less purulent than previous, likely old blood from nephrostomy placement on 8/19, especially with increasing H/H today, but will continue tmonitor closely  - s/p R nephrostomy tube with 1400 cc purulent drainage from R kidney mass 8/19   - Cx from nephrostomy showed moderate E. Coli. On zosyn until 8/21, narrowed to ceftriaxone based on sensitivities. Per ID, continue IV antibiotics while inpatient, can switch to PO on discharge, duration to be determined   - CXR clear lungs, COVID negative    - On Ceftriaxone (8/22-. Previously Zosyn 3.375 mg q8 (8/18 - 8/21 ).   - ID consulted, recs appreciated.   - Repeat CT Abd w/ contrast 8/21 showing less dilated right kidney after nephrostomy placement with ?small hemorrhage around catheter  - Dental consult called for possible oral etiology of infection. - nephrostomy with reddish drainage today although less purulent than previous, likely old blood from nephrostomy placement on 8/19, especially with increasing H/H today, but will continue to monitor closely  -leukocytosis resolved, hemodynamically stable   - s/p R nephrostomy tube with 1400 cc purulent drainage from R kidney mass 8/19   - Cx from nephrostomy showed moderate E. Coli. On zosyn until 8/21, narrowed to ceftriaxone based on sensitivities. Per ID, continue IV antibiotics while inpatient, can switch to PO on discharge, duration to be determined   - CXR clear lungs, COVID negative    - On Ceftriaxone (8/22-. Previously Zosyn 3.375 mg q8 (8/18 - 8/21 ).   - ID consulted, recs appreciated.   - Repeat CT Abd w/ contrast 8/21 showing less dilated right kidney after nephrostomy placement with ?small hemorrhage around catheter  - Dental consult called for possible oral etiology of infection

## 2020-08-23 NOTE — PROGRESS NOTE ADULT - ASSESSMENT
This is a 87-year-old female with HTN, dementia, recent hospitalization at [facility] found to have L pubic ramus fracture, R sacral fracture, and R UPJ dilation, recent UTI, presenting with abdominal pain and decline in functional status over the last month. Found to have leukocytosis 17.7. Pt admitted for management of pain and sepsis s/p IR drainage of 1.4 L of pus from dilated ureter.

## 2020-08-23 NOTE — PROGRESS NOTE ADULT - PROBLEM SELECTOR PLAN 4
-Patient with chronic pain from osteoarthrtis per Dr. Hood, reportedly controlled on tylenol 1000 mg three times a day at home   -Continue tylenol to 975 mg q 8 hr ATC while inpatient -Patient with chronic pain from osteoarthritis per Dr. Hood, reportedly controlled on Tylenol 1000 mg three times a day at home   -Continue Tylenol to 975 mg q 8 hr ATC while inpatient

## 2020-08-23 NOTE — PROGRESS NOTE ADULT - PROBLEM SELECTOR PLAN 2
- Pt w/ recent imaging at [facility] found to have L pubic ramus fracture, R sacral fracture, and R UPJ dilation size of grapefruit due to obstruction with R kidney chronically shriveled.  Dr. Hood spoke to Urology, no surgical intervention currently being planned due to shriveled kidney state   - CT 8/18/20 here showed further increased right renal collecting system and right renal pelvis size  - Urology consulted, pt not a candidate for nephrectomy.   - s/p R nephrostomy tube by IR 8/19. Nephrostomy tube draining well   - Continue Tylenol for mild pain, Morphine 1 mg q4 PRN for moderate-severe pain  - Repeat CT scan 8/21 showing improvement in right kidney dilation, no new collections

## 2020-08-23 NOTE — PROGRESS NOTE ADULT - SUBJECTIVE AND OBJECTIVE BOX
GAP TEAM PALLIATIVE CARE UNIT PROGRESS NOTE:      [  ] Patient on hospice program.    INDICATION FOR PALLIATIVE CARE UNIT SERVICES:    INTERVAL HPI/OVERNIGHT EVENTS: Right nephrostomy with more serous output but also more red in color. Roth with pale yellow urine. Patient denies any complaints this morning.     DNR on chart: Yes  Yes    Allergies    No Known Allergies    Intolerances    MEDICATIONS  (STANDING):  acetaminophen   Tablet .. 975 milliGRAM(s) Oral every 8 hours  cefTRIAXone   IVPB 1000 milliGRAM(s) IV Intermittent every 24 hours  donepezil 10 milliGRAM(s) Oral at bedtime  escitalopram 5 milliGRAM(s) Oral daily  ferrous    sulfate 325 milliGRAM(s) Oral daily  heparin   Injectable 5000 Unit(s) SubCutaneous every 8 hours  melatonin 3 milliGRAM(s) Oral at bedtime  senna 2 Tablet(s) Oral at bedtime  sodium chloride 0.9%. 1000 milliLiter(s) (100 mL/Hr) IV Continuous <Continuous>    MEDICATIONS  (PRN):  bisacodyl Suppository 10 milliGRAM(s) Rectal daily PRN Constipation  morphine  - Injectable 1 milliGRAM(s) IV Push every 4 hours PRN moderate to severe pain  morphine  - Injectable 1 milliGRAM(s) IV Push every 4 hours PRN dyspnea    ITEMS UNCHECKED ARE NOT PRESENT    PRESENT SYMPTOMS: [ ]Unable to obtain due to poor mentation   Source if other than patient:  [ ]Family   [ ]Team     Pain: [ ] yes [x ] no  QOL impact -   Location -                    Aggravating factors -  Quality -  Radiation -  Timing-  Severity (0-10 scale):  Minimal acceptable level (0-10 scale):     Dyspnea:                           [ ]Mild [ ]Moderate [ ]Severe  Anxiety:                             [ ]Mild [ ]Moderate [ ]Severe  Fatigue:                             [ ]Mild [ ]Moderate [ ]Severe  Nausea:                             [ ]Mild [ ]Moderate [ ]Severe  Loss of appetite:              [ ]Mild [ x]Moderate [ ]Severe  Constipation:                    [ ]Mild [ ]Moderate [ ]Severe    PAINAD Score:    http://geriatrictoolkit.[employer].edu/cog/painad.pdf (Ctrl +  left click to view)  		  Other Symptoms:  [ ]All other review of systems negative     Palliative Performance Status Version 2:      40   %         http://npcrc.org/files/news/palliative_performance_scale_ppsv2.pdf  PHYSICAL EXAM:  Vital Signs Last 24 Hrs  T(C): --  T(F): --  HR: --  BP: --  BP(mean): --  RR: --  SpO2: -- I&O's Summary    22 Aug 2020 07:01  -  23 Aug 2020 07:00  --------------------------------------------------------  IN: 0 mL / OUT: 790 mL / NET: -790 mL      LABS:                        8.4    10.35 )-----------( 354      ( 23 Aug 2020 06:17 )             27.4   08-23    134<L>  |  99  |  8   ----------------------------<  87  3.6   |  25  |  1.00    Ca    8.8      23 Aug 2020 06:17  Phos  2.5     08-23  Mg     2.0     08-23    TPro  6.2  /  Alb  2.8<L>  /  TBili  0.2  /  DBili  x   /  AST  32  /  ALT  26  /  AlkPhos  132<H>  08-23        RADIOLOGY & ADDITIONAL STUDIES: no new imaging studies    PROTEIN CALORIE MALNUTRITION: [ ] mild [ ] moderate [ ] severe  [ ] underweight [ ] morbid obesity    https://www.andeal.org/vault/2440/web/files/ONC/Table_Clinical%20Characteristics%20to%20Document%20Malnutrition-White%20JV%20et%20al%202012.pdf        [ ] PPSV2 < or = 30% [ ] significant weight loss [x ] poor nutritional intake [ ] anasarca Albumin, Serum: 2.8 g/dL (08-23-20 @ 06:17)    Artificial Nutrition [ ]     REFERRALS:   [ ]Chaplaincy  [ ] Hospice  [ ]Child Life  [x ]Social Work  [ ]Case management [ ]Holistic Therapy [ ] Physical Therapy [ ] Dietary   Goals of Care Document: GAP TEAM PALLIATIVE CARE UNIT PROGRESS NOTE:      [  ] Patient on hospice program.    INDICATION FOR PALLIATIVE CARE UNIT SERVICES: symptom control, workup for abdominal pain, leukocytosis     INTERVAL HPI/OVERNIGHT EVENTS: Right nephrostomy with more serous output but also more red in color. Roth with pale yellow urine. Patient denies any complaints this morning.     DNR on chart: Yes  Yes    Allergies    No Known Allergies    Intolerances    MEDICATIONS  (STANDING):  acetaminophen   Tablet .. 975 milliGRAM(s) Oral every 8 hours  cefTRIAXone   IVPB 1000 milliGRAM(s) IV Intermittent every 24 hours  donepezil 10 milliGRAM(s) Oral at bedtime  escitalopram 5 milliGRAM(s) Oral daily  ferrous    sulfate 325 milliGRAM(s) Oral daily  heparin   Injectable 5000 Unit(s) SubCutaneous every 8 hours  melatonin 3 milliGRAM(s) Oral at bedtime  senna 2 Tablet(s) Oral at bedtime  sodium chloride 0.9%. 1000 milliLiter(s) (100 mL/Hr) IV Continuous <Continuous>    MEDICATIONS  (PRN):  bisacodyl Suppository 10 milliGRAM(s) Rectal daily PRN Constipation  morphine  - Injectable 1 milliGRAM(s) IV Push every 4 hours PRN moderate to severe pain  morphine  - Injectable 1 milliGRAM(s) IV Push every 4 hours PRN dyspnea    ITEMS UNCHECKED ARE NOT PRESENT    PRESENT SYMPTOMS: [ ]Unable to obtain due to poor mentation   Source if other than patient:  [ ]Family   [ ]Team     Pain: [ ] yes [x ] no  QOL impact -   Location -                    Aggravating factors -  Quality -  Radiation -  Timing-  Severity (0-10 scale):  Minimal acceptable level (0-10 scale):     Dyspnea:                           [ ]Mild [ ]Moderate [ ]Severe  Anxiety:                             [ ]Mild [ ]Moderate [ ]Severe  Fatigue:                             [ ]Mild [ ]Moderate [ ]Severe  Nausea:                             [ ]Mild [ ]Moderate [ ]Severe  Loss of appetite:              [ ]Mild [ x]Moderate [ ]Severe  Constipation:                    [ ]Mild [ ]Moderate [ ]Severe    PAINAD Score:    http://geriatrictoolkit.[employer].edu/cog/painad.pdf (Ctrl +  left click to view)  		  Other Symptoms:  [ ]All other review of systems negative     Palliative Performance Status Version 2:      40   %         http://npcrc.org/files/news/palliative_performance_scale_ppsv2.pdf  PHYSICAL EXAM:  Vital Signs Last 24 Hrs  T(C): --  T(F): --  HR: --  BP: --  BP(mean): --  RR: --  SpO2: -- I&O's Summary    22 Aug 2020 07:01  -  23 Aug 2020 07:00  --------------------------------------------------------  IN: 0 mL / OUT: 790 mL / NET: -790 mL    GENERAL:  [x ]Alert  [x ]Oriented x 1  [ ]Lethargic  [ ]Cachexia  [ ]Unarousable  [ ]Verbal  [ ]Non-Verbal  Behavioral:   [ ] Anxiety  [ ] Delirium [ ] Agitation [ ] Other  HEENT:  [ ]Normal   [ ]Dry mouth   [ ]ET Tube/Trach  [ ]Oral lesions  PULMONARY:   [x ]Clear [ ]Tachypnea  [ ]Audible excessive secretions   [ ]Rhonchi        [ ]Right [ ]Left [ ]Bilateral  [ ]Crackles        [ ]Right [ ]Left [ ]Bilateral  [ ]Wheezing     [ ]Right [ ]Left [ ]Bilateral  [ ]Diminshed BS [ ]Right [ ]Left [ ]Bilateral    CARDIOVASCULAR:    [x ]Regular [ ]Irregular [ ]Tachy  [ ]Justyn [ ]Murmur [ ]Other  GASTROINTESTINAL:  [ x]Soft  [ ]Distended   [ x]+BS  [x ]Non tender [ ]Tender  [ ]PEG [ ]OGT/ NGT   Last BM:   8/21    GENITOURINARY: R nephrostomy draining brown-red fluid, more serous in appearance than yesterday   [ ]Normal [ ] Incontinent   [ ]Oliguria/Anuria   [x ]Roth for acute urinary retention   MUSCULOSKELETAL:   [ ]Normal   [x ]Weakness  [ ]Bed/Wheelchair bound [ ]Edema  NEUROLOGIC:   [ ]No focal deficits  [ x] Cognitive impairment  [ ] Dysphagia [ ]Dysarthria [ ] Paresis [ ]Other   SKIN:   [x ]Normal  [ ]Rash     [ ]Pressure ulcer(s)  [ ]y [ ]n  Present on admission      CRITICAL CARE:  [ ] Shock Present  [ ]Septic [ ]Cardiogenic [ ]Neurologic [ ]Hypovolemic  [ ]  Vasopressors [ ]  Inotropes   [ ] Respiratory failure present [ ] Mechanical Ventilation [ ] Non-invasive ventilatory support [ ] High-Flow  [ ] Acute  [ ] Chronic [ ] Hypoxic  [ ] Hypercarbic [ ] Other  [ ] Other organ failure     LABS:                        8.4    10.35 )-----------( 354      ( 23 Aug 2020 06:17 )             27.4   08-23    134<L>  |  99  |  8   ----------------------------<  87  3.6   |  25  |  1.00    Ca    8.8      23 Aug 2020 06:17  Phos  2.5     08-23  Mg     2.0     08-23    TPro  6.2  /  Alb  2.8<L>  /  TBili  0.2  /  DBili  x   /  AST  32  /  ALT  26  /  AlkPhos  132<H>  08-23        RADIOLOGY & ADDITIONAL STUDIES: no new imaging studies    PROTEIN CALORIE MALNUTRITION: [ ] mild [ ] moderate [ ] severe  [ ] underweight [ ] morbid obesity    https://www.andeal.org/vault/2440/web/files/ONC/Table_Clinical%20Characteristics%20to%20Document%20Malnutrition-White%20JV%20et%20al%202012.pdf        [ ] PPSV2 < or = 30% [ ] significant weight loss [x ] poor nutritional intake [ ] anasarca Albumin, Serum: 2.8 g/dL (08-23-20 @ 06:17)    Artificial Nutrition [ ]     REFERRALS:   [ ]Chaplaincy  [ ] Hospice  [ ]Child Life  [x ]Social Work  [ ]Case management [ ]Holistic Therapy [ ] Physical Therapy [ ] Dietary   Goals of Care Document: GAP TEAM PALLIATIVE CARE UNIT PROGRESS NOTE:      [  ] Patient on hospice program.    INDICATION FOR PALLIATIVE CARE UNIT SERVICES: symptom control, workup for abdominal pain, leukocytosis     INTERVAL HPI/OVERNIGHT EVENTS: Right nephrostomy with more serous output but also more red in color. Roth with pale yellow urine. Patient denies any complaints this morning.     DNR on chart: Yes  Yes    Allergies    No Known Allergies    Intolerances    MEDICATIONS  (STANDING):  acetaminophen   Tablet .. 975 milliGRAM(s) Oral every 8 hours  cefTRIAXone   IVPB 1000 milliGRAM(s) IV Intermittent every 24 hours  donepezil 10 milliGRAM(s) Oral at bedtime  escitalopram 5 milliGRAM(s) Oral daily  ferrous    sulfate 325 milliGRAM(s) Oral daily  heparin   Injectable 5000 Unit(s) SubCutaneous every 8 hours  melatonin 3 milliGRAM(s) Oral at bedtime  senna 2 Tablet(s) Oral at bedtime  sodium chloride 0.9%. 1000 milliLiter(s) (100 mL/Hr) IV Continuous <Continuous>    MEDICATIONS  (PRN):  bisacodyl Suppository 10 milliGRAM(s) Rectal daily PRN Constipation  morphine  - Injectable 1 milliGRAM(s) IV Push every 4 hours PRN moderate to severe pain  morphine  - Injectable 1 milliGRAM(s) IV Push every 4 hours PRN dyspnea    ITEMS UNCHECKED ARE NOT PRESENT    PRESENT SYMPTOMS: [ ]Unable to obtain due to poor mentation   Source if other than patient:  [ ]Family   [ ]Team     Pain: [ ] yes [x ] no  QOL impact -   Location -                    Aggravating factors -  Quality -  Radiation -  Timing-  Severity (0-10 scale):  Minimal acceptable level (0-10 scale):     Dyspnea:                           [ ]Mild [ ]Moderate [ ]Severe  Anxiety:                             [ ]Mild [ ]Moderate [ ]Severe  Fatigue:                             [ ]Mild [ ]Moderate [ ]Severe  Nausea:                             [ ]Mild [ ]Moderate [ ]Severe  Loss of appetite:              [ ]Mild [ x]Moderate [ ]Severe  Constipation:                    [ ]Mild [ ]Moderate [ ]Severe    PAINAD Score:    http://geriatrictoolkit.[place].[facility]/cog/painad.pdf (Ctrl +  left click to view)  		  Other Symptoms:  [ x]All other review of systems negative     Palliative Performance Status Version 2:      40   %         http://npcrc.org/files/news/palliative_performance_scale_ppsv2.pdf    PHYSICAL EXAM:  Vital Signs Last 24 Hrs  T(C): --  T(F): --  HR: --  BP: --  BP(mean): --  RR: --  SpO2: -- I&O's Summary    22 Aug 2020 07:01  -  23 Aug 2020 07:00  --------------------------------------------------------  IN: 0 mL / OUT: 790 mL / NET: -790 mL    GENERAL:  [x ]Alert  [x ]Oriented x 1  [ ]Lethargic  [ ]Cachexia  [ ]Unarousable  [x ]Verbal  [ ]Non-Verbal  Behavioral:   [ ] Anxiety  [ ] Delirium [ ] Agitation [ ] Other  HEENT:  [ ]Normal   [ ]Dry mouth   [ ]ET Tube/Trach  [ ]Oral lesions  PULMONARY:   [x ]Clear [ ]Tachypnea  [ ]Audible excessive secretions   [ ]Rhonchi        [ ]Right [ ]Left [ ]Bilateral  [ ]Crackles        [ ]Right [ ]Left [ ]Bilateral  [ ]Wheezing     [ ]Right [ ]Left [ ]Bilateral  [ ]Diminshed BS [ ]Right [ ]Left [ ]Bilateral    CARDIOVASCULAR:    [x ]Regular [ ]Irregular [ ]Tachy  [ ]Justyn [ ]Murmur [ ]Other  GASTROINTESTINAL:  [ x]Soft  [ ]Distended   [ x]+BS  [x ]Non tender [ ]Tender  [ ]PEG [ ]OGT/ NGT   Last BM:   8/21    GENITOURINARY: R nephrostomy draining brown-red fluid, more serous in appearance than yesterday   [ ]Normal [ ] Incontinent   [ ]Oliguria/Anuria   [x ]Roth for acute urinary retention   MUSCULOSKELETAL:   [ ]Normal   [x ]Weakness  [ ]Bed/Wheelchair bound [ ]Edema  NEUROLOGIC:   [ ]No focal deficits  [ x] Cognitive impairment  [ ] Dysphagia [ ]Dysarthria [ ] Paresis [ ]Other   SKIN:   [x ]Normal  [ ]Rash     [ ]Pressure ulcer(s)  [ ]y [ ]n  Present on admission      CRITICAL CARE:  [ ] Shock Present  [ ]Septic [ ]Cardiogenic [ ]Neurologic [ ]Hypovolemic  [ ]  Vasopressors [ ]  Inotropes   [ ] Respiratory failure present [ ] Mechanical Ventilation [ ] Non-invasive ventilatory support [ ] High-Flow  [ ] Acute  [ ] Chronic [ ] Hypoxic  [ ] Hypercarbic [ ] Other  [ ] Other organ failure     LABS:                        8.4    10.35 )-----------( 354      ( 23 Aug 2020 06:17 )             27.4   08-23    134<L>  |  99  |  8   ----------------------------<  87  3.6   |  25  |  1.00    Ca    8.8      23 Aug 2020 06:17  Phos  2.5     08-23  Mg     2.0     08-23    TPro  6.2  /  Alb  2.8<L>  /  TBili  0.2  /  DBili  x   /  AST  32  /  ALT  26  /  AlkPhos  132<H>  08-23    RADIOLOGY & ADDITIONAL STUDIES: no new imaging studies    PROTEIN CALORIE MALNUTRITION: [ ] mild [ ] moderate [ ] severe  [ ] underweight [ ] morbid obesity    https://www.andeal.org/vault/2440/web/files/ONC/Table_Clinical%20Characteristics%20to%20Document%20Malnutrition-White%20JV%20et%20al%202012.pdf    [ ] PPSV2 < or = 30% [ ] significant weight loss [x ] poor nutritional intake [ ] anasarca Albumin, Serum: 2.8 g/dL (08-23-20 @ 06:17)    Artificial Nutrition [ ]     REFERRALS:   [ ]Chaplaincy  [ ] Hospice  [ ]Child Life  [x ]Social Work  [ ]Case management [ ]Holistic Therapy [ ] Physical Therapy [ ] Dietary   Goals of Care Document:

## 2020-08-24 ENCOUNTER — TRANSCRIPTION ENCOUNTER (OUTPATIENT)
Age: 85
End: 2020-08-24

## 2020-08-24 VITALS
OXYGEN SATURATION: 94 % | TEMPERATURE: 98 F | SYSTOLIC BLOOD PRESSURE: 108 MMHG | RESPIRATION RATE: 20 BRPM | HEART RATE: 89 BPM | DIASTOLIC BLOOD PRESSURE: 64 MMHG

## 2020-08-24 PROCEDURE — 84443 ASSAY THYROID STIM HORMONE: CPT

## 2020-08-24 PROCEDURE — 99232 SBSQ HOSP IP/OBS MODERATE 35: CPT

## 2020-08-24 PROCEDURE — 80048 BASIC METABOLIC PNL TOTAL CA: CPT

## 2020-08-24 PROCEDURE — 71045 X-RAY EXAM CHEST 1 VIEW: CPT

## 2020-08-24 PROCEDURE — C1729: CPT

## 2020-08-24 PROCEDURE — 81003 URINALYSIS AUTO W/O SCOPE: CPT

## 2020-08-24 PROCEDURE — 82607 VITAMIN B-12: CPT

## 2020-08-24 PROCEDURE — 85652 RBC SED RATE AUTOMATED: CPT

## 2020-08-24 PROCEDURE — 82728 ASSAY OF FERRITIN: CPT

## 2020-08-24 PROCEDURE — 83550 IRON BINDING TEST: CPT

## 2020-08-24 PROCEDURE — 84100 ASSAY OF PHOSPHORUS: CPT

## 2020-08-24 PROCEDURE — 82746 ASSAY OF FOLIC ACID SERUM: CPT

## 2020-08-24 PROCEDURE — 86850 RBC ANTIBODY SCREEN: CPT

## 2020-08-24 PROCEDURE — 85027 COMPLETE CBC AUTOMATED: CPT

## 2020-08-24 PROCEDURE — 86900 BLOOD TYPING SEROLOGIC ABO: CPT

## 2020-08-24 PROCEDURE — 99231 SBSQ HOSP IP/OBS SF/LOW 25: CPT

## 2020-08-24 PROCEDURE — 85730 THROMBOPLASTIN TIME PARTIAL: CPT

## 2020-08-24 PROCEDURE — 83735 ASSAY OF MAGNESIUM: CPT

## 2020-08-24 PROCEDURE — 83540 ASSAY OF IRON: CPT

## 2020-08-24 PROCEDURE — 84145 PROCALCITONIN (PCT): CPT

## 2020-08-24 PROCEDURE — 86769 SARS-COV-2 COVID-19 ANTIBODY: CPT

## 2020-08-24 PROCEDURE — 87040 BLOOD CULTURE FOR BACTERIA: CPT

## 2020-08-24 PROCEDURE — C1894: CPT

## 2020-08-24 PROCEDURE — 80053 COMPREHEN METABOLIC PANEL: CPT

## 2020-08-24 PROCEDURE — 87186 SC STD MICRODIL/AGAR DIL: CPT

## 2020-08-24 PROCEDURE — 50432 PLMT NEPHROSTOMY CATHETER: CPT

## 2020-08-24 PROCEDURE — U0003: CPT

## 2020-08-24 PROCEDURE — 97530 THERAPEUTIC ACTIVITIES: CPT

## 2020-08-24 PROCEDURE — C1769: CPT

## 2020-08-24 PROCEDURE — 87086 URINE CULTURE/COLONY COUNT: CPT

## 2020-08-24 PROCEDURE — 82272 OCCULT BLD FECES 1-3 TESTS: CPT

## 2020-08-24 PROCEDURE — 97162 PT EVAL MOD COMPLEX 30 MIN: CPT

## 2020-08-24 PROCEDURE — 97110 THERAPEUTIC EXERCISES: CPT

## 2020-08-24 PROCEDURE — 86901 BLOOD TYPING SEROLOGIC RH(D): CPT

## 2020-08-24 PROCEDURE — 74177 CT ABD & PELVIS W/CONTRAST: CPT

## 2020-08-24 PROCEDURE — 85610 PROTHROMBIN TIME: CPT

## 2020-08-24 RX ORDER — FERROUS SULFATE 325(65) MG
1 TABLET ORAL
Qty: 0 | Refills: 0 | DISCHARGE
Start: 2020-08-24

## 2020-08-24 RX ORDER — ACETAMINOPHEN 500 MG
3 TABLET ORAL
Qty: 0 | Refills: 0 | DISCHARGE
Start: 2020-08-24

## 2020-08-24 RX ORDER — AMLODIPINE BESYLATE 2.5 MG/1
10 TABLET ORAL DAILY
Refills: 0 | Status: DISCONTINUED | OUTPATIENT
Start: 2020-08-24 | End: 2020-08-24

## 2020-08-24 RX ORDER — MORPHINE SULFATE 50 MG/1
1 CAPSULE, EXTENDED RELEASE ORAL EVERY 4 HOURS
Refills: 0 | Status: DISCONTINUED | OUTPATIENT
Start: 2020-08-24 | End: 2020-08-24

## 2020-08-24 RX ADMIN — Medication 975 MILLIGRAM(S): at 14:10

## 2020-08-24 RX ADMIN — ESCITALOPRAM OXALATE 5 MILLIGRAM(S): 10 TABLET, FILM COATED ORAL at 12:17

## 2020-08-24 RX ADMIN — HEPARIN SODIUM 5000 UNIT(S): 5000 INJECTION INTRAVENOUS; SUBCUTANEOUS at 08:34

## 2020-08-24 RX ADMIN — Medication 1 TABLET(S): at 16:46

## 2020-08-24 RX ADMIN — Medication 325 MILLIGRAM(S): at 12:17

## 2020-08-24 RX ADMIN — HEPARIN SODIUM 5000 UNIT(S): 5000 INJECTION INTRAVENOUS; SUBCUTANEOUS at 16:45

## 2020-08-24 RX ADMIN — Medication 975 MILLIGRAM(S): at 05:58

## 2020-08-24 NOTE — DISCHARGE NOTE NURSING/CASE MANAGEMENT/SOCIAL WORK - PATIENT PORTAL LINK FT
You can access the FollowMyHealth Patient Portal offered by [facility] by registering at the following website: [email or web address]. By joining [facility]’s FollowMyHealth portal, you will also be able to view your health information using other applications (apps) compatible with our system.

## 2020-08-24 NOTE — PROGRESS NOTE ADULT - ASSESSMENT
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 Roberth Olmstead.    Continue global care/ comfort care as per primary team/PCU.    Forward flush the Right nephrostomy tube as ordered, Maintain dressing over the site.  Monitor and record the daily right nephrostomy outputs.  Antibiotics per primary team.    Regarding nephrostomy tube follow up with IR an IR contact information business card was previously given to the patient's daughter and she was instructed that her mother should have routine exchange of the Right nephrostomy tube every 3 months.  IF patient is d/c with the right nephrostomy tube she will need to continue the same nephrostomy care.   The patient's children can contact IR booking office at [phone] to have routine exchange of right nephrostomy tube scheduled every 3 months.    Vinnie Mccann, RPA-C  [facility] [id number]  Ext [phone]

## 2020-08-24 NOTE — PROGRESS NOTE ADULT - SUBJECTIVE AND OBJECTIVE BOX
GAP TEAM PALLIATIVE CARE UNIT PROGRESS NOTE:      [] Patient on hospice program.    INDICATION FOR PALLIATIVE CARE UNIT SERVICES: workup of abdominal pain, leukocytosis    INTERVAL HPI/OVERNIGHT EVENTS: No acute events overnight. Received no PRN morphine.     DNR on chart: Yes  Yes    Allergies    No Known Allergies    Intolerances    MEDICATIONS  (STANDING):  acetaminophen   Tablet .. 975 milliGRAM(s) Oral every 8 hours  cefTRIAXone   IVPB 1000 milliGRAM(s) IV Intermittent every 24 hours  donepezil 10 milliGRAM(s) Oral at bedtime  escitalopram 5 milliGRAM(s) Oral daily  ferrous    sulfate 325 milliGRAM(s) Oral daily  heparin   Injectable 5000 Unit(s) SubCutaneous every 8 hours  melatonin 3 milliGRAM(s) Oral at bedtime  senna 2 Tablet(s) Oral at bedtime  sodium chloride 0.9%. 1000 milliLiter(s) (100 mL/Hr) IV Continuous <Continuous>    MEDICATIONS  (PRN):  bisacodyl Suppository 10 milliGRAM(s) Rectal daily PRN Constipation  morphine  - Injectable 1 milliGRAM(s) IV Push every 4 hours PRN dyspnea  morphine  - Injectable 1 milliGRAM(s) IV Push every 4 hours PRN moderate to severe pain    ITEMS UNCHECKED ARE NOT PRESENT    PRESENT SYMPTOMS: [x]Unable to obtain due to poor mentation   Source if other than patient:  []Family   []Team     Pain: [] yes [] no  QOL impact -   Location -                    Aggravating factors -  Quality -  Radiation -  Timing -  Severity (0-10 scale):  Minimal acceptable level (0-10 scale):     Dyspnea:                           []Mild []Moderate []Severe  Anxiety:                             []Mild []Moderate []Severe  Fatigue:                             []Mild []Moderate []Severe  Nausea:                             []Mild []Moderate []Severe  Loss of appetite:              []Mild []Moderate []Severe  Constipation:                    []Mild []Moderate []Severe    PAINAD Score: 0  http://geriatrictoolkit.[employer].edu/cog/painad.pdf  		  Other Symptoms:  [x]All other review of systems negative     Karnofsky Performance Score/Palliative Performance Status Version 2: 40 %  http://npcrc.org/files/news/palliative_performance_scale_ppsv2.pdf    PHYSICAL EXAM:  Vital Signs Last 24 Hrs  T(C): 36.4 (24 Aug 2020 07:19), Max: 36.4 (24 Aug 2020 07:19)  T(F): 97.5 (24 Aug 2020 07:19), Max: 97.5 (24 Aug 2020 07:19)  HR: 100 (24 Aug 2020 07:19) (100 - 100)  BP: 130/76 (24 Aug 2020 07:19) (130/76 - 130/76)  RR: 21 (24 Aug 2020 07:19) (21 - 21)  SpO2: 92% (24 Aug 2020 07:19) (92% - 92%)     I&O's Summary    23 Aug 2020 07:01  -  24 Aug 2020 07:00  --------------------------------------------------------  IN: 0 mL / OUT: 850 mL / NET: -850 mL    GENERAL:  [x]Alert  [x]Oriented x 2 (person, place)  []Lethargic  []Cachexia  []Unarousable  [x]Verbal  []Non-Verbal  Behavioral:   [] Anxiety  [] Delirium [] Agitation [] Other  HEENT:  [x]Normal   []Dry mouth   []ET Tube/Trach  []Oral lesions  PULMONARY:   [x]Clear []Tachypnea  []Audible excessive secretions   []Rhonchi        []Right []Left []Bilateral  []Crackles        []Right []Left []Bilateral  []Wheezing     []Right []Left []Bilateral  CARDIOVASCULAR:    [x]Regular []Irregular []Tachy  []Justyn []Murmur []Other  GASTROINTESTINAL:  [x]Soft []Distended [x]+BS [x]Non tender []Tender []PEG []OGT/ NGT   Last BM:     GENITOURINARY: + R nephrostomy tube in place with serosanguinous fluid  []Normal [x] Incontinent   []Oliguria/Anuria   [x]Roth  MUSCULOSKELETAL:   []Normal  [x]Weakness  [x]Bed/Wheelchair bound []Edema  NEUROLOGIC:   []No focal deficits  [x]Cognitive impairment  []Dysphagia []Dysarthria []Paresis []Other   SKIN:   []Normal   []Pressure ulcer(s)  [x]Rash: Please refer to nursing notes for further documentation    CRITICAL CARE:  [ ] Shock Present  [ ]Septic [ ]Cardiogenic [ ]Neurologic [ ]Hypovolemic  [ ]  Vasopressors [ ]  Inotropes   [ ] Respiratory failure present [ ] Mechanical Ventilation [ ] Non-invasive ventilatory support [ ] High-Flow  [ ] Acute  [ ] Chronic [ ] Hypoxic  [ ] Hypercarbic [ ] Other  [ ] Other organ failure     LABS: None new                        8.4    10.35 )-----------( 354      ( 23 Aug 2020 06:17 )             27.4   08-23    134<L>  |  99  |  8   ----------------------------<  87  3.6   |  25  |  1.00    Ca    8.8      23 Aug 2020 06:17  Phos  2.5     08-23  Mg     2.0     08-23    TPro  6.2  /  Alb  2.8<L>  /  TBili  0.2  /  DBili  x   /  AST  32  /  ALT  26  /  AlkPhos  132<H>  08-23      RADIOLOGY & ADDITIONAL STUDIES: None new    PROTEIN CALORIE MALNUTRITION:   [x] PPSV2 < or = 30% [] significant weight loss [] poor nutritional intake [] anasarca [] catabolic state   Albumin, Serum: 2.8 g/dL (08-23-20 @ 06:17)   Artificial Nutrition []     REFERRALS:   []Chaplaincy  []Hospice  []Child Life  [x]Social Work  []Case management []Holistic Therapy []Physical Therapy []Dietary   Goals of Care Document:   Care Coordination Document: Progress Notes - Care Coordination [C. Provider] (08-21-20 @ 15:16)                                       Progress Notes    PROGRESS NOTE  Date & Time of Note   2020-08-21 15:15    Notes    Notes: LMSW continues to follow. Patient with abdominal mass s/p drain s/p day  3 Zosyn s/p ID f/u on Abx duration. Patient pending CT w/ Contrast scan.  Patient seen by PT recommends home PT and RW upon discharge. Patient's daughter  at bedside in agreement with home care referral to [facility] upon  discharge. LMSW will complete home care reform and will require ambulance  transportation for discharge.       Electronically signed by:  aFy Lock  Electronically signed on:  2020-08-21  15:16 GAP TEAM PALLIATIVE CARE UNIT PROGRESS NOTE:      [] Patient on hospice program.    INDICATION FOR PALLIATIVE CARE UNIT SERVICES: workup of abdominal pain, leukocytosis    INTERVAL HPI/OVERNIGHT EVENTS: No acute events overnight. Received no PRN morphine.     DNR on chart: Yes  Yes    Allergies    No Known Allergies    Intolerances    MEDICATIONS  (STANDING):  acetaminophen   Tablet .. 975 milliGRAM(s) Oral every 8 hours  cefTRIAXone   IVPB 1000 milliGRAM(s) IV Intermittent every 24 hours  donepezil 10 milliGRAM(s) Oral at bedtime  escitalopram 5 milliGRAM(s) Oral daily  ferrous    sulfate 325 milliGRAM(s) Oral daily  heparin   Injectable 5000 Unit(s) SubCutaneous every 8 hours  melatonin 3 milliGRAM(s) Oral at bedtime  senna 2 Tablet(s) Oral at bedtime  sodium chloride 0.9%. 1000 milliLiter(s) (100 mL/Hr) IV Continuous <Continuous>    MEDICATIONS  (PRN):  bisacodyl Suppository 10 milliGRAM(s) Rectal daily PRN Constipation  morphine  - Injectable 1 milliGRAM(s) IV Push every 4 hours PRN dyspnea  morphine  - Injectable 1 milliGRAM(s) IV Push every 4 hours PRN moderate to severe pain    ITEMS UNCHECKED ARE NOT PRESENT    PRESENT SYMPTOMS: [x]Unable to obtain due to poor mentation   Source if other than patient:  []Family   []Team     Pain: [] yes [x] no  QOL impact -   Location -                    Aggravating factors -  Quality -  Radiation -  Timing -  Severity (0-10 scale):  Minimal acceptable level (0-10 scale):     Dyspnea:                           []Mild []Moderate []Severe  Anxiety:                             []Mild []Moderate []Severe  Fatigue:                             []Mild []Moderate []Severe  Nausea:                             []Mild []Moderate []Severe  Loss of appetite:              []Mild []Moderate []Severe  Constipation:                    []Mild []Moderate []Severe    PAINAD Score: 0  http://geriatrictoolkit.[employer].edu/cog/painad.pdf  		  Other Symptoms:  [x]All other review of systems negative     Karnofsky Performance Score/Palliative Performance Status Version 2: 40 %  http://npcrc.org/files/news/palliative_performance_scale_ppsv2.pdf    PHYSICAL EXAM:  Vital Signs Last 24 Hrs  T(C): 36.4 (24 Aug 2020 07:19), Max: 36.4 (24 Aug 2020 07:19)  T(F): 97.5 (24 Aug 2020 07:19), Max: 97.5 (24 Aug 2020 07:19)  HR: 100 (24 Aug 2020 07:19) (100 - 100)  BP: 130/76 (24 Aug 2020 07:19) (130/76 - 130/76)  RR: 21 (24 Aug 2020 07:19) (21 - 21)  SpO2: 92% (24 Aug 2020 07:19) (92% - 92%)     I&O's Summary    23 Aug 2020 07:01  -  24 Aug 2020 07:00  --------------------------------------------------------  IN: 0 mL / OUT: 850 mL / NET: -850 mL    GENERAL:  [x]Alert  [x]Oriented x 2 (person, place)  []Lethargic  []Cachexia  []Unarousable  [x]Verbal  []Non-Verbal  Behavioral:   [] Anxiety  [] Delirium [] Agitation [] Other  HEENT:  [x]Normal   []Dry mouth   []ET Tube/Trach  []Oral lesions  PULMONARY:   [x]Clear []Tachypnea  []Audible excessive secretions   []Rhonchi        []Right []Left []Bilateral  []Crackles        []Right []Left []Bilateral  []Wheezing     []Right []Left []Bilateral  CARDIOVASCULAR:    [x]Regular []Irregular []Tachy  []Justyn []Murmur []Other  GASTROINTESTINAL:  [x]Soft []Distended [x]+BS [x]Non tender []Tender []PEG []OGT/ NGT   Last BM:     GENITOURINARY: + R nephrostomy tube in place with serosanguinous fluid  []Normal [x] Incontinent   []Oliguria/Anuria   [x]Roth  MUSCULOSKELETAL:   []Normal  [x]Weakness  [x]Bed/Wheelchair bound []Edema  NEUROLOGIC:   []No focal deficits  [x]Cognitive impairment  []Dysphagia []Dysarthria []Paresis []Other   SKIN:   []Normal   []Pressure ulcer(s)  [x]Rash: Please refer to nursing notes for further documentation    CRITICAL CARE:  [ ] Shock Present  [ ]Septic [ ]Cardiogenic [ ]Neurologic [ ]Hypovolemic  [ ]  Vasopressors [ ]  Inotropes   [ ] Respiratory failure present [ ] Mechanical Ventilation [ ] Non-invasive ventilatory support [ ] High-Flow  [ ] Acute  [ ] Chronic [ ] Hypoxic  [ ] Hypercarbic [ ] Other  [ ] Other organ failure     LABS: None new                        8.4    10.35 )-----------( 354      ( 23 Aug 2020 06:17 )             27.4   08-23    134<L>  |  99  |  8   ----------------------------<  87  3.6   |  25  |  1.00    Ca    8.8      23 Aug 2020 06:17  Phos  2.5     08-23  Mg     2.0     08-23    TPro  6.2  /  Alb  2.8<L>  /  TBili  0.2  /  DBili  x   /  AST  32  /  ALT  26  /  AlkPhos  132<H>  08-23      RADIOLOGY & ADDITIONAL STUDIES: None new    PROTEIN CALORIE MALNUTRITION:   [x] PPSV2 < or = 30% [] significant weight loss [] poor nutritional intake [] anasarca [] catabolic state   Albumin, Serum: 2.8 g/dL (08-23-20 @ 06:17)   Artificial Nutrition []     REFERRALS:   []Chaplaincy  []Hospice  []Child Life  [x]Social Work  []Case management []Holistic Therapy []Physical Therapy []Dietary   Goals of Care Document:   Care Coordination Document: Progress Notes - Care Coordination [C. Provider] (08-21-20 @ 15:16)                                       Progress Notes    PROGRESS NOTE  Date & Time of Note   2020-08-21 15:15    Notes    Notes: LMSW continues to follow. Patient with abdominal mass s/p drain s/p day  3 Zosyn s/p ID f/u on Abx duration. Patient pending CT w/ Contrast scan.  Patient seen by PT recommends home PT and RW upon discharge. Patient's daughter  at bedside in agreement with home care referral to [facility] upon  discharge. LMSW will complete home care reform and will require ambulance  transportation for discharge.       Electronically signed by:  Fay Lock  Electronically signed on:  2020-08-21  15:16 GAP TEAM PALLIATIVE CARE UNIT PROGRESS NOTE:      [] Patient on hospice program.    INDICATION FOR PALLIATIVE CARE UNIT SERVICES: workup of abdominal pain, leukocytosis    INTERVAL HPI/OVERNIGHT EVENTS: No acute events overnight. Received no PRN morphine.     DNR on chart: Yes  Yes    Allergies    No Known Allergies    Intolerances    MEDICATIONS  (STANDING):  acetaminophen   Tablet .. 975 milliGRAM(s) Oral every 8 hours  cefTRIAXone   IVPB 1000 milliGRAM(s) IV Intermittent every 24 hours  donepezil 10 milliGRAM(s) Oral at bedtime  escitalopram 5 milliGRAM(s) Oral daily  ferrous    sulfate 325 milliGRAM(s) Oral daily  heparin   Injectable 5000 Unit(s) SubCutaneous every 8 hours  melatonin 3 milliGRAM(s) Oral at bedtime  senna 2 Tablet(s) Oral at bedtime  sodium chloride 0.9%. 1000 milliLiter(s) (100 mL/Hr) IV Continuous <Continuous>    MEDICATIONS  (PRN):  bisacodyl Suppository 10 milliGRAM(s) Rectal daily PRN Constipation  morphine  - Injectable 1 milliGRAM(s) IV Push every 4 hours PRN dyspnea  morphine  - Injectable 1 milliGRAM(s) IV Push every 4 hours PRN moderate to severe pain    ITEMS UNCHECKED ARE NOT PRESENT    PRESENT SYMPTOMS: [x]Unable to obtain due to poor mentation see pain ads score  Source if other than patient:  []Family   []Team     Pain: [] yes [x] no  QOL impact -   Location -                    Aggravating factors -  Quality -  Radiation -  Timing -  Severity (0-10 scale):  Minimal acceptable level (0-10 scale):     Dyspnea:                           []Mild []Moderate []Severe  Anxiety:                             []Mild []Moderate []Severe  Fatigue:                             []Mild []Moderate []Severe  Nausea:                             []Mild []Moderate []Severe  Loss of appetite:              []Mild []Moderate []Severe  Constipation:                    []Mild []Moderate []Severe    PAINAD Score: 0  http://geriatrictoolkit.[employer].edu/cog/painad.pdf  		  Other Symptoms:  [x]All other review of systems negative     Karnofsky Performance Score/Palliative Performance Status Version 2: 40 %  http://npcrc.org/files/news/palliative_performance_scale_ppsv2.pdf    PHYSICAL EXAM:  Vital Signs Last 24 Hrs  T(C): 36.4 (24 Aug 2020 07:19), Max: 36.4 (24 Aug 2020 07:19)  T(F): 97.5 (24 Aug 2020 07:19), Max: 97.5 (24 Aug 2020 07:19)  HR: 100 (24 Aug 2020 07:19) (100 - 100)  BP: 130/76 (24 Aug 2020 07:19) (130/76 - 130/76)  RR: 21 (24 Aug 2020 07:19) (21 - 21)  SpO2: 92% (24 Aug 2020 07:19) (92% - 92%)     I&O's Summary    23 Aug 2020 07:01  -  24 Aug 2020 07:00  --------------------------------------------------------  IN: 0 mL / OUT: 850 mL / NET: -850 mL    GENERAL:  [x]Alert  [x]Oriented x 2 (person, place)  []Lethargic  []Cachexia  []Unarousable  [x]Verbal  []Non-Verbal  Behavioral:   [] Anxiety  [] Delirium [] Agitation [] Other  HEENT:  [x]Normal   []Dry mouth   []ET Tube/Trach  []Oral lesions  PULMONARY:   [x]Clear []Tachypnea  []Audible excessive secretions   []Rhonchi        []Right []Left []Bilateral  []Crackles        []Right []Left []Bilateral  []Wheezing     []Right []Left []Bilateral  CARDIOVASCULAR:    [x]Regular []Irregular []Tachy  []Justyn []Murmur []Other  GASTROINTESTINAL:  [x]Soft []Distended [x]+BS [x]Non tender []Tender []PEG []OGT/ NGT   Last BM:     GENITOURINARY: + R nephrostomy tube in place with serosanguinous fluid  []Normal [x] Incontinent   []Oliguria/Anuria   [x]Roth  MUSCULOSKELETAL:   []Normal  [x]Weakness  [x]Bed/Wheelchair bound []Edema  NEUROLOGIC:   []No focal deficits  [x]Cognitive impairment  []Dysphagia []Dysarthria []Paresis []Other   SKIN:   []Normal   []Pressure ulcer(s)  [x]Rash: Please refer to nursing notes for further documentation    CRITICAL CARE:  [ ] Shock Present  [ ]Septic [ ]Cardiogenic [ ]Neurologic [ ]Hypovolemic  [ ]  Vasopressors [ ]  Inotropes   [ ] Respiratory failure present [ ] Mechanical Ventilation [ ] Non-invasive ventilatory support [ ] High-Flow  [ ] Acute  [ ] Chronic [ ] Hypoxic  [ ] Hypercarbic [ ] Other  [ ] Other organ failure     LABS: None new                        8.4    10.35 )-----------( 354      ( 23 Aug 2020 06:17 )             27.4   08-23    134<L>  |  99  |  8   ----------------------------<  87  3.6   |  25  |  1.00    Ca    8.8      23 Aug 2020 06:17  Phos  2.5     08-23  Mg     2.0     08-23    TPro  6.2  /  Alb  2.8<L>  /  TBili  0.2  /  DBili  x   /  AST  32  /  ALT  26  /  AlkPhos  132<H>  08-23      RADIOLOGY & ADDITIONAL STUDIES: None new    PROTEIN CALORIE MALNUTRITION:   [x] PPSV2 < or = 30% [] significant weight loss [] poor nutritional intake [] anasarca [] catabolic state   Albumin, Serum: 2.8 g/dL (08-23-20 @ 06:17)   Artificial Nutrition []     REFERRALS:   []Chaplaincy  []Hospice  []Child Life  [x]Social Work  []Case management []Holistic Therapy []Physical Therapy []Dietary   Goals of Care Document:   Care Coordination Document: Progress Notes - Care Coordination [C. Provider] (08-21-20 @ 15:16)                                       Progress Notes    PROGRESS NOTE  Date & Time of Note   2020-08-21 15:15    Notes    Notes: LMSW continues to follow. Patient with abdominal mass s/p drain s/p day  3 Zosyn s/p ID f/u on Abx duration. Patient pending CT w/ Contrast scan.  Patient seen by PT recommends home PT and RW upon discharge. Patient's daughter  at bedside in agreement with home care referral to [facility] upon  discharge. LMSW will complete home care reform and will require ambulance  transportation for discharge.       Electronically signed by:  Fay Lock  Electronically signed on:  2020-08-21  15:16

## 2020-08-24 NOTE — PROGRESS NOTE ADULT - ASSESSMENT
This is a 87-year-old female with HTN, dementia, recent hospitalization at [facility] found to have L pubic ramus fracture, R sacral fracture, and R UPJ dilation, recent UTI, presenting with abdominal pain and decline in functional status over the last month. Found to have leukocytosis 17.7. Pt admitted for management of pain and sepsis s/p IR drainage of 1.4 L of pus from dilated ureter.

## 2020-08-24 NOTE — PROGRESS NOTE ADULT - PROBLEM SELECTOR PLAN 3
- martinez catheter in place, draining pale yellow urine   - Given nephrostomy output concerning for blood, although not likely acute bleeding, will continue martinez today to monitor if martinez output becomes bloody. If remains clear yellow, family would be in agreement if Martinez is needed at discharge, will f/u tomorrow to assess nephrostomy output as well as urine output to do voiding trial. - martinez catheter in place, draining pale yellow urine. TOV today. If patient does not pass TOV, family ok with managing martinez at home

## 2020-08-24 NOTE — PROGRESS NOTE ADULT - SUBJECTIVE AND OBJECTIVE BOX
Follow Up: UTI    Interval History/ROS: Feels well, afebrile, denies pain. Gives me a thumbs up.     Allergies  No Known Allergies        ANTIMICROBIALS:  trimethoprim  160 mG/sulfamethoxazole 800 mG 1 once      OTHER MEDS:  acetaminophen   Tablet .. 975 milliGRAM(s) Oral every 8 hours  bisacodyl Suppository 10 milliGRAM(s) Rectal daily PRN  donepezil 10 milliGRAM(s) Oral at bedtime  escitalopram 5 milliGRAM(s) Oral daily  ferrous    sulfate 325 milliGRAM(s) Oral daily  heparin   Injectable 5000 Unit(s) SubCutaneous every 8 hours  melatonin 3 milliGRAM(s) Oral at bedtime  morphine  - Injectable 1 milliGRAM(s) IV Push every 4 hours PRN  morphine  - Injectable 1 milliGRAM(s) IV Push every 4 hours PRN  senna 2 Tablet(s) Oral at bedtime  sodium chloride 0.9%. 1000 milliLiter(s) IV Continuous <Continuous>      Vital Signs Last 24 Hrs  T(C): 36.4 (24 Aug 2020 07:19), Max: 36.4 (24 Aug 2020 07:19)  T(F): 97.5 (24 Aug 2020 07:19), Max: 97.5 (24 Aug 2020 07:19)  HR: 100 (24 Aug 2020 07:19) (100 - 100)  BP: 130/76 (24 Aug 2020 07:19) (130/76 - 130/76)  BP(mean): --  RR: 21 (24 Aug 2020 07:19) (21 - 21)  SpO2: 92% (24 Aug 2020 07:19) (92% - 92%)    Physical Exam:  General: awake, alert, non toxic  Head: atraumatic, normocephalic  Eye: normal sclera and conjunctiva  Cardio: regular rate   Respiratory: nonlabored on room air, clear bilaterally, no wheezing  abd: soft, bowel sounds present, no tenderness  : martinez. right nephrostomy. no CVAT or no suprapubic tenderness.   Skin: no rash  Neurologic: no focal deficit  psych: normal affect                          8.4    10.35 )-----------( 354      ( 23 Aug 2020 06:17 )             27.4       08-23    134<L>  |  99  |  8   ----------------------------<  87  3.6   |  25  |  1.00    Ca    8.8      23 Aug 2020 06:17  Phos  2.5     08-23  Mg     2.0     08-23    TPro  6.2  /  Alb  2.8<L>  /  TBili  0.2  /  DBili  x   /  AST  32  /  ALT  26  /  AlkPhos  132<H>  08-23          MICROBIOLOGY:  Culture - Urine (collected 08-19-20 @ 22:05)  Source: .Urine Nephrostomy - Right  Final Report (08-21-20 @ 16:55):    Moderate Escherichia coli  Organism: Escherichia coli (08-21-20 @ 16:55)  Organism: Escherichia coli (08-21-20 @ 16:55)      -  Amikacin: S <=16      -  Amoxicillin/Clavulanic Acid: S <=8/4      -  Ampicillin: R >16 These ampicillin results predict results for amoxicillin      -  Ampicillin/Sulbactam: R >16/8 Enterobacter, Citrobacter, and Serratia may develop resistance during prolonged therapy (3-4 days)      -  Aztreonam: S <=4      -  Cefazolin: R >16 (MIC_CL_COM_ENTERIC_CEFAZU) For uncomplicated UTI with K. pneumoniae, E. coli, or P. mirablis: RUSTY <=16 is sensitive and RUSTY >=32 is resistant. This also predicts results for oral agents cefaclor, cefdinir, cefpodoxime, cefprozil, cefuroxime axetil, cephalexin and locarbef for uncomplicated UTI. Note that some isolates may be susceptible to these agents while testing resistant to cefazolin.      -  Cefepime: S <=2      -  Cefoxitin: S <=8      -  Ceftriaxone: S <=1 Enterobacter, Citrobacter, and Serratia may develop resistance during prolonged therapy      -  Ciprofloxacin: S <=0.25      -  Ertapenem: S <=0.5      -  Gentamicin: S <=2      -  Imipenem: S <=1      -  Levofloxacin: S <=0.5      -  Meropenem: S <=1      -  Nitrofurantoin: S <=32 Should not be used to treat pyelonephritis      -  Piperacillin/Tazobactam: S <=8      -  Tigecycline: S <=2      -  Tobramycin: S <=2      -  Trimethoprim/Sulfamethoxazole: S <=0.5/9.5      Method Type: RUSTY    Culture - Urine (collected 08-18-20 @ 01:12)  Source: .Urine Clean Catch (Midstream)  Final Report (08-18-20 @ 20:57):    <10,000 CFU/mL Normal Urogenital Josefina    Culture - Blood (collected 08-18-20 @ 00:14)  Source: .Blood Blood  Final Report (08-23-20 @ 01:01):    No Growth Final    RADIOLOGY:  Images below reviewed personally  CT Abdomen and Pelvis w/ IV Cont (08.21.20 @ 15:53)   Severely hydronephrotic right kidney with decreased dilatation after placement of right percutaneous gastrostomy tube. Small new hyperdense material around the catheter, probably small amount of hemorrhage. Diffuse urothelial enhancement is present, suspicious for superimposed infection.  Abnormal enhancing urothelial tissue at the ureteropelvic junction, possibly neoplasm.    CT Abdomen and Pelvis w/ IV Cont (08.18.20 @ 09:57)   Marked right hydronephrosis with dilatation of the right renal pelvis, significantly increased since 3/5/2019. There is associated right renal cortical atrophy. The level of the obstruction appears to be at the ureteropelvic junction. An underlying mass cannot be excluded.  Cystic right adnexal mass, unchanged.  Apparent medial rupture of the partially imaged right breast implant.

## 2020-08-24 NOTE — PROGRESS NOTE ADULT - PROBLEM SELECTOR PLAN 2
- Pt w/ recent imaging at [facility] found to have L pubic ramus fracture, R sacral fracture, and R UPJ dilation size of grapefruit due to obstruction with R kidney chronically shriveled.  Dr. Hood spoke to Urology, no surgical intervention currently being planned due to shriveled kidney state   - CT 8/18/20 here showed further increased right renal collecting system and right renal pelvis size  - Urology consulted, pt not a candidate for nephrectomy.   - s/p R nephrostomy tube by IR 8/19. Nephrostomy tube draining well   - Continue Tylenol for mild pain, Morphine 1 mg q4 PRN for moderate-severe pain  - Repeat CT scan 8/21 showing improvement in right kidney dilation, no new collections

## 2020-08-24 NOTE — PROGRESS NOTE ADULT - PROBLEM SELECTOR PROBLEM 2
Abdominal pain, unspecified abdominal location

## 2020-08-24 NOTE — PROGRESS NOTE ADULT - ASSESSMENT
87F with dementia admitted 8/17/20 for functional decline.   Known chronic right atrophic kidney with hydroureteronephrosis but now found to be markedly more dilated. Aside from leukocytosis there hasn't been much to suggest infection but IR placed a nephrostomy 8/19 and drained 1.4L of purulent urine growing E. coli.   No plans for further/aggressive intervention.   Doing well, afebrile, cheerful, leukocytosis normalized.     Suggest  -can change to PO Bactrim 1DS BID last day Wed 8/26 for a 7-day course from drainage   -drain management per IR; I favor leaving in the drain as source control seem to be is the main issue   -unclear benefit of suppressive prophylactic antibiotics but can use Bactrim half of a SS tab daily after the above course; per family only gets about 1-2 UTIs a year but she has chronic obstruction, can expect bacteruria and her urologist already had her on prophylactic antibiotics (unsure which one)     Spoke with primary team     Julio Rea MD   Infectious Disease   Pager [phone]   After 5PM and on weekends please page fellow on call or call [phone]

## 2020-08-24 NOTE — PROGRESS NOTE ADULT - PROBLEM SELECTOR PLAN 9
Diet: soft, ensure added, nutrition consult   DVT PPX: heparin SQ   GOC: DNR/DNI   Dispo: pending clinical improvement Diet: soft, ensure added, nutrition consult   DVT PPX: heparin SQ   GOC: DNR/DNI   Dispo: home hospice referral pending

## 2020-08-24 NOTE — PROGRESS NOTE ADULT - ATTENDING COMMENTS
AS above, will perform right PCN for palliation.
Pt seen with fellow  Agree with above plan
I have personally seen and examined this patient and agree with the above assessment and plan, which I have reviewed and edited where appropriate.   s/p successful placement of right nephrostomy tube for drainage of collection.  To complete course antibiotics.  PT and dental evaluations pending.  Consider re-imaging determine cause of obstruction.  Encourage PO intake - ensure ordered.  WBC trending downwards.
I have personally seen and examined this patient and agree with the above assessment and plan, which I have reviewed and edited where appropriate.   ID input appreciated.  Discussion with son Yoandy, Dr. Hood, Fay Lock SIA for dc planning, hopefully today if possible or tomorrow if services and equipment can be put in place.
Pt seen with fellow  Agree with above plan  GOC: plan for possible d/c on Monday if things continue to go well and pt can be transitioned to Po regimen per ID recommendations  Symptoms: atc Tylenol as above  Will monitor output from nephrostomy tube as it is blood tinged, for now will keep Roth and reassess tomorrow to determine if voiding trial will be done, family is aware and they would be in agreement of pt being d/c with Roth if needed.   Plan discussed with PCP
I have personally seen and examined this patient and agree with the above assessment and plan, which I have reviewed and edited where appropriate.   s/p IR drainage of infected collection right ureter/kidney.  Labs are normalizing.  PT working with patient but she is refusing to get out of bed.  Family/aid at bedside to assist.  Pending sensitivities of culture to determine antibiotic course - narrow spectrum, transition to PO in anticipation of discharge.  Repeat CT imaging to r/o residual collections and/or cause of obstruction.  Discussed with family at bedside.  Likely to be DC'd early next week, completing IV antibiotics.  Spoke with Dr. Olmstead who endorses not appreciating a cause of obstruction on recent CT or ultrasound during procedure.  Recommended repeat CT with IV contrast to further elucidate.  To discuss with Dr. Hood.
I have personally seen and examined this patient and agree with the above assessment and plan, which I have reviewed and edited where appropriate.   87 year old female with abdominal pain, known recent upj swelling with normal renal fxn.  High WBC/procal despite rx with cipro - coverage changed to ceftriaxone, then zosyn.  Imaging revealed increased hydroureter.  Discussions with family and PCP/urology - patient to have IR drainage for pain management and management of possible infection.  Hopefully will elucidate cause of obstruction.
I have personally seen and examined this patient and agree with the above assessment and plan, which I have reviewed and edited where appropriate.   s/p IR drainage of 1400cc purulent material from obstructed ureter.  Spoke with PCP and family at length regarding goals post procedure.  ID consulted, input appreciated.  Continue to monitor.  Replete potassium.  check mag with next blood draw.

## 2020-08-24 NOTE — CONSULT NOTE ADULT - SUBJECTIVE AND OBJECTIVE BOX
Patient is a 87y old Female who presents with a chief complaint of abdominal pain (24 Aug 2020 16:20). Dental was consulted due to fractured tooth in the maxillary anterior region.      HPI:  This is a 87-year-old female with HTN, dementia, recent hospitalization at [place] found to have L pubic ramus fracture, R sacral fracture, and R UPJ dilation, sent by. Dr. Hood for abdominal pain and recent decline in functional status. As per outpatient provider Dr. Hood, pt had a vertebral compression fracture 1 year ago with no loss of function at that time. However one month ago, patient developed abdominal pain and decline in functional status.  Pt was seen at [facility] and diagnosed with sciatica and sent home. On review of imaging, pt found to have L pubic ramus fracture, R sacral fracture, and R UPJ dilation size of grapefruit due to obstruction with R kidney chronically shriveled. Dr. Hood spoke to Urology who advised against surgery given pt's chronically shriveled R kidney; family also opting for more conservative management but would like to address any reversible causes of pain. During that time, labs and additional imaging reported to be WNL.     As per son, pt has been having decreased PO intake for the last month along with loss of function. Pt found to have UTI outpatient, currently on cipro day 6/7; son also reports that pt was getting PPX antibiotics 3 times a week prior. Pt has had abdominal pain for which they were treating with Tylenol with workup noted above. Also reports that pt has appeared more dyspneic lately. No melena, hematochezia, cough, diarrhea, constipation, fever.     Pt's baseline status is ambulatory with a cane, has a HHA that assists with ADLs, lives with son, dementia with significant short term memory loss.     In the ED, pt's vitals were T 97.5 HR 90 /71 RR 24 POX 94% 2L NC. (17 Aug 2020 15:02)      PAST MEDICAL & SURGICAL HISTORY:  Dementia  HTN (hypertension)  History of hip surgery      MEDICATIONS  (STANDING):  acetaminophen   Tablet .. 975 milliGRAM(s) Oral every 8 hours  amLODIPine   Tablet 10 milliGRAM(s) Oral daily  donepezil 10 milliGRAM(s) Oral at bedtime  escitalopram 5 milliGRAM(s) Oral daily  ferrous    sulfate 325 milliGRAM(s) Oral daily  heparin   Injectable 5000 Unit(s) SubCutaneous every 8 hours  melatonin 3 milliGRAM(s) Oral at bedtime  senna 2 Tablet(s) Oral at bedtime  sodium chloride 0.9%. 1000 milliLiter(s) (100 mL/Hr) IV Continuous <Continuous>    MEDICATIONS  (PRN):  bisacodyl Suppository 10 milliGRAM(s) Rectal daily PRN Constipation  morphine  - Injectable 1 milliGRAM(s) IV Push every 4 hours PRN dyspnea  morphine  - Injectable 1 milliGRAM(s) IV Push every 4 hours PRN moderate to severe pain      Allergies    No Known Allergies    Intolerances        FAMILY HISTORY:  FH: dementia      Vital Signs Last 24 Hrs  T(C): 36.6 (24 Aug 2020 16:50), Max: 36.6 (24 Aug 2020 16:50)  T(F): 97.9 (24 Aug 2020 16:50), Max: 97.9 (24 Aug 2020 16:50)  HR: 89 (24 Aug 2020 16:50) (89 - 100)  BP: 108/64 (24 Aug 2020 16:50) (108/64 - 130/76)  BP(mean): --  RR: 20 (24 Aug 2020 16:50) (20 - 21)  SpO2: 94% (24 Aug 2020 16:50) (92% - 94%)      IOE:  permanent dentition, grossly intact with multiple carious teeth           tongue/FOM WNL           labial/buccal mucosa WNL           (-) percussion           (-) palpation           (-) swelling       Mobility: none  Caries: occlusal surfaces of #13 and #14    Radiographs: Exam was done at bedside because patient was preparing for discharge, so no radiographs were taken.    LABS:                        8.4    10.35 )-----------( 354      ( 23 Aug 2020 06:17 )             27.4     08-23    134<L>  |  99  |  8   ----------------------------<  87  3.6   |  25  |  1.00    Ca    8.8      23 Aug 2020 06:17  Phos  2.5     08-23  Mg     2.0     08-23    TPro  6.2  /  Alb  2.8<L>  /  TBili  0.2  /  DBili  x   /  AST  32  /  ALT  26  /  AlkPhos  132<H>  08-23      Platelet Count - Automated: 354 K/uL [150 - 400] (08-23 @ 06:17)      RADIOLOGY & ADDITIONAL STUDIES:   EXAM: CT ORBITS   PROCEDURE DATE: 02/12/2017   INTERPRETATION: CT orbits without contrast   History injury   There is mild superficial right-sided periorbital and maxillary soft tissue   swelling. There is no fracture. There are small air-fluid levels in the   maxillary sinuses. The orbital contents are grossly normal.   IMPRESSION:   As above   VICKEY CRAIG M.D., ATTENDING RADIOLOGIST       ASSESSMENT: Patient fractured maxillary left central incisor (tooth #9) at the gingival level. Patient has dementia so cannot accurately report any signs of pain or discomfort. Intraorally, there appears to be no signs of acute infection such as swelling, asymmetry, or pain on palpation. Dental care needed is not emergent. Patient can return to their general dentist after discharge for comprehensive dental treatment.    PROCEDURE: Limited oral evaluation.  Verbal and written consent given.     RECOMMENDATIONS:   1) Dental F/U with outpatient dentist for comprehensive dental care.       Radha Ventura DDS, #[id number]

## 2020-08-24 NOTE — PROGRESS NOTE ADULT - PROBLEM SELECTOR PLAN 5
- Hb 8.4 (8/23), Hb uptrend from low 7.1. Likely multifactorial anemia due to inflammation and dilution from IV fluids. Iron studies show decreased TIBC, low iron. On ferrous sulfate 325   - FOBT positive, for now conservative management will continue to f/u h/h   - Monitor AM CBC. Transfuse if hgb <7 or s/s of active bleeding  - Folate, B12, TSH WNL   - Maintain active T&S, next to be drawn 8/26 - Hb 8.4 (8/23), Hb uptrend from low 7.1. Likely multifactorial anemia due to inflammation and dilution from IV fluids. Iron studies show decreased TIBC, low iron. On ferrous sulfate 325   - FOBT positive, for now conservative management. Family not pursuing colonoscopy  - Monitor AM CBC. Transfuse if hgb <7 or s/s of active bleeding  - Folate, B12, TSH WNL   - Maintain active T&S, next to be drawn 8/26

## 2020-08-24 NOTE — PROGRESS NOTE ADULT - PROBLEM SELECTOR PLAN 1
- nephrostomy with minimal serosanguinous fluid from nephrostomy placement on 8/19, especially with increasing H/H today, but will continue to monitor closely  -leukocytosis resolved, hemodynamically stable   - s/p R nephrostomy tube with 1400 cc purulent drainage from R kidney mass 8/19   - Cx from nephrostomy showed moderate E. Coli. On zosyn until 8/21, narrowed to ceftriaxone based on sensitivities. Per ID, continue IV antibiotics while inpatient, can switch to PO today, duration to be determined   - On Ceftriaxone (8/22-. Previously Zosyn 3.375 mg q8 (8/18 - 8/21 ).   - Repeat CT Abd w/ contrast 8/21 showing less dilated right kidney after nephrostomy placement with ?small hemorrhage around catheter, abnormal enhancing urothelial tissue at ureteropelvic junction which may represent malignancy  - Dental consult called for possible oral etiology of infection - nephrostomy with minimal serosanguinous fluid from nephrostomy placement on 8/19. With continued drainage, nephrostomy tube will likely be kept in place upon discharge  -leukocytosis resolved, hemodynamically stable   - s/p R nephrostomy tube with 1400 cc purulent drainage from R kidney mass 8/19   - Cx from nephrostomy showed moderate E. Coli. On zosyn until 8/21, narrowed to ceftriaxone based on sensitivities. Per ID, can change to Bactrim until 8/26 and ID to discuss prophylactic abx  - On Ceftriaxone (8/22-. Previously Zosyn 3.375 mg q8 (8/18 - 8/21 )  - Repeat CT Abd w/ contrast 8/21 showing less dilated right kidney after nephrostomy placement with ?small hemorrhage around catheter, abnormal enhancing urothelial tissue at ureteropelvic junction which may represent malignancy  - Dental consult called for possible oral etiology of infection

## 2020-08-24 NOTE — PROGRESS NOTE ADULT - PROBLEM SELECTOR PLAN 4
-Patient with chronic pain from osteoarthritis per Dr. Hood, reportedly controlled on Tylenol 1000 mg three times a day at home   -Continue Tylenol to 975 mg q 8 hr ATC while inpatient

## 2020-08-24 NOTE — PROGRESS NOTE ADULT - SUBJECTIVE AND OBJECTIVE BOX
pt seen 8/24/20 at time - 1351    Interventional Radiology    S/P right nephrostomy tube placement on 8/19/2020 by IR Dr. Roberth Olmstead, POD # 5.     Pt is confused and unable to answer questions.      Vital Signs Last 24 Hrs  T(C): 36.4 (24 Aug 2020 07:19), Max: 36.4 (24 Aug 2020 07:19)  T(F): 97.5 (24 Aug 2020 07:19), Max: 97.5 (24 Aug 2020 07:19)  HR: 100 (24 Aug 2020 07:19) (100 - 100)  BP: 130/76 (24 Aug 2020 07:19) (130/76 - 130/76)  BP(mean): --  RR: 21 (24 Aug 2020 07:19) (21 - 21)  SpO2: 92% (24 Aug 2020 07:19) (92% - 92%)    General Appearance: NAD, seen sitting in chair at bedside with female PCA present.    Procedure Site (Right flank): catheter intact and draining urine to leg bag.  flushed without difficulty.      I&O's Detail      Right Nephrostomy Tube: 150 mL 8/24/20 7AM      LABS:                        8.4    10.35 )-----------( 354      ( 23 Aug 2020 06:17 )             27.4     08-23    134<L>  |  99  |  8   ----------------------------<  87  3.6   |  25  |  1.00    Ca    8.8      23 Aug 2020 06:17  Phos  2.5     08-23  Mg     2.0     08-23    TPro  6.2  /  Alb  2.8<L>  /  TBili  0.2  /  DBili  x   /  AST  32  /  ALT  26  /  AlkPhos  132<H>  08-23

## 2020-08-24 NOTE — DISCHARGE NOTE NURSING/CASE MANAGEMENT/SOCIAL WORK - NSDCFUADDAPPT_GEN_ALL_CORE_FT
Follow up with Interventional Radiology, Dr. Roberth Olmstead, for routine exchange of your right nephrostomy tube every 3 months.  Call IR at [phone] to schedule the procedure.

## 2020-08-24 NOTE — GOALS OF CARE CONVERSATION - ADVANCED CARE PLANNING - CONVERSATION DETAILS
HCN RN spoke with son Yoandy via telephone, explained hospice home services vs in-pt services with return understanding. HCN consents emailed to son as requested. Admission to hospice is pending signed consents.

## 2020-08-26 ENCOUNTER — APPOINTMENT (OUTPATIENT)
Dept: GERIATRICS | Facility: HOME HEALTH | Age: 85
End: 2020-08-26

## 2020-08-26 ENCOUNTER — APPOINTMENT (OUTPATIENT)
Dept: GERIATRICS | Facility: CLINIC | Age: 85
End: 2020-08-26
Payer: MEDICARE

## 2020-08-26 DIAGNOSIS — S32.10XA UNSPECIFIED FRACTURE OF SACRUM, INITIAL ENCOUNTER FOR CLOSED FRACTURE: ICD-10-CM

## 2020-08-26 PROCEDURE — 99349 HOME/RES VST EST MOD MDM 40: CPT

## 2020-08-26 RX ORDER — CHLORHEXIDINE GLUCONATE 4 %
325 (65 FE) LIQUID (ML) TOPICAL EVERY OTHER DAY
Qty: 45 | Refills: 1 | Status: ACTIVE | COMMUNITY
Start: 2020-08-26

## 2020-09-02 PROBLEM — S32.10XA SACRAL FRACTURE: Status: ACTIVE | Noted: 2019-03-06

## 2020-09-03 NOTE — HISTORY OF PRESENT ILLNESS
[FreeTextEntry1] : 88 yo followup from July 5 ED visit.  Randy radiologist and reviewed CT scan of chest and abd. \par \par Sooke with Dr. Ivey who explained he reviewed CTscan and chest has emphysema, no masses, no PEs.  CT abdomen showed Right UPJ dilation due to obstruction with right kidney chronically shriveled. Dilatation is size of grapefruit and should be felt antreriorly on abd exam. Also had right sacral fracture and left pubic ramus fracture. Diverticulitis is minimal but present. \par \par Pt not herself, weak in bed and complains of pain on right back side.  Denies abd pain. Pt not eating well.\par \par //80 P-95 pulse ox 99 on room air

## 2020-09-03 NOTE — PHYSICAL EXAM
[General Appearance - Alert] : alert [General Appearance - In No Acute Distress] : in no acute distress [Sclera] : the sclera and conjunctiva were normal [PERRL With Normal Accommodation] : pupils were equal in size, round, and reactive to light [Extraocular Movements] : extraocular movements were intact [Normal Oral Mucosa] : normal oral mucosa [No Oral Pallor] : no oral pallor [No Oral Cyanosis] : no oral cyanosis [Outer Ear] : the ears and nose were normal in appearance [Oropharynx] : The oropharynx was normal [Auscultation Breath Sounds / Voice Sounds] : lungs were clear to auscultation bilaterally [Heart Rate And Rhythm] : heart rate was normal and rhythm regular [Heart Sounds] : normal S1 and S2 [Heart Sounds Gallop] : no gallops [Murmurs] : no murmurs [Heart Sounds Pericardial Friction Rub] : no pericardial rub [Full Pulse] : the pedal pulses are present [Edema] : there was no peripheral edema [Bowel Sounds] : normal bowel sounds [Abdomen Soft] : soft [Abdomen Tenderness] : non-tender [Abdomen Mass (___ Cm)] : no abdominal mass palpated [External Female Genitalia] : normal external genitalia [Urethral Meatus] : normal urethra [Urinary Bladder Findings] : the bladder was normal on palpation [Cervical Lymph Nodes Enlarged Posterior Bilaterally] : posterior cervical [Cervical Lymph Nodes Enlarged Anterior Bilaterally] : anterior cervical [Supraclavicular Lymph Nodes Enlarged Bilaterally] : supraclavicular [Axillary Lymph Nodes Enlarged Bilaterally] : axillary [Femoral Lymph Nodes Enlarged Bilaterally] : femoral [Inguinal Lymph Nodes Enlarged Bilaterally] : inguinal [No CVA Tenderness] : no ~M costovertebral angle tenderness [No Spinal Tenderness] : no spinal tenderness [Abnormal Walk] : normal gait [Nail Clubbing] : no clubbing  or cyanosis of the fingernails [Musculoskeletal - Swelling] : no joint swelling seen [Motor Tone] : muscle strength and tone were normal [Skin Color & Pigmentation] : normal skin color and pigmentation [Skin Turgor] : normal skin turgor [] : no rash [Deep Tendon Reflexes (DTR)] : deep tendon reflexes were 2+ and symmetric [Sensation] : the sensory exam was normal to light touch and pinprick [No Focal Deficits] : no focal deficits [Oriented To Time, Place, And Person] : oriented to person, place, and time [Impaired Insight] : insight and judgment were intact [Affect] : the affect was normal [FreeTextEntry1] : right sacral iliac pain point tenderness

## 2020-09-22 ENCOUNTER — APPOINTMENT (OUTPATIENT)
Dept: UROLOGY | Facility: CLINIC | Age: 85
End: 2020-09-22
Payer: MEDICARE

## 2020-09-22 PROCEDURE — 99214 OFFICE O/P EST MOD 30 MIN: CPT | Mod: 95,GW

## 2020-09-22 NOTE — PHYSICAL EXAM
[General Appearance - Well Developed] : well developed [General Appearance - Well Nourished] : well nourished [Normal Appearance] : normal appearance [Well Groomed] : well groomed [General Appearance - In No Acute Distress] : no acute distress [FreeTextEntry1] : appears to be in good spirits, comfortable [] : no respiratory distress [Respiration, Rhythm And Depth] : normal respiratory rhythm and effort [Exaggerated Use Of Accessory Muscles For Inspiration] : no accessory muscle use

## 2020-09-22 NOTE — HISTORY OF PRESENT ILLNESS
[Home] : at home, [unfilled] , at the time of the visit. [Other Location: e.g. Home (Enter Location, City,State)___] : at [unfilled] [Family Member] : family member [Formal Caregiver] : formal caregiver [Verbal consent obtained from patient] : the patient, [unfilled] [FreeTextEntry3] : Yoandy Kolb (son) [FreeTextEntry1] : The patient-doctor relationship has been established in a face to face fashion via real time video/audio HIPAA compliant communication using telemedicine software. The patient's identity has been confirmed. The patient was previously emailed a copy of the telemedicine consent. They have had a chance to review and has now given verbal consent and has requested care to be assessed and treated via telemedicine. They understand there may be limitations in this process, and that they may need further followup care in the office and/or hospital settings.\par \par Verbal consent given on Sep 22 2020  2:00PM\par \par KAMALJIT ELLISON is a 87 year F who presents for f/u after hospitalization. Nonfunctional right kidney, hugely hydronephrotic, which required placement of nephrostomy due to infection- pus drained from kidney. Pt currently doing well, though tube presents issues of management as pt with some dementia baseline, doesn't recall it's there.\par \par Currently, caregivers take care of tube, including with daily flushes with saline. Minimal output at this time.\par \par CT reviewed- hugely hydronephrotic and nonfunctional right kidney.\par \par 09/22/2020 \par \par The patient denies fevers, chills, nausea and/or vomiting, and no unexplained weight loss.\par \par All pertinent parts of the patient PFSH (past medical, family, and social histories), laboratory, radiological studies and available physician notes were reviewed prior to starting the face-to-face portion of the telemedicine visit. Questionnaire results, where appropriate, were discussed with the patient.\par

## 2020-09-22 NOTE — ASSESSMENT
[FreeTextEntry1] : CT scan, underlying pathology, future concerns for infection in nonfunctional/obstructed kidney all reviewed at length. Primary options for management include removing nephrostomy (not advised, as obstructive nonfunctional kidney will likely end up same issue of infection as seen initially in hospital), nephrectomy (definitive, but sig operative risks in 86 yo with dementia, but if operative risks deemed low and patient not frail and at high risk, is an option), continued nephrostomy with exchanges in office or with radiology, exchange of nephrostomy to NU catheter to allow internal drainage to bladder but maintain external access with cap for flushing and simplified tube exchange), internal stent placement (internal drainage but typically requires changes also on intermittent basis and often in OR for cysto), or combination renal artery embolization (angiogram) and instillation of abx directly via nephrostomy before removing nephrostomy (would be inpt stay, and at risk for embolization febrile syndrome.\par \par Pt's son verbalizes understandings, had opportunity to ask questions.\par Will consider, d/w Dr. Hood, and call

## 2020-10-28 ENCOUNTER — RX RENEWAL (OUTPATIENT)
Age: 85
End: 2020-10-28

## 2020-10-29 ENCOUNTER — APPOINTMENT (OUTPATIENT)
Dept: UROLOGY | Facility: CLINIC | Age: 85
End: 2020-10-29
Payer: MEDICARE

## 2020-10-29 VITALS
OXYGEN SATURATION: 94 % | DIASTOLIC BLOOD PRESSURE: 69 MMHG | SYSTOLIC BLOOD PRESSURE: 105 MMHG | HEART RATE: 93 BPM | HEIGHT: 64 IN | BODY MASS INDEX: 21.51 KG/M2 | WEIGHT: 126 LBS

## 2020-10-29 DIAGNOSIS — N13.5 CROSSING VESSEL AND STRICTURE OF URETER W/OUT HYDRONEPHROSIS: ICD-10-CM

## 2020-10-29 PROCEDURE — 99213 OFFICE O/P EST LOW 20 MIN: CPT

## 2020-11-02 ENCOUNTER — RX RENEWAL (OUTPATIENT)
Age: 85
End: 2020-11-02

## 2020-11-02 PROBLEM — N13.5 URETEROPELVIC JUNCTION OBSTRUCTION: Status: ACTIVE | Noted: 2020-07-15

## 2020-11-02 NOTE — HISTORY OF PRESENT ILLNESS
[FreeTextEntry1] : Pt returns with son-- minimal drainage from right nephrostomy. Pt 88 yo female with dementia, non-functional right kidney with sig dilation and presentation with infection. Drained; we discussed poptions for management long term last appt (telehealth).\par \par No fever, flank pain. Neph with minimal output [None] : no symptoms

## 2020-11-02 NOTE — PHYSICAL EXAM
[General Appearance - Well Developed] : well developed [General Appearance - Well Nourished] : well nourished [Normal Appearance] : normal appearance [Well Groomed] : well groomed [General Appearance - In No Acute Distress] : no acute distress [Abdomen Soft] : soft [Abdomen Tenderness] : non-tender [Costovertebral Angle Tenderness] : no ~M costovertebral angle tenderness [] : no respiratory distress [Respiration, Rhythm And Depth] : normal respiratory rhythm and effort [Exaggerated Use Of Accessory Muscles For Inspiration] : no accessory muscle use [FreeTextEntry1] : n [Normal Station and Gait] : the gait and station were normal for the patient's age [No Focal Deficits] : no focal deficits

## 2020-11-02 NOTE — ASSESSMENT
[FreeTextEntry1] : D/w son options for management; reviewed all, including nephrectomy, nephrostomy exchange, change to NU catheter, internal stent placement, renal artery embolization and instillation of abx, as discussed last appt.\par \par He prefers to proceed with change to right NU catheter so we can cap, exchange in office without anesthesia.  Plan for irrigation to continue with NU cath in place.

## 2020-11-06 ENCOUNTER — NON-APPOINTMENT (OUTPATIENT)
Age: 85
End: 2020-11-06

## 2020-11-08 ENCOUNTER — INPATIENT (INPATIENT)
Facility: HOSPITAL | Age: 85
LOS: 3 days | Discharge: HOSPICE | DRG: 699 | End: 2020-11-12
Attending: STUDENT IN AN ORGANIZED HEALTH CARE EDUCATION/TRAINING PROGRAM | Admitting: STUDENT IN AN ORGANIZED HEALTH CARE EDUCATION/TRAINING PROGRAM
Payer: OTHER MISCELLANEOUS

## 2020-11-08 VITALS
RESPIRATION RATE: 18 BRPM | HEART RATE: 75 BPM | SYSTOLIC BLOOD PRESSURE: 128 MMHG | OXYGEN SATURATION: 90 % | TEMPERATURE: 98 F | DIASTOLIC BLOOD PRESSURE: 67 MMHG

## 2020-11-08 DIAGNOSIS — T83.022A DISPLACEMENT OF NEPHROSTOMY CATHETER, INITIAL ENCOUNTER: ICD-10-CM

## 2020-11-08 DIAGNOSIS — F03.90 UNSPECIFIED DEMENTIA, UNSPECIFIED SEVERITY, WITHOUT BEHAVIORAL DISTURBANCE, PSYCHOTIC DISTURBANCE, MOOD DISTURBANCE, AND ANXIETY: ICD-10-CM

## 2020-11-08 DIAGNOSIS — Z98.890 OTHER SPECIFIED POSTPROCEDURAL STATES: Chronic | ICD-10-CM

## 2020-11-08 DIAGNOSIS — Z51.5 ENCOUNTER FOR PALLIATIVE CARE: ICD-10-CM

## 2020-11-08 LAB — SARS-COV-2 RNA SPEC QL NAA+PROBE: SIGNIFICANT CHANGE UP

## 2020-11-08 PROCEDURE — 99223 1ST HOSP IP/OBS HIGH 75: CPT | Mod: GC

## 2020-11-08 RX ORDER — ESCITALOPRAM OXALATE 10 MG/1
5 TABLET, FILM COATED ORAL DAILY
Refills: 0 | Status: DISCONTINUED | OUTPATIENT
Start: 2020-11-08 | End: 2020-11-08

## 2020-11-08 RX ORDER — ESCITALOPRAM OXALATE 10 MG/1
5 TABLET, FILM COATED ORAL DAILY
Refills: 0 | Status: DISCONTINUED | OUTPATIENT
Start: 2020-11-08 | End: 2020-11-12

## 2020-11-08 RX ORDER — OXYCODONE HYDROCHLORIDE 5 MG/1
5 TABLET ORAL EVERY 4 HOURS
Refills: 0 | Status: DISCONTINUED | OUTPATIENT
Start: 2020-11-08 | End: 2020-11-12

## 2020-11-08 RX ORDER — AMLODIPINE BESYLATE 2.5 MG/1
10 TABLET ORAL DAILY
Refills: 0 | Status: DISCONTINUED | OUTPATIENT
Start: 2020-11-08 | End: 2020-11-08

## 2020-11-08 RX ORDER — ACETAMINOPHEN 500 MG
650 TABLET ORAL EVERY 6 HOURS
Refills: 0 | Status: DISCONTINUED | OUTPATIENT
Start: 2020-11-08 | End: 2020-11-12

## 2020-11-08 RX ORDER — INFLUENZA VIRUS VACCINE 15; 15; 15; 15 UG/.5ML; UG/.5ML; UG/.5ML; UG/.5ML
0.5 SUSPENSION INTRAMUSCULAR ONCE
Refills: 0 | Status: COMPLETED | OUTPATIENT
Start: 2020-11-08 | End: 2020-11-12

## 2020-11-08 RX ORDER — FERROUS SULFATE 325(65) MG
325 TABLET ORAL DAILY
Refills: 0 | Status: DISCONTINUED | OUTPATIENT
Start: 2020-11-08 | End: 2020-11-08

## 2020-11-08 RX ORDER — DONEPEZIL HYDROCHLORIDE 10 MG/1
10 TABLET, FILM COATED ORAL AT BEDTIME
Refills: 0 | Status: DISCONTINUED | OUTPATIENT
Start: 2020-11-08 | End: 2020-11-12

## 2020-11-08 RX ADMIN — DONEPEZIL HYDROCHLORIDE 10 MILLIGRAM(S): 10 TABLET, FILM COATED ORAL at 22:20

## 2020-11-08 NOTE — H&P ADULT - NSHPPHYSICALEXAM_GEN_ALL_CORE
ICU Vital Signs Last 24 Hrs  T(C): 36.5 (08 Nov 2020 12:30), Max: 36.5 (08 Nov 2020 12:30)  T(F): 97.7 (08 Nov 2020 12:30), Max: 97.7 (08 Nov 2020 12:30)  HR: 75 (08 Nov 2020 12:30) (75 - 75)  BP: 128/67 (08 Nov 2020 12:30) (128/67 - 128/67)  BP(mean): --  ABP: --  ABP(mean): --  RR: 18 (08 Nov 2020 12:30) (18 - 18)  SpO2: --    PHYSICAL EXAM:  GENERAL: NAD, well-developed  HEAD:  Atraumatic, Normocephalic  EYES: EOMI, PERRLA, conjunctiva and sclera clear  ENMT: No tonsillar erythema, exudates, or enlargement; Moist mucous membranes  NECK: Supple, No JVD,  CHEST/LUNG: Clear to ausculation bilaterally; No rales, rhonchi, wheezing, or rubs  HEART: Regular rate and rhythm; No murmurs, rubs, or gallops  ABDOMEN: Soft, Nontender, Nondistended; Bowel sounds present  EXTREMITIES:  2+ Peripheral Pulses, No clubbing, cyanosis, or edema  LYMPH: No lymphadenopathy noted  SKIN: No rashes or lesions  NERVOUS SYSTEM:  Alert & Oriented X1 to name
Mike Gorman

## 2020-11-08 NOTE — H&P ADULT - NSHPSOCIALHISTORY_GEN_ALL_CORE
Marital Status:  (   )    (   ) Single    (   )    ( X )   Occupation:  Retired  Lives with: (  ) alone  ( X ) children   (  ) spouse   (  ) parents  (  ) other    Substance Use (street drugs): (  X) never used  (  ) other:  Tobacco Usage:  (  X ) never smoked   (   ) former smoker   (   ) current smoker  (     ) pack year  (        ) last cigarette date  Alcohol Usage: Denies

## 2020-11-08 NOTE — H&P ADULT - HISTORY OF PRESENT ILLNESS
87-year-old female with HTN, advanced dementia, recent hospitalization at [facility] found to have L pubic ramus fracture, R sacral fracture, and R UPJ dilation, recent UTI, presenting with Nephrostomy tube inadvertently pulled by patient. As per family, negative ROS. Pt coming in from home hospice.

## 2020-11-08 NOTE — CONSULT NOTE ADULT - SUBJECTIVE AND OBJECTIVE BOX
History of Present Illness: 87yFemale with advanced dementia pulled out her indwelling right nephrostomy tube 3 days ago per report of health aide at bedside. She is referred to IR for reinsertion. PAtient denies flank pain or discomfort at this time.    Allergies:   No Known Allergies    Medications (Antibiotics/Cardiovascular/Anticoagulants/Blood Products):       Vital Signs:  T(F): 97.7 (11-08-20 @ 12:30), Max: 97.7 (11-08-20 @ 12:30)  HR: 75 (11-08-20 @ 12:30) (75 - 75)  BP: 128/67 (11-08-20 @ 12:30) (128/67 - 128/67)  RR: 18 (11-08-20 @ 12:30) (18 - 18)  SpO2: 90% (11-08-20 @ 12:30) (90% - 90%)    Limited Physical Exam: right flank prior PCN tract appears to be closed       Imaging: CT demonstrates right UPJ obstruction with advanced right renal cortical atrophy

## 2020-11-08 NOTE — H&P ADULT - NSHPREVIEWOFSYSTEMS_GEN_ALL_CORE
ROS obtained from Family as patient with advanced dementia  CONSTITUTIONAL: No weakness, fevers or chills  EYES: No visual changes; No blurry vision  ENT:  No pain or stiffness; No vertigo or throat pain  RESPIRATORY: No cough, wheezing, hemoptysis; No shortness of breath  CARDIOVASCULAR: No chest pain or palpitations  GASTROINTESTINAL: No abdominal or epigastric pain. No nausea, vomiting, or hematemesis; No diarrhea or constipation. No melena or hematochezia.  GENITOURINARY: No dysuria, frequency or hematuria  NEUROLOGICAL: No numbness, No HA  SKIN: No itching, rashes  MSK: no joint pain, no muscle pain

## 2020-11-08 NOTE — CONSULT NOTE ADULT - ASSESSMENT
Assessment: 87y Female self-pulled right nephrostomy tube.    Review of documentation from prior admission in August reveals that the tube was placed due to right flank pain suspected to be related to distention of the renal pelvis. Patient currently without flank pain/discomfort.    Plan:  -no patent tract through which to reinsert a nephrostomy tube without new puncture  -given that tube was initially placed as a palliative measure and patient is currently without symptoms would defer placement of a new nephrostomy tube at this time  -additionally benefit of a new nephrostomy tube is questionable as the right kidney likely produces little if any urine and severe hydronephrosis may not recur  -if patient redevelops right flank pain/discomfort would recommend reimaging and potential placement of new nephrostomy tube if there is significant hydronephrosis, which can be done on an outpatient basis  -Attempted to contact son Yoandy and daughter-in-law Lizzie multiple times to explain above without success. Plan discussed with Dr. Perry garcia who will explain to family when he is able to reach them    Miguel Buenrostro MD, RPVI  Chief Resident, Interventional Radiology  [facility]: [phone]  [facility]: [phone]

## 2020-11-08 NOTE — H&P ADULT - ASSESSMENT
87-year-old woman with HTN, advanced dementia, recent hospitalization at [place] found to have L pubic ramus fracture, R sacral fracture, and R UPJ dilation, recent UTI, presenting with Nephrostomy tube inadvertently pulled by patient pending IR vs Urology consult for adjustment.

## 2020-11-09 ENCOUNTER — NON-APPOINTMENT (OUTPATIENT)
Age: 85
End: 2020-11-09

## 2020-11-09 DIAGNOSIS — Z71.89 OTHER SPECIFIED COUNSELING: ICD-10-CM

## 2020-11-09 LAB
ANION GAP SERPL CALC-SCNC: 13 MMOL/L — SIGNIFICANT CHANGE UP (ref 5–17)
BUN SERPL-MCNC: 28 MG/DL — HIGH (ref 7–23)
CALCIUM SERPL-MCNC: 10.1 MG/DL — SIGNIFICANT CHANGE UP (ref 8.4–10.5)
CHLORIDE SERPL-SCNC: 100 MMOL/L — SIGNIFICANT CHANGE UP (ref 96–108)
CO2 SERPL-SCNC: 22 MMOL/L — SIGNIFICANT CHANGE UP (ref 22–31)
CREAT SERPL-MCNC: 1.19 MG/DL — SIGNIFICANT CHANGE UP (ref 0.5–1.3)
GLUCOSE SERPL-MCNC: 91 MG/DL — SIGNIFICANT CHANGE UP (ref 70–99)
HCT VFR BLD CALC: 39.5 % — SIGNIFICANT CHANGE UP (ref 34.5–45)
HGB BLD-MCNC: 12.8 G/DL — SIGNIFICANT CHANGE UP (ref 11.5–15.5)
INR BLD: 1.04 RATIO — SIGNIFICANT CHANGE UP (ref 0.88–1.16)
MCHC RBC-ENTMCNC: 30.9 PG — SIGNIFICANT CHANGE UP (ref 27–34)
MCHC RBC-ENTMCNC: 32.4 GM/DL — SIGNIFICANT CHANGE UP (ref 32–36)
MCV RBC AUTO: 95.4 FL — SIGNIFICANT CHANGE UP (ref 80–100)
NRBC # BLD: 0 /100 WBCS — SIGNIFICANT CHANGE UP (ref 0–0)
PLATELET # BLD AUTO: 277 K/UL — SIGNIFICANT CHANGE UP (ref 150–400)
POTASSIUM SERPL-MCNC: 4.4 MMOL/L — SIGNIFICANT CHANGE UP (ref 3.5–5.3)
POTASSIUM SERPL-SCNC: 4.4 MMOL/L — SIGNIFICANT CHANGE UP (ref 3.5–5.3)
PROTHROM AB SERPL-ACNC: 12.4 SEC — SIGNIFICANT CHANGE UP (ref 10.6–13.6)
RBC # BLD: 4.14 M/UL — SIGNIFICANT CHANGE UP (ref 3.8–5.2)
RBC # FLD: 13.8 % — SIGNIFICANT CHANGE UP (ref 10.3–14.5)
SODIUM SERPL-SCNC: 135 MMOL/L — SIGNIFICANT CHANGE UP (ref 135–145)
WBC # BLD: 12.1 K/UL — HIGH (ref 3.8–10.5)
WBC # FLD AUTO: 12.1 K/UL — HIGH (ref 3.8–10.5)

## 2020-11-09 PROCEDURE — 99233 SBSQ HOSP IP/OBS HIGH 50: CPT | Mod: GC

## 2020-11-09 PROCEDURE — 99232 SBSQ HOSP IP/OBS MODERATE 35: CPT

## 2020-11-09 PROCEDURE — 76775 US EXAM ABDO BACK WALL LIM: CPT | Mod: 26

## 2020-11-09 PROCEDURE — 76770 US EXAM ABDO BACK WALL COMP: CPT | Mod: 26

## 2020-11-09 RX ADMIN — DONEPEZIL HYDROCHLORIDE 10 MILLIGRAM(S): 10 TABLET, FILM COATED ORAL at 21:59

## 2020-11-09 RX ADMIN — ESCITALOPRAM OXALATE 5 MILLIGRAM(S): 10 TABLET, FILM COATED ORAL at 11:45

## 2020-11-09 NOTE — PROGRESS NOTE ADULT - PROBLEM SELECTOR PLAN 3
-Pt DNR/DNI, from home hospice, HCN. DASIA in chart  -Pending urology consult  for possible Nephrostomy tube -Pt DNR/DNI, from home hospice, HCN. DASIA in chart  -Pending urology consult  for possible Nephrostomy tube or stent DNR/DNI   patient on home hospice

## 2020-11-09 NOTE — PROGRESS NOTE ADULT - PROBLEM SELECTOR PLAN 1
Patient is a fall risk  -Follow safety protocol ( bed alarm activated, hourly rounds, call bell within reach)  - Reorient patient as needed  --c/w Aricept  -Family members at the bedside

## 2020-11-09 NOTE — PRE PROCEDURE NOTE - PRE PROCEDURE EVALUATION
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     Procedure: Right PCN    Diagnosis/Indication: Patient is a 87y old  Female who presents with a chief complaint of Displacement of nephrostomy catheter (09 Nov 2020 11:13)    PAST MEDICAL & SURGICAL HISTORY:  Dementia    HTN (hypertension)    History of hip surgery    Female    Allergies: No Known Allergies      LABS:  CBC Full  -  ( 09 Nov 2020 16:47 )  WBC Count : 12.10 K/uL  RBC Count : 4.14 M/uL  Hemoglobin : 12.8 g/dL  Hematocrit : 39.5 %  Platelet Count - Automated : 277 K/uL    Plan:  -Right PCN placement with anesthesia.  -Procedure/ risks/ benefits were explained, informed consent obtained from patient's son (Yoandy Kolb), verbalizes understanding. 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     Procedure: Right PCN    Diagnosis/Indication: Patient is a 87y old  Female who presents with a chief complaint of Displacement of nephrostomy catheter (09 Nov 2020 11:13)    PAST MEDICAL & SURGICAL HISTORY:  Dementia    HTN (hypertension)    History of hip surgery    Female    Allergies: No Known Allergies      LABS:  CBC Full  -  ( 09 Nov 2020 16:47 )  WBC Count : 12.10 K/uL  RBC Count : 4.14 M/uL  Hemoglobin : 12.8 g/dL  Hematocrit : 39.5 %  Platelet Count - Automated : 277 K/uL    Plan:  -Right PCN placement with anesthesia.  -Procedure/ risks/ benefits were explained, informed consent obtained from patient's son (Yoandy Kolb), verbalizes understanding.   -DNR to be suspended for the duration of the procedure. 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     Procedure: Right PCN    Diagnosis/Indication: Patient is a 87y old  Female who presents with a chief complaint of Displacement of nephrostomy catheter (09 Nov 2020 11:13)    PAST MEDICAL & SURGICAL HISTORY:  Dementia    HTN (hypertension)    History of hip surgery    Female    Allergies: No Known Allergies      LABS:  CBC Full  -  ( 09 Nov 2020 16:47 )  WBC Count : 12.10 K/uL  RBC Count : 4.14 M/uL  Hemoglobin : 12.8 g/dL  Hematocrit : 39.5 %  Platelet Count - Automated : 277 K/uL    Plan:  -Right PCN placement with anesthesia.  -NPO after midnight.  -CBC/BMP/Coags in AM.  -Procedure/ risks/ benefits were explained, informed consent obtained from patient's son (Yoandy Kolb), verbalizes understanding.   -DNR to be suspended for the duration of the procedure.

## 2020-11-09 NOTE — PROGRESS NOTE ADULT - PROBLEM SELECTOR PLAN 2
-Pending urology consult -Pending urology consult, discussed with Dr. Hoenig  -will order AM labs  -?renal US, possible uretero  -has oxycodone PRN for breakthrough pain, currently asymptomatic

## 2020-11-09 NOTE — PRE PROCEDURE NOTE - ATTENDING COMMENTS
AS above, pt known to me from prior interventions. US does not show hydronephrosis to the same degree as prior PCN insertion.  AS outlined in prior notes we will attempt PCN and try to internalize if feasible to NU.  COnsent secured.

## 2020-11-09 NOTE — PROGRESS NOTE ADULT - SUBJECTIVE AND OBJECTIVE BOX
GAP TEAM PALLIATIVE CARE UNIT PROGRESS NOTE:      [  ] Patient on hospice program.    INDICATION FOR PALLIATIVE CARE UNIT SERVICES: Displacement of nephrostomy catheter. Pending IR vs Urology consult for adjustment.    INTERVAL HPI/OVERNIGHT EVENTS: Patient is A&OX2(self and ). Patient denies any pain or discomfort. Patient did not require any PRN medications overnight. R flank site wnl, no S&S of infection. Patient is on a regular diet and is eating about 50% of her meals. Patient with positive BS, last BM on 2020.    DNR on chart: Yes       Allergies    No Known Allergies    Intolerances    MEDICATIONS  (STANDING):  donepezil 10 milliGRAM(s) Oral at bedtime  escitalopram 5 milliGRAM(s) Oral daily  influenza   Vaccine 0.5 milliLiter(s) IntraMuscular once    MEDICATIONS  (PRN):  acetaminophen   Tablet .. 650 milliGRAM(s) Oral every 6 hours PRN Temp greater or equal to 38C (100.4F)  oxyCODONE    IR 5 milliGRAM(s) Oral every 4 hours PRN Severe Pain (7 - 10)    ITEMS UNCHECKED ARE NOT PRESENT    PRESENT SYMPTOMS: [ ]Unable to obtain due to poor mentation   Source if other than patient:  [ ]Family   [ ]Team     Pain: [ ] yes [X ] no  QOL impact -   Location -                    Aggravating factors -  Quality -  Radiation -  Timing-  Severity (0-10 scale):  Minimal acceptable level (0-10 scale):     Dyspnea:                           [ ]Mild [ ]Moderate [ ]Severe  Anxiety:                             [ ]Mild [ ]Moderate [ ]Severe  Fatigue:                             [ ]Mild [ ]Moderate [ ]Severe  Nausea:                             [ ]Mild [ ]Moderate [ ]Severe  Loss of appetite:              [ ]Mild [ ]Moderate [ ]Severe  Constipation:                    [ ]Mild [ ]Moderate [ ]Severe    PAINAD Score:    http://geriatrictoolkit.[place].[facility]/cog/painad.pdf (Ctrl +  left click to view)  		  Other Symptoms:  [ ]All other review of systems negative     Palliative Performance Status Version 2:       50  %         http://npcrc.org/files/news/palliative_performance_scale_ppsv2.pdf  PHYSICAL EXAM:  Vital Signs Last 24 Hrs  T(C): 36.5 (2020 08:23), Max: 36.5 (2020 12:30)  T(F): 97.7 (2020 08:23), Max: 97.7 (2020 12:30)  HR: 74 (2020 08:23) (74 - 75)  BP: 114/67 (2020 08:23) (114/67 - 128/67)  BP(mean): --  RR: 18 (2020 08:23) (18 - 18)  SpO2: 95% (2020 08:23) (90% - 95%) I&O's Summary  GENERAL:  [ X]Alert  [ ]Oriented x2   [ ]Lethargic  [ ]Cachexia  [ ]Unarousable  [ ]Verbal  [ ]Non-Verbal  Behavioral:   [ ] Anxiety  [ ] Delirium [ ] Agitation [ X] Other calm  HEENT:  [X ]Normal   [ ]Dry mouth   [ ]ET Tube/Trach  [ ]Oral lesions  PULMONARY:   [ X]Clear [ ]Tachypnea  [ ]Audible excessive secretions   [ ]Rhonchi        [ ]Right [ ]Left [ ]Bilateral  [ ]Crackles        [ ]Right [ ]Left [ ]Bilateral  [ ]Wheezing     [ ]Right [ ]Left [ ]Bilateral  [ ]Diminshed BS [ ]Right [ ]Left [ ]Bilateral    CARDIOVASCULAR:    [X ]Regular [ ]Irregular [ ]Tachy  [ ]Justyn [ ]Murmur [ ]Other  GASTROINTESTINAL:  [X ]Soft  [ ]Distended   [ X]+BS  [ ]Non tender [ ]Tender  [ ]PEG [ ]OGT/ NGT   Last BM:   2020  GENITOURINARY:  [ X]Normal [ ] Incontinent   [ ]Oliguria/Anuria   [ ]Roth  MUSCULOSKELETAL:   [ ]Normal   [ X]Weakness  [ ]Bed/Wheelchair bound [ ]Edema  NEUROLOGIC:   [ ]No focal deficits  [X ] Cognitive impairment  [ ] Dysphagia [ ]Dysarthria [ ] Paresis [ ]Other   SKIN:   [X ]Normal  [ ]Rash     [ ]Pressure ulcer(s)  [ ]y [ ]Xn  Present on admission      CRITICAL CARE:  [ ] Shock Present  [ ]Septic [ ]Cardiogenic [ ]Neurologic [ ]Hypovolemic  [ ]  Vasopressors [ ]  Inotropes   [ ] Respiratory failure present [ ] Mechanical Ventilation [ ] Non-invasive ventilatory support [ ] High-Flow  [ ] Acute  [ ] Chronic [ ] Hypoxic  [ ] Hypercarbic [ ] Other  [ ] Other organ failure     LABS:            RADIOLOGY & ADDITIONAL STUDIES:    PROTEIN CALORIE MALNUTRITION: [ ] mild [ ] moderate [ ] severe  [ ] underweight [ ] morbid obesity    https://www.andeal.org/vault/2440/web/files/ONC/Table_Clinical%20Characteristics%20to%20Document%20Malnutrition-White%20JV%20et%20al%2020.pdf    Height (cm): 157.5 (20 @ 11:35)  Weight (kg): 46.266 (20 @ 11:35)  BMI (kg/m2): 18.7 (20 @ 11:35)    [ ] PPSV2 < or = 30% [ ] significant weight loss [ ] poor nutritional intake [ ] anasarca   Artificial Nutrition [ ]     REFERRALS:   [ ]Chaplaincy  [ ] Hospice  [ ]Child Life  [ ]Social Work  [ ]Case management [ ]Holistic Therapy [ ] Physical Therapy [ ] Dietary   Goals of Care Document: KILEY Vivas (20 @ 15:35)  Goals of Care Conversation:   Conversation Discussion:  · Conversation  Hospice Referral  · Conversation Details  HCN RN spoke with dotty Pitt via telephone, explained hospice home services vs in-pt services with return understanding. HCN consents emailed to son as requested. Admission to hospice is pending signed consents.      Electronic Signatures:  Sarahy Vivas (GUILLAUME)  (Signed 24-Aug-2020 15:37)  	Authored: Goals of Care Conversation      Last Updated: 24-Aug-2020 15:37 by Sarahy Vivas (RN)       GAP TEAM PALLIATIVE CARE UNIT PROGRESS NOTE:      [  ] Patient on hospice program.    INDICATION FOR PALLIATIVE CARE UNIT SERVICES: Displacement of nephrostomy catheter. Pending IR vs Urology consult for adjustment.    INTERVAL HPI/OVERNIGHT EVENTS: Patient is A&OX2(self and ). Patient denies any pain or discomfort. Patient did not require any PRN medications overnight. R flank site wnl, no S&S of infection. Patient is on a regular diet and is eating about 50% of her meals. Patient with positive BS, last BM on 2020.    DNR on chart: Yes       Allergies    No Known Allergies    Intolerances    MEDICATIONS  (STANDING):  donepezil 10 milliGRAM(s) Oral at bedtime  escitalopram 5 milliGRAM(s) Oral daily  influenza   Vaccine 0.5 milliLiter(s) IntraMuscular once    MEDICATIONS  (PRN):  acetaminophen   Tablet .. 650 milliGRAM(s) Oral every 6 hours PRN Temp greater or equal to 38C (100.4F)  oxyCODONE    IR 5 milliGRAM(s) Oral every 4 hours PRN Severe Pain (7 - 10)    ITEMS UNCHECKED ARE NOT PRESENT    PRESENT SYMPTOMS: [ ]Unable to obtain due to poor mentation   Source if other than patient:  [ ]Family   [ ]Team     Pain: [ ] yes [X ] no  QOL impact -   Location -                    Aggravating factors -  Quality -  Radiation -  Timing-  Severity (0-10 scale):  Minimal acceptable level (0-10 scale):     Dyspnea:                           [ ]Mild [ ]Moderate [ ]Severe  Anxiety:                             [ ]Mild [ ]Moderate [ ]Severe  Fatigue:                             [ ]Mild [ ]Moderate [ ]Severe  Nausea:                             [ ]Mild [ ]Moderate [ ]Severe  Loss of appetite:              [ ]Mild [ ]Moderate [ ]Severe  Constipation:                    [ ]Mild [ ]Moderate [ ]Severe    PAINAD Score:    http://geriatrictoolkit.[place].[facility]/cog/painad.pdf (Ctrl +  left click to view)  		  Other Symptoms:  [x ]All other review of systems negative     Palliative Performance Status Version 2:       50  %         http://npcrc.org/files/news/palliative_performance_scale_ppsv2.pdf  PHYSICAL EXAM:  Vital Signs Last 24 Hrs  T(C): 36.5 (2020 08:23), Max: 36.5 (2020 12:30)  T(F): 97.7 (2020 08:23), Max: 97.7 (2020 12:30)  HR: 74 (2020 08:23) (74 - 75)  BP: 114/67 (2020 08:23) (114/67 - 128/67)  BP(mean): --  RR: 18 (2020 08:23) (18 - 18)  SpO2: 95% (2020 08:23) (90% - 95%) I&O's Summary  GENERAL:  [ X]Alert  [ ]Oriented x2   [ ]Lethargic  [ ]Cachexia  [ ]Unarousable  [ ]Verbal  [ ]Non-Verbal  Behavioral:   [ ] Anxiety  [ ] Delirium [ ] Agitation [ X] Other calm  HEENT:  [X ]Normal   [ ]Dry mouth   [ ]ET Tube/Trach  [ ]Oral lesions  PULMONARY:   [ X]Clear [ ]Tachypnea  [ ]Audible excessive secretions   [ ]Rhonchi        [ ]Right [ ]Left [ ]Bilateral  [ ]Crackles        [ ]Right [ ]Left [ ]Bilateral  [ ]Wheezing     [ ]Right [ ]Left [ ]Bilateral  [ ]Diminshed BS [ ]Right [ ]Left [ ]Bilateral    CARDIOVASCULAR:    [X ]Regular [ ]Irregular [ ]Tachy  [ ]Justyn [ ]Murmur [ ]Other  GASTROINTESTINAL:  [X ]Soft  [ ]Distended   [ X]+BS  [ ]Non tender [ ]Tender  [ ]PEG [ ]OGT/ NGT   Last BM:   2020  GENITOURINARY:  [ X]Normal [ ] Incontinent   [ ]Oliguria/Anuria   [ ]Roth  MUSCULOSKELETAL:   [ ]Normal   [ X]Weakness  [ ]Bed/Wheelchair bound [ ]Edema  NEUROLOGIC:   [ ]No focal deficits  [X ] Cognitive impairment  [ ] Dysphagia [ ]Dysarthria [ ] Paresis [ ]Other   SKIN:   [X ]Normal  [ ]Rash     [ ]Pressure ulcer(s)  [ ]y [ ]Xn  Present on admission      CRITICAL CARE:  [ ] Shock Present  [ ]Septic [ ]Cardiogenic [ ]Neurologic [ ]Hypovolemic  [ ]  Vasopressors [ ]  Inotropes   [ ] Respiratory failure present [ ] Mechanical Ventilation [ ] Non-invasive ventilatory support [ ] High-Flow  [ ] Acute  [ ] Chronic [ ] Hypoxic  [ ] Hypercarbic [ ] Other  [ ] Other organ failure     LABS: no new labs-> will order for AM in anticipation of procedure      RADIOLOGY & ADDITIONAL STUDIES: no new imaging studies, ?renal US    PROTEIN CALORIE MALNUTRITION: [ ] mild [ ] moderate [ ] severe  [ ] underweight [ ] morbid obesity    https://www.andeal.org/vault/2440/web/files/ONC/Table_Clinical%20Characteristics%20to%20Document%20Malnutrition-White%20JV%20et%20al%2020.pdf    Height (cm): 157.5 (20 @ 11:35)  Weight (kg): 46.266 (20 @ 11:35)  BMI (kg/m2): 18.7 (20 @ 11:35)    [ ] PPSV2 < or = 30% [ ] significant weight loss [ ] poor nutritional intake [ ] anasarca   Artificial Nutrition [ ]     REFERRALS:   [ ]Chaplaincy  [x ] Hospice  [ ]Child Life  [x ]Social Work  [ ]Case management [ ]Holistic Therapy [ ] Physical Therapy [ ] Dietary   Goals of Care Document: KILEY Vivas (20 @ 15:35)  Goals of Care Conversation:   Conversation Discussion:  · Conversation  Hospice Referral  · Conversation Details  HCN RN spoke with dotty Pitt via telephone, explained hospice home services vs in-pt services with return understanding. HCN consents emailed to son as requested. Admission to hospice is pending signed consents.      Electronic Signatures:  Sarahy Vivsa (GUILLAUME)  (Signed 24-Aug-2020 15:37)  	Authored: Goals of Care Conversation      Last Updated: 24-Aug-2020 15:37 by Sarahy Vivas (GUILLAUME)

## 2020-11-10 LAB
BLD GP AB SCN SERPL QL: NEGATIVE — SIGNIFICANT CHANGE UP
HCT VFR BLD CALC: 38.8 % — SIGNIFICANT CHANGE UP (ref 34.5–45)
HGB BLD-MCNC: 12.3 G/DL — SIGNIFICANT CHANGE UP (ref 11.5–15.5)
MCHC RBC-ENTMCNC: 30.5 PG — SIGNIFICANT CHANGE UP (ref 27–34)
MCHC RBC-ENTMCNC: 31.7 GM/DL — LOW (ref 32–36)
MCV RBC AUTO: 96.3 FL — SIGNIFICANT CHANGE UP (ref 80–100)
NRBC # BLD: 0 /100 WBCS — SIGNIFICANT CHANGE UP (ref 0–0)
PLATELET # BLD AUTO: 144 K/UL — LOW (ref 150–400)
RBC # BLD: 4.03 M/UL — SIGNIFICANT CHANGE UP (ref 3.8–5.2)
RBC # FLD: 14 % — SIGNIFICANT CHANGE UP (ref 10.3–14.5)
RH IG SCN BLD-IMP: POSITIVE — SIGNIFICANT CHANGE UP
WBC # BLD: 8.55 K/UL — SIGNIFICANT CHANGE UP (ref 3.8–10.5)
WBC # FLD AUTO: 8.55 K/UL — SIGNIFICANT CHANGE UP (ref 3.8–10.5)

## 2020-11-10 PROCEDURE — 50432 PLMT NEPHROSTOMY CATHETER: CPT | Mod: RT

## 2020-11-10 PROCEDURE — 99221 1ST HOSP IP/OBS SF/LOW 40: CPT

## 2020-11-10 PROCEDURE — 99233 SBSQ HOSP IP/OBS HIGH 50: CPT | Mod: GC

## 2020-11-10 RX ORDER — CEFTRIAXONE 500 MG/1
1000 INJECTION, POWDER, FOR SOLUTION INTRAMUSCULAR; INTRAVENOUS EVERY 24 HOURS
Refills: 0 | Status: DISCONTINUED | OUTPATIENT
Start: 2020-11-10 | End: 2020-11-12

## 2020-11-10 RX ORDER — ONDANSETRON 8 MG/1
4 TABLET, FILM COATED ORAL ONCE
Refills: 0 | Status: DISCONTINUED | OUTPATIENT
Start: 2020-11-10 | End: 2020-11-12

## 2020-11-10 RX ORDER — SODIUM CHLORIDE 9 MG/ML
1000 INJECTION, SOLUTION INTRAVENOUS
Refills: 0 | Status: DISCONTINUED | OUTPATIENT
Start: 2020-11-10 | End: 2020-11-10

## 2020-11-10 RX ORDER — FENTANYL CITRATE 50 UG/ML
25 INJECTION INTRAVENOUS
Refills: 0 | Status: DISCONTINUED | OUTPATIENT
Start: 2020-11-10 | End: 2020-11-10

## 2020-11-10 RX ADMIN — ESCITALOPRAM OXALATE 5 MILLIGRAM(S): 10 TABLET, FILM COATED ORAL at 12:52

## 2020-11-10 RX ADMIN — DONEPEZIL HYDROCHLORIDE 10 MILLIGRAM(S): 10 TABLET, FILM COATED ORAL at 22:19

## 2020-11-10 RX ADMIN — CEFTRIAXONE 100 MILLIGRAM(S): 500 INJECTION, POWDER, FOR SOLUTION INTRAMUSCULAR; INTRAVENOUS at 15:24

## 2020-11-10 NOTE — CONSULT NOTE ADULT - PROVIDER SPECIALTY LIST ADULT
Blepharitis  Blepharitis is redness, soreness, and swelling (inflammation) of one or both eyelids. It may be caused by an allergic reaction or a bacterial infection. Blepharitis may also be associated with reddened, scaly skin (seborrhea) of the scalp and eyebrows. While you sleep, eye discharge may cause your eyelashes to stick together. Your eyelids may itch, burn, swell, and may lose their lashes. These will grow back. Your eyes may become sensitive. Blepharitis may recur and need repeated treatment. If this is the case, you may require further evaluation by an eye specialist (ophthalmologist).  HOME CARE INSTRUCTIONS   · Keep your hands clean.  · Use a clean towel each time you dry your eyelids. Do not use this towel to clean other areas. Do not share a towel or makeup with anyone.  · Wash your eyelids with warm water or warm water mixed with a small amount of baby shampoo. Do this twice a day or as often as needed.  · Wash your face and eyebrows at least once a day.  · Use warm compresses 2 times a day for 10 minutes at a time, or as directed by your caregiver.  · Apply antibiotic ointment as directed by your caregiver.  · Avoid rubbing your eyes.  · Avoid wearing makeup until you get better.  · Follow up with your caregiver as directed.  SEEK IMMEDIATE MEDICAL CARE IF:   · You have pain, redness, or swelling that gets worse or spreads to other parts of your face.  · Your vision changes, or you have pain when looking at lights or moving objects.  · You have a fever.  · Your symptoms continue for longer than 2 to 4 days or become worse.  MAKE SURE YOU:   · Understand these instructions.  · Will watch your condition.  · Will get help right away if you are not doing well or get worse.     This information is not intended to replace advice given to you by your health care provider. Make sure you discuss any questions you have with your health care provider.     Document Released: 12/15/2001 Document Revised:  03/11/2013 Document Reviewed: 04/11/2016  [employer] Interactive Patient Education ©2016 Elsevier Inc.    Blefaritis  (Blepharitis)   La blefaritis es la inflamación, dolor e hinchazón (inflamación) de los párpados. La causa puede ser jeffrey reacción alérgica o jeffrey infección por un bacteriana. Puede estar asociada a la seborrea del cuero cabelludo y de las wilfred. Mientras duerme, la lesión puede drenar y hacer que las pestañas se peguen. Los párpados pueden picar, arder, hincharse y perder las pestañas. Luego volverán a crecer. Los ojos pueden estar sensibles. La blefaritis puede recurrir y será necesario repetir el tratamiento. En sandee mark, podrá requerir evaluaciones más completas por parte de un oculista (oftalmólogo).  INSTRUCCIONES PARA EL CUIDADO DOMICILIO   · Mantenga analilia ivan limpias.  · Use jeffrey toalla limpia cada vez que se seque los párpados. No utilice esta toalla para limpiar otras zonas. No comparta toallas ni elementos de maquillaje con otras personas.  · Lave analilia párpados con agua tibia o con agua tibia mezclada con jeffrey pequeña cantidad de champú para bebés. Hágalo dos veces por día o según las necesidades.  · Lave buenrostro irene y las wilfred al menos dos veces por día.  · Use compresas tibias dos veces por día evelin 10 minutos cada vez, o según las indicaciones.  · aplique los ungüentos con antibióticos rich le indicó el médico.  · Evite frotarse los ojos.  · Evite usar maquillaje hasta que se sienta mejor.  · Concurra a las visitas de control médico, según le haya indicado.  SOLICITE ATENCIÓN MÉDICA DE INMEDIATO SI:  · Tiene dolor, enrojecimiento o hinchazón que empeora o se extiende a otras partes del irene.  · Tiene cambios en la visión o siente dolor al mirar luces u objetos que se mueven.  · Tiene fiebre.  · Los síntomas persisten evelin más de 2-4 días o empeoran.  ESTÉ SEGURO QUE:   · Comprende las instrucciones para el miller médica.  · Controlará buenrostro enfermedad.  · Solicitará atención médica de  inmediato según las indicaciones.     Esta información no tiene rich fin reemplazar el consejo del médico. Asegúrese de hacerle al médico cualquier pregunta que tenga.     Document Released: 12/18/2006 Document Revised: 03/11/2013  Elsevier Interactive Patient Education ©2016 Elsevier Inc.       Urology

## 2020-11-10 NOTE — ASSESSMENT
[FreeTextEntry1] : Home visit following Hospice nurse who said VSS. Pt in no distress. Catheter in place. Pt stable. No chagne hospice nurse to care for drain - neprhostomy.  Pt to foolow up with Dr. Hoenig if pt stabiliezes.  Await pt course on hospice, may stabilize or may not. Family goals are to focus on pt comfort.PT DMR/DNI.

## 2020-11-10 NOTE — PROGRESS NOTE ADULT - PROBLEM SELECTOR PLAN 1
Patient is a fall risk  -Follow safety protocol ( bed alarm activated, hourly rounds, call bell within reach)  - Reorient patient as needed  --c/w Aricept  -Family members at the bedside.

## 2020-11-10 NOTE — HISTORY OF PRESENT ILLNESS
[FreeTextEntry1] : 88 yo discharged on Monday Aug 24 late from PCU at [facility]. Pt had an abcess around right kidney drained (1.4 L) of pus.Pt discharged home with hospice. Hospice nurse saw pt earlier in day.  Pt without fever, vss.  Pt comfortable. NO sob. pt weak,not interested in getting out of bed.\par \par Pt confused, pleasant, no distress

## 2020-11-10 NOTE — PROGRESS NOTE ADULT - SUBJECTIVE AND OBJECTIVE BOX
Interventional Radiology Brief- Operative Note    Procedure: Right PCN insertion    Operators: Dr. Olmstead and Dr. Duron    Anesthesia (type): IV Sedation    Contrast: 5 cc Omni 240    EBL: Minimal    Findings/Follow up Plan of Care: Limited US of the right flank showed an atrophic kidney with mild hydronephrosis. Successful insertion of an 8Fr PCN with its tip in the pelvis.     Specimens Removed: 3 cc of purulent urine submitted for culture    Implants: 8Fr right nephrostomy drain    Complications: None    Condition/Disposition: Stable/Recovery    Please call Interventional Radiology [phone] with any questions, concerns, or issues. Interventional Radiology Brief- Operative Note    Procedure: Right PCN insertion    Operators: Dr. Olmstead and Dr. Duron    Anesthesia (type): IV Sedation    Contrast: 5 cc Omni 240    EBL: Minimal    Findings/Follow up Plan of Care: Limited US of the right flank showed an atrophic kidney with mild hydronephrosis. Successful insertion of an 8Fr PCN with its tip in the pelvis.  purulent material obtained and sent for culture.    Specimens Removed: 3 cc of purulent urine submitted for culture    Implants: 8Fr right nephrostomy drain    Complications: None    Condition/Disposition: Stable/Recovery.  IF internalization is desirable can attempt at another session if no ID issues.    Please call Interventional Radiology x [phone] with any questions, concerns, or issues.

## 2020-11-10 NOTE — CONSULT NOTE ADULT - SUBJECTIVE AND OBJECTIVE BOX
HPI: 88yo female with PMHx of HTN, advanced dementia, recent hospitalization at [place] found to have L pubic ramus fracture, R sacral fracture, and R UPJ dilation, recent UTI, presenting with Nephrostomy tube inadvertently pulled by patient. As per family, negative ROS. Pt coming in from home hospice. Pt now s/p replacement of R PCN with IR today 11/10/2020. Per IR, 3cc of purulent urine aspirated after placement of tube. Urology team consulted for evaluation of pt. Pt follows up with [facility] Urology provider Dr. Hoenig. 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 Per family, would like to continue to exchange NU over wire in office rather than pursue more invasive treatment. Pt seen and examined after IR procedure. Family at the bedside. Per pt, feels well and has mild discomfort in the right flank. Home Aide states very little urine comes out of R NT at home and she empties the bag every 3-4 days. Per family denies fever/chills at home, N/V, abd pain, hematuria, dysuria or other acute complaints at this time.     PAST MEDICAL & SURGICAL HISTORY:  Dementia    HTN (hypertension)    History of hip surgery        MEDICATIONS  (STANDING):  cefTRIAXone   IVPB 1000 milliGRAM(s) IV Intermittent every 24 hours  donepezil 10 milliGRAM(s) Oral at bedtime  escitalopram 5 milliGRAM(s) Oral daily  influenza   Vaccine 0.5 milliLiter(s) IntraMuscular once  lactated ringers. 1000 milliLiter(s) (75 mL/Hr) IV Continuous <Continuous>    MEDICATIONS  (PRN):  acetaminophen   Tablet .. 650 milliGRAM(s) Oral every 6 hours PRN Temp greater or equal to 38C (100.4F)  LORazepam   Injectable 0.5 milliGRAM(s) IV Push every 1 hour PRN Agitation  ondansetron Injectable 4 milliGRAM(s) IV Push once PRN Nausea and/or Vomiting  oxyCODONE    IR 5 milliGRAM(s) Oral every 4 hours PRN Severe Pain (7 - 10)      FAMILY HISTORY:  FH: dementia        Allergies    No Known Allergies    Intolerances        SOCIAL HISTORY:    REVIEW OF SYSTEMS: Otherwise negative as stated in HPI    Physical Exam  Vital signs  T(C): 36.6 (11-10-20 @ 09:47), Max: 36.7 (11-10-20 @ 07:20)  HR: 88 (11-10-20 @ 11:13)  BP: 132/70 (11-10-20 @ 11:13)  SpO2: 92% (11-10-20 @ 11:13)    Output    Gen: No Acute Distress. Pleasant affect.  Pulm: No respiratory distress  Abd: Soft, nontender, nondistended.  Back: R NT in place with dressing C/D/I. Minimal clear pink tinged urine in the NT bag. No palpable hematoma or ecchymosis.   : No suprapubic tenderness. Voiding      LABS:                        12.3   8.55  )-----------( 144      ( 10 Nov 2020 07:14 )             38.8     11-09    135  |  100  |  28<H>  ----------------------------<  91  4.4   |  22  |  1.19    Ca    10.1      09 Nov 2020 16:47      PT/INR - ( 09 Nov 2020 16:47 )   PT: 12.4 sec;   INR: 1.04 ratio        Urine Cx: pending    RADIOLOGY: < from: US Kidney and Bladder (11.09.20 @ 16:37) >  IMPRESSION: Minimal/mild dilatation of the right renal collecting system. No gross right sided hydronephrosis. Atrophic right kidney. Nephrostomy tube not visualized.    Normal-appearing left kidney.    < end of copied text >       HPI: 86yo female with PMHx of HTN, advanced dementia, recent hospitalization at [place] found to have L pubic ramus fracture, R sacral fracture, and R UPJ dilation, recent UTI, presenting with Nephrostomy tube inadvertently pulled by patient. As per family, negative ROS. Pt coming in from home hospice. Pt now s/p replacement of R PCN with IR today 11/10/2020. Per IR, 3cc of purulent urine aspirated after placement of tube. Urology team consulted for evaluation of pt. Pt follows up with [facility] Urology provider Dr. Hoenig. 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 Per family, would like to continue to exchange NU over wire in office rather than pursue more invasive treatment. Pt seen and examined after IR procedure. Family at the bedside. Per pt, feels well and has mild discomfort in the right flank. Home Aide states very little urine comes out of R NT at home and she empties the bag every 3-4 days. Per family denies fever/chills at home, N/V, abd pain, hematuria, dysuria or other acute complaints at this time. Patient seen by Dr. Hoenig yesterday.    PAST MEDICAL & SURGICAL HISTORY:  Dementia    HTN (hypertension)    History of hip surgery        MEDICATIONS  (STANDING):  cefTRIAXone   IVPB 1000 milliGRAM(s) IV Intermittent every 24 hours  donepezil 10 milliGRAM(s) Oral at bedtime  escitalopram 5 milliGRAM(s) Oral daily  influenza   Vaccine 0.5 milliLiter(s) IntraMuscular once  lactated ringers. 1000 milliLiter(s) (75 mL/Hr) IV Continuous <Continuous>    MEDICATIONS  (PRN):  acetaminophen   Tablet .. 650 milliGRAM(s) Oral every 6 hours PRN Temp greater or equal to 38C (100.4F)  LORazepam   Injectable 0.5 milliGRAM(s) IV Push every 1 hour PRN Agitation  ondansetron Injectable 4 milliGRAM(s) IV Push once PRN Nausea and/or Vomiting  oxyCODONE    IR 5 milliGRAM(s) Oral every 4 hours PRN Severe Pain (7 - 10)      FAMILY HISTORY:  FH: dementia        Allergies    No Known Allergies    Intolerances        SOCIAL HISTORY:    REVIEW OF SYSTEMS: Otherwise negative as stated in HPI    Physical Exam  Vital signs  T(C): 36.6 (11-10-20 @ 09:47), Max: 36.7 (11-10-20 @ 07:20)  HR: 88 (11-10-20 @ 11:13)  BP: 132/70 (11-10-20 @ 11:13)  SpO2: 92% (11-10-20 @ 11:13)    Output    Gen: No Acute Distress. Pleasant affect.  Pulm: No respiratory distress  Abd: Soft, nontender, nondistended.  Back: R NT in place with dressing C/D/I. Minimal clear pink tinged urine in the NT bag. No palpable hematoma or ecchymosis.   : No suprapubic tenderness. Voiding      LABS:                        12.3   8.55  )-----------( 144      ( 10 Nov 2020 07:14 )             38.8     11-09    135  |  100  |  28<H>  ----------------------------<  91  4.4   |  22  |  1.19    Ca    10.1      09 Nov 2020 16:47      PT/INR - ( 09 Nov 2020 16:47 )   PT: 12.4 sec;   INR: 1.04 ratio        Urine Cx: pending    RADIOLOGY: < from: US Kidney and Bladder (11.09.20 @ 16:37) >  IMPRESSION: Minimal/mild dilatation of the right renal collecting system. No gross right sided hydronephrosis. Atrophic right kidney. Nephrostomy tube not visualized.    Normal-appearing left kidney.    < end of copied text >       HPI: 86yo female with PMHx of HTN, advanced dementia, recent hospitalization at [place] found to have L pubic ramus fracture, R sacral fracture, and R UPJ dilation, recent UTI, presenting with Nephrostomy tube inadvertently pulled by patient. As per family, negative ROS. Pt coming in from home hospice. Pt now s/p replacement of R PCN with IR today 11/10/2020. Per IR, 3cc of purulent urine aspirated after placement of tube. Urology team consulted for evaluation of pt. Pt follows up with [facility] Urology provider Dr. Hoenig. 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 Per family, would like to continue to exchange NU over wire in office rather than pursue more invasive treatment. Pt seen and examined after IR procedure. Family at the bedside. Per pt, feels well and has mild discomfort in the right flank. Home Aide states very little urine comes out of R NT at home and she empties the bag every 3-4 days. Per family denies fever/chills at home, N/V, abd pain, hematuria, dysuria or other acute complaints at this time. Patient seen by Dr. Hoenig as well.    PAST MEDICAL & SURGICAL HISTORY:  Dementia    HTN (hypertension)    History of hip surgery        MEDICATIONS  (STANDING):  cefTRIAXone   IVPB 1000 milliGRAM(s) IV Intermittent every 24 hours  donepezil 10 milliGRAM(s) Oral at bedtime  escitalopram 5 milliGRAM(s) Oral daily  influenza   Vaccine 0.5 milliLiter(s) IntraMuscular once  lactated ringers. 1000 milliLiter(s) (75 mL/Hr) IV Continuous <Continuous>    MEDICATIONS  (PRN):  acetaminophen   Tablet .. 650 milliGRAM(s) Oral every 6 hours PRN Temp greater or equal to 38C (100.4F)  LORazepam   Injectable 0.5 milliGRAM(s) IV Push every 1 hour PRN Agitation  ondansetron Injectable 4 milliGRAM(s) IV Push once PRN Nausea and/or Vomiting  oxyCODONE    IR 5 milliGRAM(s) Oral every 4 hours PRN Severe Pain (7 - 10)      FAMILY HISTORY:  FH: dementia        Allergies    No Known Allergies    Intolerances        SOCIAL HISTORY:    REVIEW OF SYSTEMS: Otherwise negative as stated in HPI    Physical Exam  Vital signs  T(C): 36.6 (11-10-20 @ 09:47), Max: 36.7 (11-10-20 @ 07:20)  HR: 88 (11-10-20 @ 11:13)  BP: 132/70 (11-10-20 @ 11:13)  SpO2: 92% (11-10-20 @ 11:13)    Output    Gen: No Acute Distress. Pleasant affect.  Pulm: No respiratory distress  Abd: Soft, nontender, nondistended.  Back: R NT in place with dressing C/D/I. Minimal clear pink tinged urine in the NT bag. No palpable hematoma or ecchymosis.   : No suprapubic tenderness. Voiding      LABS:                        12.3   8.55  )-----------( 144      ( 10 Nov 2020 07:14 )             38.8     11-09    135  |  100  |  28<H>  ----------------------------<  91  4.4   |  22  |  1.19    Ca    10.1      09 Nov 2020 16:47      PT/INR - ( 09 Nov 2020 16:47 )   PT: 12.4 sec;   INR: 1.04 ratio        Urine Cx: pending    RADIOLOGY: < from: US Kidney and Bladder (11.09.20 @ 16:37) >  IMPRESSION: Minimal/mild dilatation of the right renal collecting system. No gross right sided hydronephrosis. Atrophic right kidney. Nephrostomy tube not visualized.    Normal-appearing left kidney.    < end of copied text >

## 2020-11-10 NOTE — PROGRESS NOTE ADULT - PROBLEM SELECTOR PLAN 2
Patient s/p right PCN placed by IR today  - Purulent urine was obtained from the procedure/ pending culture results  Empiric Rocephin 1gram started Patient s/p right PCN placed by IR today  - Purulent urine was obtained from the procedure/ pending culture results  - Empiric Rocephin 1gram started Patient s/p right PCN placed by IR today  - Purulent urine was obtained from the procedure/ pending culture results  - Empiric Rocephin 1gram started  -discussed with urology- ?consultation for nephrectomy

## 2020-11-10 NOTE — CONSULT NOTE ADULT - ATTENDING COMMENTS
Pt seen and examined at [facility]. History and imaging reviewed. Discussed case and hx with Dr Hoenig (), Dr Hood and Dr Ordonez. Pt with atrophic, obstructed infected R kidney currently managed with PCN but with advanced dementia. Spoke with pts family. Pt with PCN for a couple of months and dislodged due to dementia. Discussed management options including continued PCN, capped PCNU, attempted embolization and abx wash with subseuqent PCN removal and nephrectomy. Pt without significnat other comorbid conditions. She has no prior abd surgery. Given concerns about inability for pt to tolerate indwelling hardware, discussed nephrectomy at length. Right now, family favors nephrectomy. Plan on continued abx and PCN and will coordinate surgery in near future.

## 2020-11-10 NOTE — PROGRESS NOTE ADULT - SUBJECTIVE AND OBJECTIVE BOX
GAP TEAM PALLIATIVE CARE UNIT PROGRESS NOTE: Displacement of nephrostomy catheter    [  ] Patient on hospice program.    INDICATION FOR PALLIATIVE CARE UNIT SERVICES: Displacement of nephrostomy catheter. Pending IR vs Urology consult for adjustment.      INTERVAL HPI/OVERNIGHT EVENTS: Patient is A&OX2(self and ). Patient s/p right percutaneous nephrostomy tube. Initial dressing wnl. Patient denies any pain or discomfort. Patient did not require any PRN medications overnight.    DNR on chart: Yes      Allergies    No Known Allergies    Intolerances    MEDICATIONS  (STANDING):  cefTRIAXone   IVPB 1000 milliGRAM(s) IV Intermittent every 24 hours  donepezil 10 milliGRAM(s) Oral at bedtime  escitalopram 5 milliGRAM(s) Oral daily  influenza   Vaccine 0.5 milliLiter(s) IntraMuscular once  lactated ringers. 1000 milliLiter(s) (75 mL/Hr) IV Continuous <Continuous>    MEDICATIONS  (PRN):  acetaminophen   Tablet .. 650 milliGRAM(s) Oral every 6 hours PRN Temp greater or equal to 38C (100.4F)  fentaNYL    Injectable 25 MICROGram(s) IV Push every 5 minutes PRN Moderate Pain (4 - 6)  LORazepam   Injectable 0.5 milliGRAM(s) IV Push every 1 hour PRN Agitation  ondansetron Injectable 4 milliGRAM(s) IV Push once PRN Nausea and/or Vomiting  oxyCODONE    IR 5 milliGRAM(s) Oral every 4 hours PRN Severe Pain (7 - 10)    ITEMS UNCHECKED ARE NOT PRESENT    PRESENT SYMPTOMS: [ ]Unable to obtain due to poor mentation   Source if other than patient:  [ ]Family   [ ]Team     Pain: [ ] yes [ X] no  QOL impact -   Location -                    Aggravating factors -  Quality -  Radiation -  Timing-  Severity (0-10 scale):  Minimal acceptable level (0-10 scale):     Dyspnea:                           [ ]Mild [ ]Moderate [ ]Severe  Anxiety:                             [ ]Mild [ ]Moderate [ ]Severe  Fatigue:                             [ ]Mild [ ]Moderate [ ]Severe  Nausea:                             [ ]Mild [ ]Moderate [ ]Severe  Loss of appetite:              [ ]Mild [ ]Moderate [ ]Severe  Constipation:                    [ ]Mild [ ]Moderate [ ]Severe    PAINAD Score:    http://geriatrictoolkit.[email or web address]/painad.pdf (Ctrl +  left click to view)  		  Other Symptoms:  [ ]All other review of systems negative     Palliative Performance Status Version 2:    50     %         http://[facility].org/files/news/palliative_performance_scale_[email or web address].pdf  PHYSICAL EXAM:  Vital Signs Last 24 Hrs  T(C): 36.6 (10 Nov 2020 09:47), Max: 36.7 (10 Nov 2020 07:20)  T(F): 97.9 (10 Nov 2020 09:47), Max: 98.1 (10 Nov 2020 07:20)  HR: 88 (10 Nov 2020 11:) (74 - 88)  BP: 132/70 (10 Nov 2020 11:13) (120/66 - 141/100)  BP(mean): --  RR: 18 (10 Nov 2020 11:13) (15 - 18)  SpO2: 92% (10 Nov 2020 11:) (92% - 98%) I&O's Summary  GENERAL:  [X ]Alert  [X ]Oriented x2   [ ]Lethargic  [ ]Cachexia  [ ]Unarousable  [ ]Verbal  [ ]Non-Verbal  Behavioral:   [ ] Anxiety  [ ] Delirium [ ] Agitation [ X] Other calm  HEENT:  [X ]Normal   [ ]Dry mouth   [ ]ET Tube/Trach  [ ]Oral lesions  PULMONARY:   [X ]Clear [ ]Tachypnea  [ ]Audible excessive secretions   [ ]Rhonchi        [ ]Right [ ]Left [ ]Bilateral  [ ]Crackles        [ ]Right [ ]Left [ ]Bilateral  [ ]Wheezing     [ ]Right [ ]Left [ ]Bilateral  [ ]Diminshed BS [ ]Right [ ]Left [ ]Bilateral    CARDIOVASCULAR:    [X ]Regular [ ]Irregular [ ]Tachy  [ ]Justyn [ ]Murmur [ ]Other  GASTROINTESTINAL:  [ X]Soft  [ ]Distended   [X ]+BS  [ ]Non tender [ ]Tender  [ ]PEG [ ]OGT/ NGT   Last BM:   2020  GENITOURINARY:  [X ]Normal [ ] Incontinent   [ ]Oliguria/Anuria   [ ]Roth  MUSCULOSKELETAL:   [ ]Normal   [X ]Weakness  [ ]Bed/Wheelchair bound [ ]Edema  NEUROLOGIC:   [ ]No focal deficits  [X ] Cognitive impairment  [ ] Dysphagia [ ]Dysarthria [ ] Paresis [ ]Other   SKIN:   [X ]Normal  [ ]Rash   s/p right percutaneous nephrostomy tube  [ ]Pressure ulcer(s)  [ ]y [ ]n  Present on admission      CRITICAL CARE:  [ ] Shock Present  [ ]Septic [ ]Cardiogenic [ ]Neurologic [ ]Hypovolemic  [ ]  Vasopressors [ ]  Inotropes   [ ] Respiratory failure present [ ] Mechanical Ventilation [ ] Non-invasive ventilatory support [ ] High-Flow  [ ] Acute  [ ] Chronic [ ] Hypoxic  [ ] Hypercarbic [ ] Other  [ ] Other organ failure     LABS:                        12.3   8.55  )-----------( 144      ( 10 Nov 2020 07:14 )             38.8       135  |  100  |  28<H>  ----------------------------<  91  4.4   |  22  |  1.19    Ca    10.1      2020 16:47    PT/INR - ( 2020 16:47 )   PT: 12.4 sec;   INR: 1.04 ratio               RADIOLOGY & ADDITIONAL STUDIES:    PROTEIN CALORIE MALNUTRITION: [ ] mild [ ] moderate [ ] severe  [ ] underweight [ ] morbid obesity    https://www.andeal.org/vault/2440/web/files/ONC/Table_Clinical%20Characteristics%20to%20Document%20Malnutrition-White%20JV%20et%20al%432007.pdf    Height (cm): 154.9 (11-10-20 @ 08:37), 157.5 (20 @ 11:35)  Weight (kg): 51.7 (11-10-20 @ 08:37), 46.266 (20 @ 11:35)  BMI (kg/m2): 21.5 (11-10-20 @ 08:37), 18.7 (20 @ 11:35)    [ ] PPSV2 < or = 30% [ ] significant weight loss [ ] poor nutritional intake [ ] anasarca   Artificial Nutrition [ ]     REFERRALS:   [ ]Chaplaincy  [ ] Hospice  [ ]Child Life  [ ]Social Work  [ ]Case management [ ]Holistic Therapy [ ] Physical Therapy [ ] Dietary   Goals of Care Document: SJacinto Lenolauren (20 @ 15:35)  Goals of Care Conversation:   Conversation Discussion:  · Conversation  Hospice Referral  · Conversation Details  HCN RN spoke with son Yoandy via telephone, explained hospice home services vs in-pt services with return understanding. HCN consents emailed to son as requested. Admission to hospice is pending signed consents.      Electronic Signatures:  Sarahy Vivas (RN)  (Signed 24-Aug-2020 15:37)  	Authored: Goals of Care Conversation      Last Updated: 24-Aug-2020 15:37 by Sarahy Vivas (RN)       GAP TEAM PALLIATIVE CARE UNIT PROGRESS NOTE: Displacement of nephrostomy catheter    [  ] Patient on hospice program.    INDICATION FOR PALLIATIVE CARE UNIT SERVICES: Displacement of nephrostomy catheter.       INTERVAL HPI/OVERNIGHT EVENTS: Patient is A&OX2(self and ). Patient s/p right percutaneous nephrostomy tube. Initial dressing wnl. Patient denies any pain or discomfort. Patient did not require any PRN medications overnight.    DNR on chart: Yes      Allergies    No Known Allergies    Intolerances    MEDICATIONS  (STANDING):  cefTRIAXone   IVPB 1000 milliGRAM(s) IV Intermittent every 24 hours  donepezil 10 milliGRAM(s) Oral at bedtime  escitalopram 5 milliGRAM(s) Oral daily  influenza   Vaccine 0.5 milliLiter(s) IntraMuscular once  lactated ringers. 1000 milliLiter(s) (75 mL/Hr) IV Continuous <Continuous>    MEDICATIONS  (PRN):  acetaminophen   Tablet .. 650 milliGRAM(s) Oral every 6 hours PRN Temp greater or equal to 38C (100.4F)  fentaNYL    Injectable 25 MICROGram(s) IV Push every 5 minutes PRN Moderate Pain (4 - 6)  LORazepam   Injectable 0.5 milliGRAM(s) IV Push every 1 hour PRN Agitation  ondansetron Injectable 4 milliGRAM(s) IV Push once PRN Nausea and/or Vomiting  oxyCODONE    IR 5 milliGRAM(s) Oral every 4 hours PRN Severe Pain (7 - 10)    ITEMS UNCHECKED ARE NOT PRESENT    PRESENT SYMPTOMS: [ ]Unable to obtain due to poor mentation   Source if other than patient:  [ ]Family   [ ]Team     Pain: [ ] yes [ X] no  QOL impact -   Location -                    Aggravating factors -  Quality -  Radiation -  Timing-  Severity (0-10 scale):  Minimal acceptable level (0-10 scale):     Dyspnea:                           [ ]Mild [ ]Moderate [ ]Severe  Anxiety:                             [ ]Mild [ ]Moderate [ ]Severe  Fatigue:                             [ ]Mild [ ]Moderate [ ]Severe  Nausea:                             [ ]Mild [ ]Moderate [ ]Severe  Loss of appetite:              [ ]Mild [ ]Moderate [ ]Severe  Constipation:                    [ ]Mild [ ]Moderate [ ]Severe    PAINAD Score:    http://geriatrictoolkit.[facility]/cog/painad.pdf (Ctrl +  left click to view)  		  Other Symptoms:  [ ]All other review of systems negative     Palliative Performance Status Version 2:    50     %         http://npcrc.org/files/news/palliative_performance_scale_ppsv2.pdf  PHYSICAL EXAM:  Vital Signs Last 24 Hrs  T(C): 36.6 (10 Nov 2020 09:47), Max: 36.7 (10 Nov 2020 07:20)  T(F): 97.9 (10 Nov 2020 09:47), Max: 98.1 (10 Nov 2020 07:20)  HR: 88 (10 Nov 2020 11:) (74 - 88)  BP: 132/70 (10 Nov 2020 11:) (120/66 - 141/100)  BP(mean): --  RR: 18 (10 Nov 2020 11:13) (15 - 18)  SpO2: 92% (10 Nov 2020 11:13) (92% - 98%) I&O's Summary  GENERAL:  [X ]Alert  [X ]Oriented x2   [ ]Lethargic  [ ]Cachexia  [ ]Unarousable  [ ]Verbal  [ ]Non-Verbal  Behavioral:   [ ] Anxiety  [ ] Delirium [ ] Agitation [ X] Other calm  HEENT:  [X ]Normal   [ ]Dry mouth   [ ]ET Tube/Trach  [ ]Oral lesions  PULMONARY:   [X ]Clear [ ]Tachypnea  [ ]Audible excessive secretions   [ ]Rhonchi        [ ]Right [ ]Left [ ]Bilateral  [ ]Crackles        [ ]Right [ ]Left [ ]Bilateral  [ ]Wheezing     [ ]Right [ ]Left [ ]Bilateral  [ ]Diminshed BS [ ]Right [ ]Left [ ]Bilateral    CARDIOVASCULAR:    [X ]Regular [ ]Irregular [ ]Tachy  [ ]Justyn [ ]Murmur [ ]Other  GASTROINTESTINAL:  [ X]Soft  [ ]Distended   [X ]+BS  [ ]Non tender [ ]Tender  [ ]PEG [ ]OGT/ NGT   Last BM:   2020  GENITOURINARY:  [X ]Normal [ ] Incontinent   [ ]Oliguria/Anuria   [ ]Roth  MUSCULOSKELETAL:   [ ]Normal   [X ]Weakness  [ ]Bed/Wheelchair bound [ ]Edema  NEUROLOGIC:   [ ]No focal deficits  [X ] Cognitive impairment  [ ] Dysphagia [ ]Dysarthria [ ] Paresis [ ]Other   SKIN:   [X ]Normal  [ ]Rash   s/p right percutaneous nephrostomy tube, dressing wnl  [ ]Pressure ulcer(s)  [ ]y [ ]n  Present on admission      CRITICAL CARE:  [ ] Shock Present  [ ]Septic [ ]Cardiogenic [ ]Neurologic [ ]Hypovolemic  [ ]  Vasopressors [ ]  Inotropes   [ ] Respiratory failure present [ ] Mechanical Ventilation [ ] Non-invasive ventilatory support [ ] High-Flow  [ ] Acute  [ ] Chronic [ ] Hypoxic  [ ] Hypercarbic [ ] Other  [ ] Other organ failure     LABS:                        12.3   8.55  )-----------( 144      ( 10 Nov 2020 07:14 )             38.8       135  |  100  |  28<H>  ----------------------------<  91  4.4   |  22  |  1.19    Ca    10.1      2020 16:47    PT/INR - ( 2020 16:47 )   PT: 12.4 sec;   INR: 1.04 ratio               RADIOLOGY & ADDITIONAL STUDIES:    PROTEIN CALORIE MALNUTRITION: [ ] mild [ ] moderate [ ] severe  [ ] underweight [ ] morbid obesity    https://www.andeal.org/vault/2440/web/files/ONC/Table_Clinical%20Characteristics%20to%20Document%20Malnutrition-White%20JV%20et%20al%562521.pdf    Height (cm): 154.9 (11-10-20 @ 08:37), 157.5 (20 @ 11:35)  Weight (kg): 51.7 (11-10-20 @ 08:37), 46.266 (20 @ 11:35)  BMI (kg/m2): 21.5 (11-10-20 @ 08:37), 18.7 (20 @ 11:35)    [ ] PPSV2 < or = 30% [ ] significant weight loss [ ] poor nutritional intake [ ] anasarca   Artificial Nutrition [ ]     REFERRALS:   [ ]Chaplaincy  [X ] Hospice  [ ]Child Life  [X ]Social Work  [ ]Case management [ ]Holistic Therapy [ ] Physical Therapy [ ] Dietary   Goals of Care Document: SJacinto Lenolauren (20 @ 15:35)  Goals of Care Conversation:   Conversation Discussion:  · Conversation  Hospice Referral  · Conversation Details  HCN RN spoke with son Yoandy via telephone, explained hospice home services vs in-pt services with return understanding. HCN consents emailed to son as requested. Admission to hospice is pending signed consents.      Electronic Signatures:  Sarahy Vivas (RN)  (Signed 24-Aug-2020 15:37)  	Authored: Goals of Care Conversation      Last Updated: 24-Aug-2020 15:37 by Sarahy Vivas (RN)       GAP TEAM PALLIATIVE CARE UNIT PROGRESS NOTE: Displacement of nephrostomy catheter    [  ] Patient on hospice program.    INDICATION FOR PALLIATIVE CARE UNIT SERVICES: Displacement of nephrostomy catheter.       INTERVAL HPI/OVERNIGHT EVENTS: Patient is A&OX2(self and ). Patient s/p right percutaneous nephrostomy tube. Initial dressing wnl. Patient denies any pain or discomfort. Patient did not require any PRN medications overnight. Due to pus being removed during procedure, there was no internalization done. This was explained to patients son at bedside.     DNR on chart: Yes      Allergies    No Known Allergies    Intolerances    MEDICATIONS  (STANDING):  cefTRIAXone   IVPB 1000 milliGRAM(s) IV Intermittent every 24 hours  donepezil 10 milliGRAM(s) Oral at bedtime  escitalopram 5 milliGRAM(s) Oral daily  influenza   Vaccine 0.5 milliLiter(s) IntraMuscular once  lactated ringers. 1000 milliLiter(s) (75 mL/Hr) IV Continuous <Continuous>    MEDICATIONS  (PRN):  acetaminophen   Tablet .. 650 milliGRAM(s) Oral every 6 hours PRN Temp greater or equal to 38C (100.4F)  fentaNYL    Injectable 25 MICROGram(s) IV Push every 5 minutes PRN Moderate Pain (4 - 6)  LORazepam   Injectable 0.5 milliGRAM(s) IV Push every 1 hour PRN Agitation  ondansetron Injectable 4 milliGRAM(s) IV Push once PRN Nausea and/or Vomiting  oxyCODONE    IR 5 milliGRAM(s) Oral every 4 hours PRN Severe Pain (7 - 10)    ITEMS UNCHECKED ARE NOT PRESENT    PRESENT SYMPTOMS: [ ]Unable to obtain due to poor mentation   Source if other than patient:  [ ]Family   [ ]Team     Pain: [ ] yes [ X] no  QOL impact -   Location -                    Aggravating factors -  Quality -  Radiation -  Timing-  Severity (0-10 scale):  Minimal acceptable level (0-10 scale):     Dyspnea:                           [ ]Mild [ ]Moderate [ ]Severe  Anxiety:                             [ ]Mild [ ]Moderate [ ]Severe  Fatigue:                             [ ]Mild [ ]Moderate [ ]Severe  Nausea:                             [ ]Mild [ ]Moderate [ ]Severe  Loss of appetite:              [ ]Mild [ ]Moderate [ ]Severe  Constipation:                    [ ]Mild [ ]Moderate [ ]Severe    PAINAD Score:    http://geriatrictoolkit.[email or web address]/cog/painad.pdf (Ctrl +  left click to view)  		  Other Symptoms:  [ ]All other review of systems negative     Palliative Performance Status Version 2:    50     %         http://[email or web address]/files/news/palliative_performance_scale_[email or web address].pdf  PHYSICAL EXAM:  Vital Signs Last 24 Hrs  T(C): 36.6 (10 Nov 2020 09:47), Max: 36.7 (10 Nov 2020 07:20)  T(F): 97.9 (10 Nov 2020 09:47), Max: 98.1 (10 Nov 2020 07:20)  HR: 88 (10 Nov 2020 11:13) (74 - 88)  BP: 132/70 (10 Nov 2020 11:13) (120/66 - 141/100)  BP(mean): --  RR: 18 (10 Nov 2020 11:13) (15 - 18)  SpO2: 92% (10 Nov 2020 11:13) (92% - 98%) I&O's Summary  GENERAL:  [X ]Alert  [X ]Oriented x2   [ ]Lethargic  [ ]Cachexia  [ ]Unarousable  [ ]Verbal  [ ]Non-Verbal  Behavioral:   [ ] Anxiety  [ ] Delirium [ ] Agitation [ X] Other calm  HEENT:  [X ]Normal   [ ]Dry mouth   [ ]ET Tube/Trach  [ ]Oral lesions  PULMONARY:   [X ]Clear [ ]Tachypnea  [ ]Audible excessive secretions   [ ]Rhonchi        [ ]Right [ ]Left [ ]Bilateral  [ ]Crackles        [ ]Right [ ]Left [ ]Bilateral  [ ]Wheezing     [ ]Right [ ]Left [ ]Bilateral  [ ]Diminshed BS [ ]Right [ ]Left [ ]Bilateral    CARDIOVASCULAR:    [X ]Regular [ ]Irregular [ ]Tachy  [ ]Justyn [ ]Murmur [ ]Other  GASTROINTESTINAL:  [ X]Soft  [ ]Distended   [X ]+BS  [ ]Non tender [ ]Tender  [ ]PEG [ ]OGT/ NGT   Last BM:   2020  GENITOURINARY:  [X ]Normal [ ] Incontinent   [ ]Oliguria/Anuria   [ ]Roth  MUSCULOSKELETAL:   [ ]Normal   [X ]Weakness  [ ]Bed/Wheelchair bound [ ]Edema  NEUROLOGIC:   [ ]No focal deficits  [X ] Cognitive impairment  [ ] Dysphagia [ ]Dysarthria [ ] Paresis [ ]Other   SKIN:   [X ]Normal  [ ]Rash   s/p right percutaneous nephrostomy tube, dressing wnl  [ ]Pressure ulcer(s)  [ ]y [ ]n  Present on admission      CRITICAL CARE:  [ ] Shock Present  [ ]Septic [ ]Cardiogenic [ ]Neurologic [ ]Hypovolemic  [ ]  Vasopressors [ ]  Inotropes   [ ] Respiratory failure present [ ] Mechanical Ventilation [ ] Non-invasive ventilatory support [ ] High-Flow  [ ] Acute  [ ] Chronic [ ] Hypoxic  [ ] Hypercarbic [ ] Other  [ ] Other organ failure     LABS:                        12.3   8.55  )-----------( 144      ( 10 Nov 2020 07:14 )             38.8       135  |  100  |  28<H>  ----------------------------<  91  4.4   |  22  |  1.19    Ca    10.1      2020 16:47    PT/INR - ( 2020 16:47 )   PT: 12.4 sec;   INR: 1.04 ratio          RADIOLOGY & ADDITIONAL STUDIES:  < from: US Kidney and Bladder (20 @ 16:37) >    EXAM:  US KIDNEYS AND BLADDER                            PROCEDURE DATE:  2020            INTERPRETATION:  CLINICAL INFORMATION: Patient incidentally pulled right nephrostomy tube; history of right ureteropelvic junction dilation    COMPARISON: CT abdomen pelvis dated 2020. CT examination 3/5/2019. Renal sonography 2016.    TECHNIQUE: Sonography of the kidneys and bladder.    FINDINGS:    Right kidney: 8.5 cm. Atrophic. Minimal/mild dilatation of the collecting system is suspected. Mild urothelial thickening is noted. No gross hydronephrosis is identified. No calculi. No nephrostomy tube visualized.    Left kidney: 10.0 cm. No renal mass, hydronephrosis or calculi.    Urinary bladder: Partially distended.    IMPRESSION: Minimal/mild dilatation of the right renal collecting system. No gross right sided hydronephrosis. Atrophic right kidney. Nephrostomy tube not visualized.    Normal-appearing left kidney.      BENITEZ RAMOS MD; Resident Radiology  This document has been electronically signed.  TEGAN DELGADO MD; Attending Radiologist  This document has been electronically signed. 2020  5:24PM    < end of copied text >    PROTEIN CALORIE MALNUTRITION: [x ] mild [ ] moderate [ ] severe  [ ] underweight [ ] morbid obesity    https://www.andeal.org/vault/2440/web/files/ONC/Table_Clinical%20Characteristics%20to%20Document%20Malnutrition-White%20JV%20et%20al%2020.pdf    Height (cm): 154.9 (11-10-20 @ 08:37), 157.5 (20 @ 11:35)  Weight (kg): 51.7 (11-10-20 @ 08:37), 46.266 (20 @ 11:35)  BMI (kg/m2): 21.5 (11-10-20 @ 08:37), 18.7 (20 @ 11:35)    [ ] PPSV2 < or = 30% [ ] significant weight loss [ ] poor nutritional intake [ ] anasarca   Artificial Nutrition [ ]     REFERRALS:   [ ]Chaplaincy  [X ] Hospice  [ ]Child Life  [X ]Social Work  [ ]Case management [ ]Holistic Therapy [ ] Physical Therapy [ ] Dietary   Goals of Care Document: KILEY Vivas (20 @ 15:35)  Goals of Care Conversation:   Conversation Discussion:  · Conversation  Hospice Referral  · Conversation Details  HCN RN spoke with dotty Pitt via telephone, explained hospice home services vs in-pt services with return understanding. HCN consents emailed to son as requested. Admission to hospice is pending signed consents.      Electronic Signatures:  Sarahy Vivas (RN)  (Signed 24-Aug-2020 15:37)  	Authored: Goals of Care Conversation      Last Updated: 24-Aug-2020 15:37 by Sarahy Vivas (RN)

## 2020-11-10 NOTE — CONSULT NOTE ADULT - ASSESSMENT
A/P: 88yo female with PMHx of HTN, advanced dementia, recent hospitalization at [place] found to have L pubic ramus fracture, R sacral fracture, and R UPJ dilation, recent UTI admitted for nephrostomy tube dislodgement now s/p IR replacement of R NT on 11/10.    Recs:  - Continue empiric antibiotics  - Follow up urine cultures sent by IR  - Continue NT to drainage.    Will discuss with attending. A/P: 88yo female with PMHx of HTN, advanced dementia, recent hospitalization at [place] found to have L pubic ramus fracture, R sacral fracture, and R UPJ dilation, recent UTI admitted for nephrostomy tube dislodgement now s/p IR replacement of R NT on 11/10.    Recs:  - Continue empiric antibiotics  - Follow up urine cultures sent by IR  - Continue NT to drainage  - Dr. Hoenig discussed plan with medical team

## 2020-11-10 NOTE — SOCIAL HISTORY
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 [de-identified] : No safety concerns identified.

## 2020-11-11 ENCOUNTER — APPOINTMENT (OUTPATIENT)
Dept: UROLOGY | Facility: CLINIC | Age: 85
End: 2020-11-11

## 2020-11-11 DIAGNOSIS — K59.00 CONSTIPATION, UNSPECIFIED: ICD-10-CM

## 2020-11-11 PROCEDURE — 99233 SBSQ HOSP IP/OBS HIGH 50: CPT | Mod: GC

## 2020-11-11 RX ORDER — SENNA PLUS 8.6 MG/1
2 TABLET ORAL AT BEDTIME
Refills: 0 | Status: DISCONTINUED | OUTPATIENT
Start: 2020-11-11 | End: 2020-11-12

## 2020-11-11 RX ADMIN — ESCITALOPRAM OXALATE 5 MILLIGRAM(S): 10 TABLET, FILM COATED ORAL at 12:06

## 2020-11-11 RX ADMIN — SENNA PLUS 2 TABLET(S): 8.6 TABLET ORAL at 22:12

## 2020-11-11 RX ADMIN — DONEPEZIL HYDROCHLORIDE 10 MILLIGRAM(S): 10 TABLET, FILM COATED ORAL at 22:12

## 2020-11-11 RX ADMIN — CEFTRIAXONE 100 MILLIGRAM(S): 500 INJECTION, POWDER, FOR SOLUTION INTRAMUSCULAR; INTRAVENOUS at 14:34

## 2020-11-11 NOTE — PROGRESS NOTE ADULT - PROBLEM SELECTOR PLAN 2
Patient s/p right PCN placed by IR today  - Purulent urine was obtained from the procedure/ pending culture results  - Empiric Rocephin 1gram q24hr (started on 11/10)   -discussed with urology- ?consultation for nephrectomy Patient s/p right PCN placed by IR today  - Purulent urine was obtained from the procedure/ pending culture results  - Empiric Rocephin 1gram q24hr (started on 11/10)   -discussed with urology- consultation for nephrectomy. Awaiting recs

## 2020-11-11 NOTE — PROGRESS NOTE ADULT - SUBJECTIVE AND OBJECTIVE BOX
Interventional Radiology Follow- Up Note    87y F s/p right PCN on 11/10/20 in Interventional Radiology with Dr. mckeon.     Patient seen and examined @ bedside. Site c/d/i with 10 cc output. Cx results pending.   No complaints offered, appeared confused.    Vitals: T(F): 97.9 (11-10-20 @ 09:47), Max: 97.9 (11-10-20 @ 09:47)  HR: 88 (11-10-20 @ 11:13) (74 - 88)  BP: 132/70 (11-10-20 @ 11:13) (120/66 - 141/100)  RR: 18 (11-10-20 @ 11:13) (15 - 18)  SpO2: 92% (11-10-20 @ 11:13) (92% - 98%)  Wt(kg): --    LABS:                        12.3   8.55  )-----------( 144      ( 10 Nov 2020 07:14 )             38.8     11-09    135  |  100  |  28<H>  ----------------------------<  91  4.4   |  22  |  1.19    Ca    10.1      09 Nov 2020 16:47      PT/INR - ( 09 Nov 2020 16:47 )   PT: 12.4 sec;   INR: 1.04 ratio         I&O's Detail    10 Nov 2020 07:01  -  11 Nov 2020 07:00  --------------------------------------------------------  IN:  Total IN: 0 mL    OUT:    Drain (mL): 10 mL  Total OUT: 10 mL    Total NET: -10 mL      PHYSICAL EXAM:  General: Nontoxic, in NAD  Neuro:  Alert but disoriented/confused  Lung: Respirations nonlabored, good inspiratory effort  Abdomen: Soft, non-distended  Extremities: No pedal edema    Impression: 87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    PMH Dementia  HTN (hypertension)    Plan:  -continue to monitor output    -follow-up cultures  -trend vitals, labs  -global care per primary team  -will discuss with IR attending     Please call IR at extension [phone] with any questions, concerns, or issues regarding above.

## 2020-11-11 NOTE — PROGRESS NOTE ADULT - SUBJECTIVE AND OBJECTIVE BOX
GAP TEAM PALLIATIVE CARE UNIT PROGRESS NOTE:      [ x ] Patient on hospice program.    INDICATION FOR PALLIATIVE CARE UNIT SERVICES: Home hospice patient - Displacement of nephrostomy catheter     INTERVAL HPI/OVERNIGHT EVENTS: No acute events overnight. Over the past 24 hours, she did not require PRN medications. She was seen this morning, pleasantly confused with aid, Ignacia, at bedside. Per Ignacia, patient went to bed late but slept well, does not appear to be uncomfortable or symptomatic.     DNR on chart: Yes      Allergies    No Known Allergies    Intolerances    MEDICATIONS  (STANDING):  cefTRIAXone   IVPB 1000 milliGRAM(s) IV Intermittent every 24 hours  donepezil 10 milliGRAM(s) Oral at bedtime  escitalopram 5 milliGRAM(s) Oral daily  influenza   Vaccine 0.5 milliLiter(s) IntraMuscular once    MEDICATIONS  (PRN):  acetaminophen   Tablet .. 650 milliGRAM(s) Oral every 6 hours PRN Temp greater or equal to 38C (100.4F)  LORazepam   Injectable 0.5 milliGRAM(s) IV Push every 1 hour PRN Agitation  ondansetron Injectable 4 milliGRAM(s) IV Push once PRN Nausea and/or Vomiting  oxyCODONE    IR 5 milliGRAM(s) Oral every 4 hours PRN Severe Pain (7 - 10)    ITEMS UNCHECKED ARE NOT PRESENT    PRESENT SYMPTOMS: [x ] Patient not a reliable historian as she is pleasantly confused.   Source if other than patient:  [ x]Family, bedside Aid   [x ]Team     Pain: [ ] yes [x ] no  QOL impact -   Location -                    Aggravating factors -  Quality -  Radiation -  Timing-  Severity (0-10 scale):  Minimal acceptable level (0-10 scale):     Dyspnea:                           [ ]Mild [ ]Moderate [ ]Severe  Anxiety:                             [ ]Mild [ ]Moderate [ ]Severe  Fatigue:                             [ ]Mild [ ]Moderate [ ]Severe  Nausea:                             [ ]Mild [ ]Moderate [ ]Severe  Loss of appetite:              [ ]Mild [ ]Moderate [ ]Severe  Constipation:                    [ ]Mild [ ]Moderate [ ]Severe    PAINAD Score:     http://geriatrictoolkit.[place].[facility]/cog/painad.pdf (Ctrl +  left click to view)  		  Other Symptoms:   [ x]All other review of systems negative     Palliative Performance Status Version 2:    50-60     %         http://npcrc.org/files/news/palliative_performance_scale_ppsv2.pdf  PHYSICAL EXAM:  Vital Signs Last 24 Hrs  T(C): 36.7 (11 Nov 2020 10:26), Max: 36.7 (11 Nov 2020 10:26)  T(F): 98 (11 Nov 2020 10:26), Max: 98 (11 Nov 2020 10:26)  HR: 85 (11 Nov 2020 10:26) (85 - 85)  BP: 120/77 (11 Nov 2020 10:26) (120/77 - 120/77)  BP(mean): --  RR: 18 (11 Nov 2020 10:26) (18 - 18)  SpO2: 94% (11 Nov 2020 10:26) (94% - 94%) I&O's Summary    10 Nov 2020 07:01  -  11 Nov 2020 07:00  --------------------------------------------------------  IN: 0 mL / OUT: 10 mL / NET: -10 mL    GENERAL: Pleasantly confused   [x ]Alert  [x ]Oriented x 0  [ ]Lethargic  [ ]Cachexia  [ ]Unarousable  [x ]Verbal  [ ]Non-Verbal  Behavioral:   [ ] Anxiety  [ ] Delirium [ ] Agitation [x ] Other- Pleasantly demented   HEENT:  [x ]Normal   [ ]Dry mouth   [ ]ET Tube/Trach  [ ]Oral lesions  PULMONARY:   [x ]Clear [ ]Tachypnea  [ ]Audible excessive secretions   [ ]Rhonchi        [ ]Right [ ]Left [ ]Bilateral  [ ]Crackles        [ ]Right [ ]Left [ ]Bilateral  [ ]Wheezing     [ ]Right [ ]Left [ ]Bilateral  [ ]Diminshed BS [ ]Right [ ]Left [ ]Bilateral    CARDIOVASCULAR:    [x ]Regular [ ]Irregular [ ]Tachy  [ ]Justyn [ ]Murmur [ ]Other  GASTROINTESTINAL:   [x ]Soft  [ ]Distended   [ x]+BS  [ x]Non tender [ ]Tender  [ ]PEG [ ]OGT/ NGT   Last BM: 11/7      GENITOURINARY: +right nephrostomy tube with minimal output   [ ]Normal [ ] Incontinent   [ ]Oliguria/Anuria   [ ]Roth  MUSCULOSKELETAL:   [ x]Normal   [ ]Weakness  [ ]Bed/Wheelchair bound [ ]Edema  NEUROLOGIC:   [ ]No focal deficits  [x ] Cognitive impairment  [ ] Dysphagia [ ]Dysarthria [ ] Paresis [ ]Other   SKIN:   [x ]Normal  [ ]Rash     [ ]Pressure ulcer(s)  [ ]y [ ]n  Present on admission      CRITICAL CARE:  [ ] Shock Present  [ ]Septic [ ]Cardiogenic [ ]Neurologic [ ]Hypovolemic  [ ]  Vasopressors [ ]  Inotropes   [ ] Respiratory failure present [ ] Mechanical Ventilation [ ] Non-invasive ventilatory support [ ] High-Flow  [ ] Acute  [ ] Chronic [ ] Hypoxic  [ ] Hypercarbic [ ] Other  [ ] Other organ failure     LABS:                        12.3   8.55  )-----------( 144      ( 10 Nov 2020 07:14 )             38.8   11-09    135  |  100  |  28<H>  ----------------------------<  91  4.4   |  22  |  1.19    Ca    10.1      09 Nov 2020 16:47    PT/INR - ( 09 Nov 2020 16:47 )   PT: 12.4 sec;   INR: 1.04 ratio         RADIOLOGY & ADDITIONAL STUDIES:   < from: IR Procedure (11.10.20 @ 09:37) >  Impression:  Successful placement of a right 8.5 [place] percutaneous nephrostomy tube using ultrasound and fluoroscopic guidance as above. Pyonephrosis noted with specimen sent for culture.    < end of copied text >    PROTEIN CALORIE MALNUTRITION: [ ] mild [ ] moderate [ ] severe  [ ] underweight [ ] morbid obesity    https://www.andeal.org/vault/7000/web/files/ONC/Table_Clinical%20Characteristics%20to%20Document%20Malnutrition-White%20JV%20et%20al%110681.pdf    Height (cm): 154.9 (11-10-20 @ 08:37), 157.5 (08-24-20 @ 11:35)  Weight (kg): 51.7 (11-10-20 @ 08:37), 46.266 (08-24-20 @ 11:35)  BMI (kg/m2): 21.5 (11-10-20 @ 08:37), 18.7 (08-24-20 @ 11:35)    [ ] PPSV2 < or = 30% [ ] significant weight loss [ ] poor nutritional intake [ ] anasarca   Artificial Nutrition [ ]     REFERRALS:   [ ]Chaplaincy  [x ] Hospice  [ ]Child Life  [ ]Social Work  [ ]Case management [ ]Holistic Therapy [ ] Physical Therapy [ ] Dietary   Goals of Care Document: KILEY Vivas (08-24-20 @ 15:35)  Goals of Care Conversation:   Conversation Discussion:  · Conversation  Hospice Referral  · Conversation Details  HCN RN spoke with dotty Pitt via telephone, explained hospice home services vs in-pt services with return understanding. HCN consents emailed to son as requested. Admission to hospice is pending signed consents.      Electronic Signatures:  Sarahy Vivas (RN)  (Signed 24-Aug-2020 15:37)  	Authored: Goals of Care Conversation      Last Updated: 24-Aug-2020 15:37 by Sarahy Vivas (RN)

## 2020-11-11 NOTE — PROGRESS NOTE ADULT - PROBLEM SELECTOR PLAN 1
Patient is a fall risk  -Follow safety protocol ( bed alarm activated, hourly rounds, call bell within reach)  - Reorient patient as needed  --c/w Aricept  -Family members at the bedside. Patient is a fall risk  -Follow safety protocol ( bed alarm activated, hourly rounds, call bell within reach)  - Reorient patient as needed  --c/w Aricept  -Family members and aid at the bedside.

## 2020-11-12 ENCOUNTER — TRANSCRIPTION ENCOUNTER (OUTPATIENT)
Age: 85
End: 2020-11-12

## 2020-11-12 VITALS
SYSTOLIC BLOOD PRESSURE: 118 MMHG | OXYGEN SATURATION: 93 % | RESPIRATION RATE: 18 BRPM | DIASTOLIC BLOOD PRESSURE: 70 MMHG | HEART RATE: 76 BPM | TEMPERATURE: 98 F

## 2020-11-12 PROCEDURE — 86901 BLOOD TYPING SEROLOGIC RH(D): CPT

## 2020-11-12 PROCEDURE — 90686 IIV4 VACC NO PRSV 0.5 ML IM: CPT

## 2020-11-12 PROCEDURE — 86850 RBC ANTIBODY SCREEN: CPT

## 2020-11-12 PROCEDURE — 50432 PLMT NEPHROSTOMY CATHETER: CPT

## 2020-11-12 PROCEDURE — 87186 SC STD MICRODIL/AGAR DIL: CPT

## 2020-11-12 PROCEDURE — 76770 US EXAM ABDO BACK WALL COMP: CPT

## 2020-11-12 PROCEDURE — U0003: CPT

## 2020-11-12 PROCEDURE — 80048 BASIC METABOLIC PNL TOTAL CA: CPT

## 2020-11-12 PROCEDURE — 85027 COMPLETE CBC AUTOMATED: CPT

## 2020-11-12 PROCEDURE — 87086 URINE CULTURE/COLONY COUNT: CPT

## 2020-11-12 PROCEDURE — 85610 PROTHROMBIN TIME: CPT

## 2020-11-12 PROCEDURE — 86900 BLOOD TYPING SEROLOGIC ABO: CPT

## 2020-11-12 PROCEDURE — 99233 SBSQ HOSP IP/OBS HIGH 50: CPT | Mod: GC

## 2020-11-12 RX ORDER — CEFUROXIME AXETIL 250 MG
250 TABLET ORAL EVERY 12 HOURS
Refills: 0 | Status: DISCONTINUED | OUTPATIENT
Start: 2020-11-13 | End: 2020-11-12

## 2020-11-12 RX ORDER — OXYCODONE HYDROCHLORIDE 5 MG/1
1 TABLET ORAL
Qty: 21 | Refills: 0
Start: 2020-11-12 | End: 2020-11-18

## 2020-11-12 RX ORDER — CEFUROXIME AXETIL 250 MG
1 TABLET ORAL
Qty: 14 | Refills: 0
Start: 2020-11-12 | End: 2020-11-18

## 2020-11-12 RX ADMIN — INFLUENZA VIRUS VACCINE 0.5 MILLILITER(S): 15; 15; 15; 15 SUSPENSION INTRAMUSCULAR at 12:27

## 2020-11-12 RX ADMIN — OXYCODONE HYDROCHLORIDE 5 MILLIGRAM(S): 5 TABLET ORAL at 12:40

## 2020-11-12 RX ADMIN — ESCITALOPRAM OXALATE 5 MILLIGRAM(S): 10 TABLET, FILM COATED ORAL at 11:21

## 2020-11-12 NOTE — PROGRESS NOTE ADULT - PROBLEM SELECTOR PLAN 2
Patient s/p right PCN placed by IR on 11/10   - Purulent urine was obtained from the procedure. Urine culture results + E.coli   - Empiric Rocephin 1gram q24hr (started on 11/10) transitioned to PO Ceftin 250mg bid x7 more days for a total of 10 days   -discussed with urology- consultation for nephrectomy. Awaiting recs

## 2020-11-12 NOTE — DISCHARGE NOTE NURSING/CASE MANAGEMENT/SOCIAL WORK - PATIENT PORTAL LINK FT
You can access the FollowMyHealth Patient Portal offered by [facility] by registering at the following website: [email or web address]. By joining [employer]’s FollowMyHealth portal, you will also be able to view your health information using other applications (apps) compatible with our system.

## 2020-11-12 NOTE — PROGRESS NOTE ADULT - PROVIDER SPECIALTY LIST ADULT
Intervent Radiology
Palliative Care

## 2020-11-12 NOTE — DISCHARGE NOTE PROVIDER - NSDCCPCAREPLAN_GEN_ALL_CORE_FT
PRINCIPAL DISCHARGE DIAGNOSIS  Diagnosis: Acute pyelonephritis  Assessment and Plan of Treatment: Your Nephrostomy tube was replaced and you were started on IV Ceftriaxone which was transitioned to oral Ceftin 250mg twice per day for a total of 10 days. You will be sent home with 7 more days of antibiotics.

## 2020-11-12 NOTE — DISCHARGE NOTE PROVIDER - NSDCCPTREATMENT_GEN_ALL_CORE_FT
PRINCIPAL PROCEDURE  Procedure: Replacement, nephrostomy tube, percutaneous  Findings and Treatment:

## 2020-11-12 NOTE — DISCHARGE NOTE PROVIDER - PROVIDER TOKENS
FREE:[LAST:[[facility]FIRST:[HCN],PHONE:[(   )    -],FAX:[(   )    -],FOLLOWUP:[Routine]]

## 2020-11-12 NOTE — DISCHARGE NOTE PROVIDER - NSDCMRMEDTOKEN_GEN_ALL_CORE_FT
acetaminophen 325 mg oral tablet: 3 tab(s) orally every 8 hours  amLODIPine 10 mg oral tablet: 1 tab(s) orally once a day  cefuroxime 250 mg oral tablet: 1 tab(s) orally every 12 hours  donepezil 10 mg oral tablet: 1 tab(s) orally once a day (at bedtime)  ferrous sulfate 325 mg (65 mg elemental iron) oral tablet: 1 tab(s) orally once a day  Lexapro 5 mg oral tablet: 1 tab(s) orally once a day  oxyCODONE 5 mg oral capsule: 1 cap(s) orally every 8 hours MDD:3   acetaminophen 325 mg oral tablet: 3 tab(s) orally every 8 hours  amLODIPine 10 mg oral tablet: 1 tab(s) orally once a day  cefuroxime 250 mg oral tablet: 1 tab(s) orally every 12 hours  donepezil 10 mg oral tablet: 1 tab(s) orally once a day (at bedtime)  ferrous sulfate 325 mg (65 mg elemental iron) oral tablet: 1 tab(s) orally once a day  Lexapro 5 mg oral tablet: 1 tab(s) orally once a day   acetaminophen 325 mg oral tablet: 3 tab(s) orally every 8 hours  amLODIPine 10 mg oral tablet: 1 tab(s) orally once a day  cefuroxime 250 mg oral tablet: 1 tab(s) orally every 12 hours  donepezil 10 mg oral tablet: 1 tab(s) orally once a day (at bedtime)  ferrous sulfate 325 mg (65 mg elemental iron) oral tablet: 1 tab(s) orally once a day  Lexapro 5 mg oral tablet: 1 tab(s) orally once a day  oxyCODONE 5 mg oral capsule: 1 cap(s) orally every 8 hours, As Needed -for severe pain MDD:3

## 2020-11-12 NOTE — PROGRESS NOTE ADULT - ASSESSMENT
7-year-old woman with HTN, advanced dementia, recent hospitalization at [place] found to have L pubic ramus fracture, R sacral fracture, and R UPJ dilation, recent UTI, presenting with Nephrostomy tube inadvertently pulled by patient pending Urology consult for adjustment.
86 y/o female with congential right UPJ s/p IR drain for palliation, presents with PCN pulled completely out  -No tract present on exam, would need de ronda PCN with anesthesia   -Currently asymptomatic, no signs of infection or obstructive uropathy  
7-year-old woman with HTN, advanced dementia, recent hospitalization at [place] found to have L pubic ramus fracture, R sacral fracture, and R UPJ dilation, recent UTI, presenting with Nephrostomy tube inadvertently pulled by patient. Right PCN placed today by IR.
87-year-old woman with HTN, advanced dementia, recent hospitalization at [place] found to have L pubic ramus fracture, R sacral fracture, and R UPJ dilation, recent UTI, presented with Nephrostomy tube inadvertently pulled by patient. Right PCN placed on 11/10. Case was discussed with Urology and outpatient PCP, family is considering outpatient nephrectomy. Patient otherwise stable for dispo home with hospice services.
87-year-old woman with HTN, advanced dementia, recent hospitalization at [place] found to have L pubic ramus fracture, R sacral fracture, and R UPJ dilation, recent UTI, presented with Nephrostomy tube inadvertently pulled by patient. Right PCN placed on 11/10.

## 2020-11-12 NOTE — DISCHARGE NOTE PROVIDER - HOSPITAL COURSE
87-year-old female on Home Hospice with HTN, advanced dementia, recent hospitalization at [place] found to have L pubic ramus fracture, R sacral fracture, and R UPJ dilation, recent UTI, presented with Nephrostomy tube inadvertently pulled by patient. Right PCN placed on 11/10. She was started on IV Ceftriaxone 1g q24 hours. Urine cultures returned +E.coli and she was transitioned to PO Ceftin 250mg BID x7 more days for a total of 10 days. Urology evaluated patient and family is considering outpatient nephrectomy. Patient will remain on home hospice at this time. She is stable for discharge to home with continued hospice services. Family aware and agreeable to current plan.

## 2020-11-12 NOTE — PROGRESS NOTE ADULT - PROBLEM SELECTOR PLAN 1
Patient is a fall risk  -Follow safety protocol ( bed alarm activated, hourly rounds, call bell within reach)  - Reorient patient as needed  --c/w Aricept  -Family members and aid at the bedside.

## 2020-11-12 NOTE — PROGRESS NOTE ADULT - ATTENDING COMMENTS
I agree with above assessment and plan and I discussed with Dr. Duron.
Per Dr. Hoenig, patient needs PCN access for subsequent outpatient urologic interventions. Will attempt right PCN tomorrow with anesthesia
AS above, I performed procedure with assistance from Dr. Duron and was present the entire time.  Internalization was deferred at this time due to pyonephrosis and the risk of sepsis.
Patient seen and examined and agree with the above documentation with the following additions:   Patient is s/p R nephrostomy tube today; was planned for internalization but due to presence of pus, nephrostomy tube placed instead.  Cultures pending. Empiric ceftriaxone started today, will f/u cultures and narrow. Plan discussed with son at bedside. Due to chronically infected kidney, will consider evaluation for nephrectomy. Discussed with PCP Dr. Hood and primary urologist Dr. Hoenig. Patient asymptomatic and will restart diet. Rest of plan as documented above.
Patient seen and examined and agree with the above documentation with the following additions:   Patient s/p self removal of nephrostomy tube. Admitted to PCU for management. IR and urology evaluations for consideration of nephrostomy tube replacement vs. nephroureteral cath. Will order labs in anticipation of procedure, possibly 11/10. Continue care in PCU. Discussed with urology, outpatient PCP Dr. Hood and HCN. Oxycodone PRN for pain, but has not required any PRNs since admission. discussed with family at bedside.
Patient seen and examined earlier this morning and agree with the above documentation.  transitioned to oral abx after culture results reviewed. patient medically stable for discharge today.  discussed with PCP and family.
Patient seen and examined and agree with the above documentation with the following additions:   no acute overnight events, comfortable without complaints.  Awaiting culture results, remains on rocephin. Awaiting urology consultation to discuss elective nephrectomy with family.  continue care on PCU for now. Medications available prn for symptoms. Discussed with PCP, Dr. Hood.  Rest of management as above

## 2020-11-12 NOTE — PROGRESS NOTE ADULT - PROBLEM SELECTOR PLAN 5
Discussed with HCN, Red team GUILLAUME Hodge that patient is stable for d/c after replacement of PCN and transition to PO abx. Family is considering outpatient nephrectomy. At this time, patient will continue on home hospice until that nephrectomy decision is made.

## 2020-11-12 NOTE — PROGRESS NOTE ADULT - PROBLEM SELECTOR PLAN 4
DNR/DNI   patient on home hospice.
DNR/DNI   patient on home hospice.
continue care in PCU, discussed with patients son at bedside.
continue care in PCU, discussed with patients son at bedside.  Case discussed with PCU Dr. Hood as well.

## 2020-11-12 NOTE — PROGRESS NOTE ADULT - SUBJECTIVE AND OBJECTIVE BOX
GAP TEAM PALLIATIVE CARE UNIT PROGRESS NOTE:      [ x ] Patient on hospice program.    INDICATION FOR PALLIATIVE CARE UNIT SERVICES: Replacement of nephrostomy tube     INTERVAL HPI/OVERNIGHT EVENTS: No acute events overnight. She did not require PRN medications for symptoms. Patient seen this morning, continues to be pleasantly confused, asymptomatic. Aid at bedside, Ignacia, had no concerns. No concerns from nursing.     DNR on chart: Yes    Allergies    No Known Allergies    Intolerances    MEDICATIONS  (STANDING):  donepezil 10 milliGRAM(s) Oral at bedtime  escitalopram 5 milliGRAM(s) Oral daily  influenza   Vaccine 0.5 milliLiter(s) IntraMuscular once  senna 2 Tablet(s) Oral at bedtime    MEDICATIONS  (PRN):  acetaminophen   Tablet .. 650 milliGRAM(s) Oral every 6 hours PRN Temp greater or equal to 38C (100.4F)  bisacodyl Suppository 10 milliGRAM(s) Rectal every 24 hours PRN Constipation  LORazepam   Injectable 0.5 milliGRAM(s) IV Push every 1 hour PRN Agitation  ondansetron Injectable 4 milliGRAM(s) IV Push once PRN Nausea and/or Vomiting  oxyCODONE    IR 5 milliGRAM(s) Oral every 4 hours PRN Severe Pain (7 - 10)    ITEMS UNCHECKED ARE NOT PRESENT    PRESENT SYMPTOMS: [x ] Patient not a reliable historian as she is pleasantly confused.   Source if other than patient:  [ x]Bedside Aid   [x ]Team     Pain: [ ] yes [x ] no  QOL impact -   Location -                    Aggravating factors -  Quality -  Radiation -  Timing-  Severity (0-10 scale):  Minimal acceptable level (0-10 scale):     Dyspnea:                           [ ]Mild [ ]Moderate [ ]Severe  Anxiety:                             [ ]Mild [ ]Moderate [ ]Severe  Fatigue:                             [ ]Mild [ ]Moderate [ ]Severe  Nausea:                             [ ]Mild [ ]Moderate [ ]Severe  Loss of appetite:              [ ]Mild [ ]Moderate [ ]Severe  Constipation:                    [ ]Mild [ ]Moderate [ ]Severe    PAINAD Score:     http://geriatrictoolkit.[place].edu/cog/painad.pdf (Ctrl +  left click to view)  		  Other Symptoms:   [ x]All other review of systems negative     Palliative Performance Status Version 2:    60     %         http://npcrc.org/files/news/palliative_performance_scale_ppsv2.pdf    PHYSICAL EXAM:  Vital Signs Last 24 Hrs  T(C): 36.7 (12 Nov 2020 09:57), Max: 36.7 (12 Nov 2020 09:57)  T(F): 98 (12 Nov 2020 09:57), Max: 98 (12 Nov 2020 09:57)  HR: 76 (12 Nov 2020 09:57) (76 - 76)  BP: 118/70 (12 Nov 2020 09:57) (118/70 - 118/70)  RR: 18 (12 Nov 2020 09:57) (18 - 18)  SpO2: 93% (12 Nov 2020 09:57) (93% - 93%) I&O's Summary    11 Nov 2020 07:01  -  12 Nov 2020 07:00  --------------------------------------------------------  IN: 0 mL / OUT: 10 mL / NET: -10 mL    GENERAL: Pleasantly confused   [x ]Alert  [x ]Oriented x 0  [ ]Lethargic  [ ]Cachexia  [ ]Unarousable  [x ]Verbal  [ ]Non-Verbal  Behavioral:   [ ] Anxiety  [ ] Delirium [ ] Agitation [x ] Other- Pleasantly demented   HEENT:  [x ]Normal   [ ]Dry mouth   [ ]ET Tube/Trach  [ ]Oral lesions  PULMONARY:   [x ]Clear [ ]Tachypnea  [ ]Audible excessive secretions   [ ]Rhonchi        [ ]Right [ ]Left [ ]Bilateral  [ ]Crackles        [ ]Right [ ]Left [ ]Bilateral  [ ]Wheezing     [ ]Right [ ]Left [ ]Bilateral  [ ]Diminshed BS [ ]Right [ ]Left [ ]Bilateral    CARDIOVASCULAR:    [x ]Regular [ ]Irregular [ ]Tachy  [ ]Justyn [ ]Murmur [ ]Other  GASTROINTESTINAL:   [x ]Soft  [ ]Distended   [ x]+BS  [ x]Non tender [ ]Tender  [ ]PEG [ ]OGT/ NGT   Last BM: 11/11- small bm per aid       GENITOURINARY: +right nephrostomy tube with minimal output   [ ]Normal [ ] Incontinent   [ ]Oliguria/Anuria   [ ]Roth  MUSCULOSKELETAL:   [ x]Normal   [ ]Weakness  [ ]Bed/Wheelchair bound [ ]Edema  NEUROLOGIC:   [ ]No focal deficits  [x ] Cognitive impairment  [ ] Dysphagia [ ]Dysarthria [ ] Paresis [ ]Other   SKIN:   [x ]Normal  [ ]Rash     [ ]Pressure ulcer(s)  [ ]y [ ]n  Present on admission      CRITICAL CARE:  [ ] Shock Present  [ ]Septic [ ]Cardiogenic [ ]Neurologic [ ]Hypovolemic  [ ]  Vasopressors [ ]  Inotropes   [ ] Respiratory failure present [ ] Mechanical Ventilation [ ] Non-invasive ventilatory support [ ] High-Flow  [ ] Acute  [ ] Chronic [ ] Hypoxic  [ ] Hypercarbic [ ] Other  [ ] Other organ failure     LABS: n/a    RADIOLOGY & ADDITIONAL STUDIES: n/a    PROTEIN CALORIE MALNUTRITION: [ ] mild [ ] moderate [ ] severe  [ ] underweight [ ] morbid obesity    https://www.andeal.org/vault/2440/web/files/ONC/Table_Clinical%20Characteristics%20to%20Document%20Malnutrition-White%20JV%20et%20al%637244.pdf    Height (cm): 154.9 (11-10-20 @ 08:37), 157.5 (08-24-20 @ 11:35)  Weight (kg): 51.7 (11-10-20 @ 08:37), 46.266 (08-24-20 @ 11:35)  BMI (kg/m2): 21.5 (11-10-20 @ 08:37), 18.7 (08-24-20 @ 11:35)    [ ] PPSV2 < or = 30% [ ] significant weight loss [ ] poor nutritional intake [ ] anasarca   Artificial Nutrition [ ]     REFERRALS:   [ ]Chaplaincy  [ x] Hospice  [ ]Child Life  [ ]Social Work  [ ]Case management [ ]Holistic Therapy [ ] Physical Therapy [ ] Dietary   Goals of Care Document: KILEY Vivas (08-24-20 @ 15:35)  Goals of Care Conversation:   Conversation Discussion:  · Conversation  Hospice Referral  · Conversation Details  HCN RN spoke with son Yoandy via telephone, explained hospice home services vs in-pt services with return understanding. HCN consents emailed to son as requested. Admission to hospice is pending signed consents.      Electronic Signatures:  Sarahy Vivas (RN)  (Signed 24-Aug-2020 15:37)  	Authored: Goals of Care Conversation      Last Updated: 24-Aug-2020 15:37 by Sarahy Vivas (RN)

## 2020-11-12 NOTE — PROGRESS NOTE ADULT - REASON FOR ADMISSION
Displacement of nephrostomy catheter
Home hospice patient for nephrostomy tube replacement
replacement of nephrostomy tube

## 2020-11-14 LAB
-  AMPICILLIN/SULBACTAM: SIGNIFICANT CHANGE UP
-  CEFAZOLIN: SIGNIFICANT CHANGE UP
-  GENTAMICIN: SIGNIFICANT CHANGE UP
-  OXACILLIN: SIGNIFICANT CHANGE UP
-  PENICILLIN: SIGNIFICANT CHANGE UP
-  RIFAMPIN: SIGNIFICANT CHANGE UP
-  TETRACYCLINE: SIGNIFICANT CHANGE UP
-  TRIMETHOPRIM/SULFAMETHOXAZOLE: SIGNIFICANT CHANGE UP
-  VANCOMYCIN: SIGNIFICANT CHANGE UP
CULTURE RESULTS: SIGNIFICANT CHANGE UP
METHOD TYPE: SIGNIFICANT CHANGE UP
ORGANISM # SPEC MICROSCOPIC CNT: SIGNIFICANT CHANGE UP
ORGANISM # SPEC MICROSCOPIC CNT: SIGNIFICANT CHANGE UP
SPECIMEN SOURCE: SIGNIFICANT CHANGE UP

## 2020-11-16 ENCOUNTER — RX RENEWAL (OUTPATIENT)
Age: 85
End: 2020-11-16

## 2020-11-20 ENCOUNTER — NON-APPOINTMENT (OUTPATIENT)
Age: 85
End: 2020-11-20

## 2020-11-20 ENCOUNTER — APPOINTMENT (OUTPATIENT)
Dept: GERIATRICS | Facility: CLINIC | Age: 85
End: 2020-11-20
Payer: MEDICARE

## 2020-11-20 VITALS
BODY MASS INDEX: 18.65 KG/M2 | HEART RATE: 84 BPM | OXYGEN SATURATION: 94 % | TEMPERATURE: 97.3 F | DIASTOLIC BLOOD PRESSURE: 70 MMHG | HEIGHT: 64 IN | WEIGHT: 109.25 LBS | SYSTOLIC BLOOD PRESSURE: 120 MMHG | RESPIRATION RATE: 15 BRPM

## 2020-11-20 DIAGNOSIS — N11.9 CHRONIC TUBULO-INTERSTITIAL NEPHRITIS, UNSPECIFIED: ICD-10-CM

## 2020-11-20 PROCEDURE — 99495 TRANSJ CARE MGMT MOD F2F 14D: CPT | Mod: 25,GW

## 2020-11-20 PROCEDURE — 93000 ELECTROCARDIOGRAM COMPLETE: CPT

## 2020-12-01 ENCOUNTER — RESULT CHARGE (OUTPATIENT)
Age: 85
End: 2020-12-01

## 2020-12-02 DIAGNOSIS — Z01.818 ENCOUNTER FOR OTHER PREPROCEDURAL EXAMINATION: ICD-10-CM

## 2020-12-04 ENCOUNTER — RX RENEWAL (OUTPATIENT)
Age: 85
End: 2020-12-04

## 2020-12-08 DIAGNOSIS — Z20.828 CONTACT WITH AND (SUSPECTED) EXPOSURE TO OTHER VIRAL COMMUNICABLE DISEASES: ICD-10-CM

## 2020-12-09 LAB
ALBUMIN SERPL ELPH-MCNC: 4 G/DL
ALP BLD-CCNC: 64 U/L
ALT SERPL-CCNC: 15 U/L
ANION GAP SERPL CALC-SCNC: 12 MMOL/L
APTT BLD: 27.4 SEC
AST SERPL-CCNC: 25 U/L
BASOPHILS # BLD AUTO: 0.02 K/UL
BASOPHILS NFR BLD AUTO: 0.2 %
BILIRUB SERPL-MCNC: 0.3 MG/DL
BUN SERPL-MCNC: 27 MG/DL
CALCIUM SERPL-MCNC: 9.6 MG/DL
CHLORIDE SERPL-SCNC: 106 MMOL/L
CO2 SERPL-SCNC: 22 MMOL/L
CREAT SERPL-MCNC: 1.2 MG/DL
EOSINOPHIL # BLD AUTO: 0.22 K/UL
EOSINOPHIL NFR BLD AUTO: 2.4 %
GLUCOSE SERPL-MCNC: 96 MG/DL
HCT VFR BLD CALC: 37.7 %
HGB BLD-MCNC: 12 G/DL
IMM GRANULOCYTES NFR BLD AUTO: 0.2 %
INR PPP: 0.96 RATIO
LYMPHOCYTES # BLD AUTO: 4.38 K/UL
LYMPHOCYTES NFR BLD AUTO: 47.4 %
MAN DIFF?: NORMAL
MCHC RBC-ENTMCNC: 30.8 PG
MCHC RBC-ENTMCNC: 31.8 GM/DL
MCV RBC AUTO: 96.9 FL
MONOCYTES # BLD AUTO: 0.59 K/UL
MONOCYTES NFR BLD AUTO: 6.4 %
NEUTROPHILS # BLD AUTO: 4.02 K/UL
NEUTROPHILS NFR BLD AUTO: 43.4 %
PLATELET # BLD AUTO: 172 K/UL
POTASSIUM SERPL-SCNC: 4.9 MMOL/L
PROT SERPL-MCNC: 6.5 G/DL
PT BLD: 11.4 SEC
RBC # BLD: 3.89 M/UL
RBC # FLD: 15.1 %
SODIUM SERPL-SCNC: 140 MMOL/L
WBC # FLD AUTO: 9.25 K/UL

## 2020-12-13 NOTE — HISTORY OF PRESENT ILLNESS
[Preoperative Visit] : for a medical evaluation prior to surgery [Scheduled Procedure ___] : a [unfilled] [Date of Surgery ___] : on [unfilled] [Surgeon Name ___] : surgeon: [unfilled] [Renal Disease] : renal disease [Prior Anesthesia] : Prior anesthesia [Electrocardiogram] : ~T an ECG ~C was performed [Good] : Good [Moderate] : Stage: Moderate [Stable] : Status: Stable [None] : The patient is currently asymptomatic [0] : 2) Feeling down, depressed, or hopeless: Not at all [Fever] : no fever [Chills] : no chills [Fatigue] : no fatigue [Chest Pain] : no chest pain [Cough] : no cough [Dyspnea] : no dyspnea [Dysuria] : no dysuria [Urinary Frequency] : no urinary frequency [Nausea] : no nausea [Vomiting] : no vomiting [Diarrhea] : no diarrhea [Abdominal Pain] : no abdominal pain [Easy Bruising] : no easy bruising [Lower Extremity Swelling] : no lower extremity swelling [Poor Exercise Tolerance] : no poor exercise tolerance [Diabetes] : no diabetes [Pulmonary Disease] : no pulmonary disease [Anti-Platelet Agents] : no anti-platelet agents [Nicotine Dependence] : no nicotine dependence [Alcohol Use] : no  alcohol use [GI Disease] : no gastrointestinal disease [Sleep Apnea] : no sleep apnea [Thromboembolic Problems] : no thromboembolic problems [Frequent use of NSAIDs] : no use of NSAIDs [Transfusion Reaction] : no transfusion reaction [Impaired Immunity] : no impaired immunity [Steroid Use in Last 6 Months] : no steroid use in the last six months [Frequent Aspirin Use] : no frequent aspirin use [Prev Anesthesia Reaction] : no previous anesthesia reaction [Anesthesia Reaction] : no anesthesia reaction [Sudden Death] : no sudden death [Clotting Disorder] : no clotting disorder [Bleeding Disorder] : no bleeding disorder [de-identified] : as described in Hand P [de-identified] : Actually her cognition improved with nephrostomy drainage and antibiotic therapy - with pt pulling drain out she is at risk of more infections.   [FreeTextEntry1] : laughing with me, smiling

## 2020-12-13 NOTE — PHYSICAL EXAM
[General Appearance - Alert] : alert [General Appearance - In No Acute Distress] : in no acute distress [Sclera] : the sclera and conjunctiva were normal [PERRL With Normal Accommodation] : pupils were equal in size, round, and reactive to light [Extraocular Movements] : extraocular movements were intact [Neck Appearance] : the appearance of the neck was normal [Neck Cervical Mass (___cm)] : no neck mass was observed [Jugular Venous Distention Increased] : there was no jugular-venous distention [Thyroid Diffuse Enlargement] : the thyroid was not enlarged [Thyroid Nodule] : there were no palpable thyroid nodules [Auscultation Breath Sounds / Voice Sounds] : lungs were clear to auscultation bilaterally [Heart Rate And Rhythm] : heart rate was normal and rhythm regular [Heart Sounds] : normal S1 and S2 [Heart Sounds Gallop] : no gallops [Murmurs] : no murmurs [Heart Sounds Pericardial Friction Rub] : no pericardial rub [Full Pulse] : the pedal pulses are present [Edema] : there was no peripheral edema [Bowel Sounds] : normal bowel sounds [Abdomen Soft] : soft [Abdomen Tenderness] : non-tender [Abdomen Mass (___ Cm)] : no abdominal mass palpated [Cervical Lymph Nodes Enlarged Posterior Bilaterally] : posterior cervical [Cervical Lymph Nodes Enlarged Anterior Bilaterally] : anterior cervical [Supraclavicular Lymph Nodes Enlarged Bilaterally] : supraclavicular [Axillary Lymph Nodes Enlarged Bilaterally] : axillary [Femoral Lymph Nodes Enlarged Bilaterally] : femoral [Inguinal Lymph Nodes Enlarged Bilaterally] : inguinal [No CVA Tenderness] : no ~M costovertebral angle tenderness [No Spinal Tenderness] : no spinal tenderness [Abnormal Walk] : normal gait [Nail Clubbing] : no clubbing  or cyanosis of the fingernails [Musculoskeletal - Swelling] : no joint swelling seen [Motor Tone] : muscle strength and tone were normal [Skin Color & Pigmentation] : normal skin color and pigmentation [Skin Turgor] : normal skin turgor [] : no rash [Deep Tendon Reflexes (DTR)] : deep tendon reflexes were 2+ and symmetric [Sensation] : the sensory exam was normal to light touch and pinprick [No Focal Deficits] : no focal deficits [Affect] : the affect was normal [Normal Oral Mucosa] : normal oral mucosa [No Oral Pallor] : no oral pallor [No Oral Cyanosis] : no oral cyanosis [Outer Ear] : the ears and nose were normal in appearance [Oropharynx] : The oropharynx was normal [Over the Past 2 Weeks, Have You Felt Down, Depressed, or Hopeless?] : 1.) Over the past 2 weeks, have you felt down, depressed, or hopeless? No [Over the Past 2 Weeks, Have You Felt Little Interest or Pleasure Doing Things?] : 2.) Over the past 2 weeks, have you felt little interest or pleasure doing things? No [FreeTextEntry1] : pt pleasantly confused. Doesnt remember how many children of grandchildren she ahs but knows her son who is present

## 2020-12-13 NOTE — ASSESSMENT
[FreeTextEntry1] : 86 yo female with chronic pyelonephritis.\par \par Surgeon : Dr. Guerline Ramsey\par Date: 12/15/2020\par \par Labs, EKG and CXR reviewed. \par \par EKG done with me present - Results seen electronically and saved.  NSR. Saved with some artifact, but seen and without changes.  Compared to EKG from 3/3/2015 and April 2019. Went lead by lead and no changes. EKG with artifact bc pt does not stay still. \par \par Repeat EKG done at home and NSR.  compared with past EKGs, no change.\par \par Pt medically optimized for surgery, no high risk situation. Biggest challenge for pt will be delirium in hospital setting post-operatively.  Will need close following by our team ([facility] team). \par \par Will resend DNR and DNI for surgery and then replace postoperatively.\par Family aware and fully supportive. \par \par Dtr , Sandra Beauchamp [phone] is HCP and at times, she defers to son, Yoandy Kolb [phone]

## 2020-12-13 NOTE — HISTORY OF PRESENT ILLNESS
[Preoperative Visit] : for a medical evaluation prior to surgery [Scheduled Procedure ___] : a [unfilled] [Date of Surgery ___] : on [unfilled] [Surgeon Name ___] : surgeon: [unfilled] [Renal Disease] : renal disease [Prior Anesthesia] : Prior anesthesia [Electrocardiogram] : ~T an ECG ~C was performed [Good] : Good [Moderate] : Stage: Moderate [Stable] : Status: Stable [None] : The patient is currently asymptomatic [0] : 2) Feeling down, depressed, or hopeless: Not at all [Fever] : no fever [Chills] : no chills [Fatigue] : no fatigue [Chest Pain] : no chest pain [Cough] : no cough [Dyspnea] : no dyspnea [Dysuria] : no dysuria [Urinary Frequency] : no urinary frequency [Nausea] : no nausea [Vomiting] : no vomiting [Diarrhea] : no diarrhea [Abdominal Pain] : no abdominal pain [Easy Bruising] : no easy bruising [Lower Extremity Swelling] : no lower extremity swelling [Poor Exercise Tolerance] : no poor exercise tolerance [Diabetes] : no diabetes [Pulmonary Disease] : no pulmonary disease [Anti-Platelet Agents] : no anti-platelet agents [Nicotine Dependence] : no nicotine dependence [Alcohol Use] : no  alcohol use [GI Disease] : no gastrointestinal disease [Sleep Apnea] : no sleep apnea [Thromboembolic Problems] : no thromboembolic problems [Frequent use of NSAIDs] : no use of NSAIDs [Transfusion Reaction] : no transfusion reaction [Impaired Immunity] : no impaired immunity [Steroid Use in Last 6 Months] : no steroid use in the last six months [Frequent Aspirin Use] : no frequent aspirin use [Prev Anesthesia Reaction] : no previous anesthesia reaction [Anesthesia Reaction] : no anesthesia reaction [Sudden Death] : no sudden death [Clotting Disorder] : no clotting disorder [Bleeding Disorder] : no bleeding disorder [de-identified] : as described in Hand P [de-identified] : Actually her cognition improved with nephrostomy drainage and antibiotic therapy - with pt pulling drain out she is at risk of more infections.   [FreeTextEntry1] : laughing with me, smiling

## 2020-12-13 NOTE — ASSESSMENT
[FreeTextEntry1] : 86 yo female with chronic pyelonephritis.\par \par Surgeon : Dr. Guerline Ramsey\par Date: 12/15/2020\par \par Labs, EKG and CXR reviewed. \par \par EKG done with me present - Results seen electronically and saved.  NSR. Saved with some artifact, but seen and without changes.  Compared to EKG from 3/3/2015 and April 2019. Went lead by lead and no changes. EKG with artifact bc pt does not stay still. \par \par Repeat EKG done at home and NSR.  compared with past EKGs, no change.\par \par Pt medically optimized for surgery, no high risk situation. Biggest challenge for pt will be delirium in hospital setting post-operatively.  Will need close following by our team (GaP team). \par \par Will resend DNR and DNI for surgery and then replace postoperatively.\par Family aware and fully supportive. \par \par Dtr , Sandra Beauchamp [phone] is HCP and at times, she defers to son, Yoandy Kolb [phone]

## 2020-12-13 NOTE — SOCIAL HISTORY
[No falls in past year] : Patient reported no falls in the past year [Independent] : feeding [Some assistance needed] : dressing [Full assistance needed] : managing finances [FreeTextEntry1] : son and daughter in law [FreeTextEntry2] : good [FreeTextEntry4] : very good

## 2020-12-14 ENCOUNTER — TRANSCRIPTION ENCOUNTER (OUTPATIENT)
Age: 85
End: 2020-12-14

## 2020-12-15 ENCOUNTER — APPOINTMENT (OUTPATIENT)
Dept: UROLOGY | Facility: HOSPITAL | Age: 85
End: 2020-12-15

## 2020-12-15 ENCOUNTER — INPATIENT (INPATIENT)
Facility: HOSPITAL | Age: 85
LOS: 0 days | Discharge: HOME | End: 2020-12-16
Attending: UROLOGY | Admitting: UROLOGY
Payer: MEDICARE

## 2020-12-15 ENCOUNTER — RESULT REVIEW (OUTPATIENT)
Age: 85
End: 2020-12-15

## 2020-12-15 VITALS
OXYGEN SATURATION: 96 % | TEMPERATURE: 98 F | SYSTOLIC BLOOD PRESSURE: 110 MMHG | HEART RATE: 81 BPM | DIASTOLIC BLOOD PRESSURE: 53 MMHG | RESPIRATION RATE: 17 BRPM

## 2020-12-15 DIAGNOSIS — N39.0 URINARY TRACT INFECTION, SITE NOT SPECIFIED: ICD-10-CM

## 2020-12-15 DIAGNOSIS — Z98.890 OTHER SPECIFIED POSTPROCEDURAL STATES: Chronic | ICD-10-CM

## 2020-12-15 LAB
ANION GAP SERPL CALC-SCNC: 11 MMOL/L — SIGNIFICANT CHANGE UP (ref 7–14)
BUN SERPL-MCNC: 19 MG/DL — SIGNIFICANT CHANGE UP (ref 7–23)
CALCIUM SERPL-MCNC: 8.8 MG/DL — SIGNIFICANT CHANGE UP (ref 8.4–10.5)
CHLORIDE SERPL-SCNC: 104 MMOL/L — SIGNIFICANT CHANGE UP (ref 98–107)
CO2 SERPL-SCNC: 21 MMOL/L — LOW (ref 22–31)
CREAT SERPL-MCNC: 0.9 MG/DL — SIGNIFICANT CHANGE UP (ref 0.5–1.3)
GLUCOSE SERPL-MCNC: 191 MG/DL — HIGH (ref 70–99)
HCT VFR BLD CALC: 31 % — LOW (ref 34.5–45)
HGB BLD-MCNC: 9.4 G/DL — LOW (ref 11.5–15.5)
MCHC RBC-ENTMCNC: 29.9 PG — SIGNIFICANT CHANGE UP (ref 27–34)
MCHC RBC-ENTMCNC: 30.3 GM/DL — LOW (ref 32–36)
MCV RBC AUTO: 98.7 FL — SIGNIFICANT CHANGE UP (ref 80–100)
NRBC # BLD: 0 /100 WBCS — SIGNIFICANT CHANGE UP
NRBC # FLD: 0 K/UL — SIGNIFICANT CHANGE UP
PLATELET # BLD AUTO: 187 K/UL — SIGNIFICANT CHANGE UP (ref 150–400)
POTASSIUM SERPL-MCNC: 4 MMOL/L — SIGNIFICANT CHANGE UP (ref 3.5–5.3)
POTASSIUM SERPL-SCNC: 4 MMOL/L — SIGNIFICANT CHANGE UP (ref 3.5–5.3)
RBC # BLD: 3.14 M/UL — LOW (ref 3.8–5.2)
RBC # FLD: 15 % — HIGH (ref 10.3–14.5)
SODIUM SERPL-SCNC: 136 MMOL/L — SIGNIFICANT CHANGE UP (ref 135–145)
WBC # BLD: 9.27 K/UL — SIGNIFICANT CHANGE UP (ref 3.8–10.5)
WBC # FLD AUTO: 9.27 K/UL — SIGNIFICANT CHANGE UP (ref 3.8–10.5)

## 2020-12-15 PROCEDURE — 88307 TISSUE EXAM BY PATHOLOGIST: CPT | Mod: 26

## 2020-12-15 PROCEDURE — 88312 SPECIAL STAINS GROUP 1: CPT | Mod: 26

## 2020-12-15 PROCEDURE — 50546 LAPAROSCOPIC NEPHRECTOMY: CPT | Mod: RT

## 2020-12-15 PROCEDURE — 99223 1ST HOSP IP/OBS HIGH 75: CPT

## 2020-12-15 RX ORDER — KETOROLAC TROMETHAMINE 30 MG/ML
15 SYRINGE (ML) INJECTION EVERY 8 HOURS
Refills: 0 | Status: DISCONTINUED | OUTPATIENT
Start: 2020-12-15 | End: 2020-12-16

## 2020-12-15 RX ORDER — ACETAMINOPHEN 500 MG
650 TABLET ORAL EVERY 6 HOURS
Refills: 0 | Status: DISCONTINUED | OUTPATIENT
Start: 2020-12-15 | End: 2020-12-16

## 2020-12-15 RX ORDER — DONEPEZIL HYDROCHLORIDE 10 MG/1
10 TABLET, FILM COATED ORAL AT BEDTIME
Refills: 0 | Status: DISCONTINUED | OUTPATIENT
Start: 2020-12-15 | End: 2020-12-16

## 2020-12-15 RX ORDER — AMLODIPINE BESYLATE 2.5 MG/1
1 TABLET ORAL
Qty: 0 | Refills: 0 | DISCHARGE

## 2020-12-15 RX ORDER — FERROUS SULFATE 325(65) MG
325 TABLET ORAL DAILY
Refills: 0 | Status: DISCONTINUED | OUTPATIENT
Start: 2020-12-15 | End: 2020-12-16

## 2020-12-15 RX ORDER — DONEPEZIL HYDROCHLORIDE 10 MG/1
1 TABLET, FILM COATED ORAL
Qty: 0 | Refills: 0 | DISCHARGE

## 2020-12-15 RX ORDER — AMLODIPINE BESYLATE 2.5 MG/1
10 TABLET ORAL DAILY
Refills: 0 | Status: DISCONTINUED | OUTPATIENT
Start: 2020-12-15 | End: 2020-12-16

## 2020-12-15 RX ORDER — HEPARIN SODIUM 5000 [USP'U]/ML
5000 INJECTION INTRAVENOUS; SUBCUTANEOUS EVERY 8 HOURS
Refills: 0 | Status: DISCONTINUED | OUTPATIENT
Start: 2020-12-15 | End: 2020-12-16

## 2020-12-15 RX ORDER — ESCITALOPRAM OXALATE 10 MG/1
1 TABLET, FILM COATED ORAL
Qty: 0 | Refills: 0 | DISCHARGE

## 2020-12-15 RX ORDER — SODIUM CHLORIDE 9 MG/ML
1000 INJECTION, SOLUTION INTRAVENOUS
Refills: 0 | Status: DISCONTINUED | OUTPATIENT
Start: 2020-12-15 | End: 2020-12-16

## 2020-12-15 RX ORDER — ESCITALOPRAM OXALATE 10 MG/1
5 TABLET, FILM COATED ORAL DAILY
Refills: 0 | Status: DISCONTINUED | OUTPATIENT
Start: 2020-12-15 | End: 2020-12-16

## 2020-12-15 RX ADMIN — Medication 650 MILLIGRAM(S): at 19:15

## 2020-12-15 RX ADMIN — Medication 650 MILLIGRAM(S): at 20:15

## 2020-12-15 RX ADMIN — SODIUM CHLORIDE 100 MILLILITER(S): 9 INJECTION, SOLUTION INTRAVENOUS at 19:09

## 2020-12-15 RX ADMIN — HEPARIN SODIUM 5000 UNIT(S): 5000 INJECTION INTRAVENOUS; SUBCUTANEOUS at 21:56

## 2020-12-15 RX ADMIN — Medication 1 TABLET(S): at 19:08

## 2020-12-15 RX ADMIN — DONEPEZIL HYDROCHLORIDE 10 MILLIGRAM(S): 10 TABLET, FILM COATED ORAL at 21:56

## 2020-12-15 RX ADMIN — HEPARIN SODIUM 5000 UNIT(S): 5000 INJECTION INTRAVENOUS; SUBCUTANEOUS at 16:07

## 2020-12-15 NOTE — BRIEF OPERATIVE NOTE - NSICDXBRIEFPREOP_GEN_ALL_CORE_FT
PRE-OP DIAGNOSIS:  Chronic pyelonephritis 15-Dec-2020 14:33:52  Catrina Naik  Atrophic kidney 15-Dec-2020 14:33:38  Catrina Naik

## 2020-12-15 NOTE — CONSULT NOTE ADULT - SUBJECTIVE AND OBJECTIVE BOX
INCOMPLETE    CHIEF COMPLAINT: Patient is a 87y old  Female who presents with a chief complaint of     HPI: HPI:  87-year-old female with HTN, advanced dementia, recent hospitalization at [facility] found to have L pubic ramus fracture, R sacral fracture, and R UPJ dilation, recent UTI, s/p Nephrostomy tube placement. Pt has pulled it and required replacement due to dementia. Plan for simple nephrectomy for non functioning kidney.    (15 Dec 2020 08:11)      Pain Symptoms if applicable:             	                           none	    mild         moderate         severe  	                            0	     1-3	      4-6	          7-10  Pain:  Location:	  Modifying factors:	  Associated symptoms:	    Allergies    No Known Allergies    Intolerances        HOME MEDICATIONS: [x] Reviewed    MEDICATIONS  (STANDING):  amLODIPine   Tablet 10 milliGRAM(s) Oral daily  donepezil 10 milliGRAM(s) Oral at bedtime  escitalopram 5 milliGRAM(s) Oral daily  ferrous    sulfate 325 milliGRAM(s) Oral daily  heparin   Injectable 5000 Unit(s) SubCutaneous every 8 hours  lactated ringers. 1000 milliLiter(s) (100 mL/Hr) IV Continuous <Continuous>    MEDICATIONS  (PRN):  acetaminophen   Tablet .. 650 milliGRAM(s) Oral every 6 hours PRN Mild Pain (1 - 3), Moderate Pain (4 - 6)      PAST MEDICAL & SURGICAL HISTORY:  Dementia    HTN (hypertension)    History of hip surgery    [ x] Reviewed     SOCIAL HISTORY:  [x] Substance abuse: caffeine   [x] Tobacco: former smoker  [x] Alcohol use: none     FAMILY HISTORY:  FH: dementia    [x] No pertinent family history in first degree relatives     REVIEW OF SYSTEMS:    [x] All other ROS negative  [  ] Unable to obtain due to poor mental status    Vital Signs Last 24 Hrs  T(C): 36.4 (15 Dec 2020 07:22), Max: 36.4 (15 Dec 2020 06:58)  T(F): 97.6 (15 Dec 2020 07:22), Max: 97.6 (15 Dec 2020 06:58)  HR: 81 (15 Dec 2020 07:22) (81 - 81)  BP: 110/53 (15 Dec 2020 07:22) (110/53 - 110/53)  BP(mean): --  RR: 17 (15 Dec 2020 07:22) (17 - 17)  SpO2: 96% (15 Dec 2020 07:22) (96% - 96%)    PHYSICAL EXAM:    GENERAL: NAD, well-groomed, well-developed  HEAD:  Atraumatic, Normocephalic  EYES: EOMI, PERRLA, conjunctiva and sclera clear  ENMT: Moist mucous membranes  NECK: Supple, No JVD  RESPIRATORY: Clear to auscultation bilaterally; No rales, rhonchi, wheezing, or rubs  CARDIOVASCULAR: Regular rate and rhythm; No murmurs, rubs, or gallops  GASTROINTESTINAL: Soft, Nontender, Nondistended; Bowel sounds present  GENITOURINARY: Not examined  EXTREMITIES:  2+ Peripheral Pulses, No clubbing, cyanosis, or edema  NERVOUS SYSTEM:  Alert & Oriented X3; Moving all 4 extremities; No gross sensory deficits  HEME/LYMPH: No lymphadenopathy noted  SKIN: No rashes or lesions; Incisions C/D/I    LABS:              CAPILLARY BLOOD GLUCOSE          RADIOLOGY & ADDITIONAL STUDIES:    EKG:   Personally Reviewed:  [x] YES     Imaging:   Personally Reviewed:  [x] YES               [ ] Consultant(s) Notes Reviewed  [x] Care Discussed with Consultants/Other Providers: Urology PA - discussed INCOMPLETE    CHIEF COMPLAINT: Patient is a 87y old  Female who presents with a chief complaint of     HPI: HPI:  87-year-old female with HTN, advanced dementia, recent hospitalization at [place] found to have L pubic ramus fracture, R sacral fracture, and R UPJ dilation, recent UTI, s/p Nephrostomy tube placement. Pt has pulled it and required replacement due to dementia. Plan for simple nephrectomy for non functioning kidney.    (15 Dec 2020 08:11)      Pain Symptoms if applicable:  unable to assess d/t dementia     Allergies    No Known Allergies    Intolerances        HOME MEDICATIONS: [x] Reviewed    MEDICATIONS  (STANDING):  amLODIPine   Tablet 10 milliGRAM(s) Oral daily  donepezil 10 milliGRAM(s) Oral at bedtime  escitalopram 5 milliGRAM(s) Oral daily  ferrous    sulfate 325 milliGRAM(s) Oral daily  heparin   Injectable 5000 Unit(s) SubCutaneous every 8 hours  lactated ringers. 1000 milliLiter(s) (100 mL/Hr) IV Continuous <Continuous>    MEDICATIONS  (PRN):  acetaminophen   Tablet .. 650 milliGRAM(s) Oral every 6 hours PRN Mild Pain (1 - 3), Moderate Pain (4 - 6)      PAST MEDICAL & SURGICAL HISTORY:  Dementia    HTN (hypertension)    History of hip surgery    [ x] Reviewed     SOCIAL HISTORY:  [x] Substance abuse: caffeine   [x] Tobacco: former smoker  [x] Alcohol use: none     FAMILY HISTORY:  FH: dementia    [x] No pertinent family history in first degree relatives     REVIEW OF SYSTEMS:    [x] All other ROS negative  [  ] Unable to obtain due to poor mental status    Vital Signs Last 24 Hrs  T(C): 36.4 (15 Dec 2020 07:22), Max: 36.4 (15 Dec 2020 06:58)  T(F): 97.6 (15 Dec 2020 07:22), Max: 97.6 (15 Dec 2020 06:58)  HR: 81 (15 Dec 2020 07:22) (81 - 81)  BP: 110/53 (15 Dec 2020 07:22) (110/53 - 110/53)  BP(mean): --  RR: 17 (15 Dec 2020 07:22) (17 - 17)  SpO2: 96% (15 Dec 2020 07:22) (96% - 96%)    PHYSICAL EXAM:    GENERAL: NAD, elderly, sleepy but arousable   HEAD:  Atraumatic, Normocephalic  EYES: EOMI, conjunctiva and sclera clear  ENMT: Moist mucous membranes  NECK: Supple  RESPIRATORY: CTAB anteriorly, poor inspiratory effort.  CARDIOVASCULAR: distant heart sounds, no murmurs   GASTROINTESTINAL: Soft, Nontender  GENITOURINARY: +martinez   EXTREMITIES:  wwp, +SCDs, +pedal edema   NERVOUS SYSTEM: Moving all 4 extremities; No gross sensory deficits  SKIN: No rashes or lesions;   LABS:              CAPILLARY BLOOD GLUCOSE          RADIOLOGY & ADDITIONAL STUDIES:    EKG:   Personally Reviewed:  [x] YES     Imaging:   Personally Reviewed:  [x] YES               [ ] Consultant(s) Notes Reviewed  [x] Care Discussed with Consultants/Other Providers: Urology PA - discussed CHIEF COMPLAINT: Patient is a 87y old  Female who presents with a chief complaint of     HPI: HPI:  87-year-old female with HTN, advanced dementia, recent hospitalization at [place] found to have L pubic ramus fracture, R sacral fracture, and R UPJ dilation, recent UTI, s/p Nephrostomy tube placement. Pt has pulled it and required replacement due to dementia. Plan for simple nephrectomy for non functioning kidney.    (15 Dec 2020 08:11)    Pt seen in PACU, per RN at bedside, pt initially hypoxic, however O2 improved on 4L NC and elevating HOB.   Also agitated upon arrival to PACU, but currently calm/resting.   Cannot state if in any pain.     Pain Symptoms if applicable:  unable to assess d/t dementia     Allergies    No Known Allergies    Intolerances        HOME MEDICATIONS: [x] Reviewed    MEDICATIONS  (STANDING):  amLODIPine   Tablet 10 milliGRAM(s) Oral daily  donepezil 10 milliGRAM(s) Oral at bedtime  escitalopram 5 milliGRAM(s) Oral daily  ferrous    sulfate 325 milliGRAM(s) Oral daily  heparin   Injectable 5000 Unit(s) SubCutaneous every 8 hours  lactated ringers. 1000 milliLiter(s) (100 mL/Hr) IV Continuous <Continuous>    MEDICATIONS  (PRN):  acetaminophen   Tablet .. 650 milliGRAM(s) Oral every 6 hours PRN Mild Pain (1 - 3), Moderate Pain (4 - 6)      PAST MEDICAL & SURGICAL HISTORY:  Dementia    HTN (hypertension)    History of hip surgery    [ x] Reviewed     SOCIAL HISTORY:  [x] Substance abuse: caffeine   [x] Tobacco: former smoker  [x] Alcohol use: none     FAMILY HISTORY:  FH: dementia    [x] No pertinent family history in first degree relatives     REVIEW OF SYSTEMS:    [x] All other ROS negative  [  ] Unable to obtain due to poor mental status    Vital Signs Last 24 Hrs  T(C): 36.4 (15 Dec 2020 07:22), Max: 36.4 (15 Dec 2020 06:58)  T(F): 97.6 (15 Dec 2020 07:22), Max: 97.6 (15 Dec 2020 06:58)  HR: 81 (15 Dec 2020 07:22) (81 - 81)  BP: 110/53 (15 Dec 2020 07:22) (110/53 - 110/53)  BP(mean): --  RR: 17 (15 Dec 2020 07:22) (17 - 17)  SpO2: 96% (15 Dec 2020 07:22) (96% - 96%)    PHYSICAL EXAM:    GENERAL: NAD, elderly, sleepy but arousable   HEAD:  Atraumatic, Normocephalic  EYES: EOMI, conjunctiva and sclera clear  ENMT: Moist mucous membranes  NECK: Supple  RESPIRATORY: CTAB anteriorly, poor inspiratory effort.  CARDIOVASCULAR: distant heart sounds, no murmurs   GASTROINTESTINAL: Soft, Nontender  GENITOURINARY: +martinez   EXTREMITIES:  wwp, +SCDs, +pedal edema   NERVOUS SYSTEM: Moving all 4 extremities; No gross sensory deficits  SKIN: No rashes or lesions;   LABS:              CAPILLARY BLOOD GLUCOSE          RADIOLOGY & ADDITIONAL STUDIES:    EKG:   Personally Reviewed:  [x] YES     Imaging:   Personally Reviewed:  [x] YES               [ ] Consultant(s) Notes Reviewed  [x] Care Discussed with Consultants/Other Providers: Urology PA - discussed

## 2020-12-15 NOTE — H&P ADULT - PROBLEM SELECTOR PLAN 3
-Pt DNR/DNI, from home hospice, [facility]. MOLST in chart  -Plan for Nephrostomy tube adjustment by either IR or Urology  -HCP: Leyda, Form in chart

## 2020-12-15 NOTE — H&P ADULT - ASSESSMENT
87-year-old woman with HTN, advanced dementia with R non functioning kidney. Presents for simple nephrectomy.

## 2020-12-15 NOTE — CONSULT NOTE ADULT - ASSESSMENT
87F w/dementia, HTN, recent hospitalization at [facility] found to have L pubic ramus fracture, R sacral fracture, and R UPJ dilation, recent UTI, s/p Nephrostomy tube placement c/b dislodgement, admitted for R lap nephrectomy for nonfunctioning kidney.     #Nonfunctioning kidney: s/p R lap nephrectomy   -Pain control   -Management per primary team     #Dementia: c/w aricept   #HTN: Monitor BP, c/w home meds  #GOC: DNR/DNI, home hospice    87F w/dementia, HTN, recent hospitalization at [facility] found to have L pubic ramus fracture, R sacral fracture, and R UPJ dilation, recent UTI, s/p Nephrostomy tube placement c/b dislodgement, admitted for R lap nephrectomy for nonfunctioning kidney.     #Nonfunctioning kidney: s/p R lap nephrectomy   -Pain control   -Management per primary team     #Dementia: c/w aricept, Lexapro     #HTN: Monitor BP, c/w home meds    #GOC: DNR/DNI, home hospice    87F w/dementia, HTN, recent hospitalization at [facility] found to have L pubic ramus fracture, R sacral fracture, and R UPJ dilation, recent UTI, s/p Nephrostomy tube placement c/b dislodgement, admitted for R lap nephrectomy for nonfunctioning kidney.     #Nonfunctioning kidney: s/p R lap nephrectomy, EBL 300mL, received amp & gent intraop  -Bactrim ppx x1 day   -Roth per primary team   -Pain control   -Management per primary team     #Dementia: c/w aricept, Lexapro     #HTN: Monitor BP, c/w home meds    #GOC: DNR/DNI, home hospice

## 2020-12-15 NOTE — PROGRESS NOTE ADULT - SUBJECTIVE AND OBJECTIVE BOX
Post op check    This 88 yo F is s/p  R. lap simple neph  PMH:  Dementia; HTN  Pt is awake and alert  Has no c/o  Afeb 101/50 90 95%RA  Abd- soft; appropriately tender             wounds C&D  Roth 200                        9.4    9.27  )-----------( 187      ( 15 Dec 2020 14:31 )             31.0   12-15    136  |  104  |  19  ----------------------------<  191<H>  4.0   |  21<L>  |  0.90    Ca    8.8      15 Dec 2020 14:31

## 2020-12-15 NOTE — H&P ADULT - HISTORY OF PRESENT ILLNESS
87-year-old female with HTN, advanced dementia, recent hospitalization at [facility] found to have L pubic ramus fracture, R sacral fracture, and R UPJ dilation, recent UTI, s/p Nephrostomy tube placement. Pt has pulled it and required replacement due to dementia. Plan for simple nephrectomy for non functioning kidney.

## 2020-12-15 NOTE — H&P ADULT - NSHPPHYSICALEXAM_GEN_ALL_CORE
ICU Vital Signs Last 24 Hrs  T(C): 36.5 (08 Nov 2020 12:30), Max: 36.5 (08 Nov 2020 12:30)  T(F): 97.7 (08 Nov 2020 12:30), Max: 97.7 (08 Nov 2020 12:30)  HR: 75 (08 Nov 2020 12:30) (75 - 75)  BP: 128/67 (08 Nov 2020 12:30) (128/67 - 128/67)  BP(mean): --  ABP: --  ABP(mean): --  RR: 18 (08 Nov 2020 12:30) (18 - 18)  SpO2: --    PHYSICAL EXAM:  GENERAL: NAD, well-developed  HEAD:  Atraumatic, Normocephalic  EYES: EOMI, PERRLA, conjunctiva and sclera clear  ENMT: No tonsillar erythema, exudates, or enlargement; Moist mucous membranes  NECK: Supple, No JVD,  CHEST/LUNG: Clear to ausculation bilaterally; No rales, rhonchi, wheezing, or rubs  HEART: Regular rate and rhythm; No murmurs, rubs, or gallops  ABDOMEN: Soft, Nontender, Nondistended; Bowel sounds present  EXTREMITIES:  2+ Peripheral Pulses, No clubbing, cyanosis, or edema  LYMPH: No lymphadenopathy noted  SKIN: No rashes or lesions  NERVOUS SYSTEM:  Alert & Oriented X1 to name

## 2020-12-15 NOTE — BRIEF OPERATIVE NOTE - NSICDXBRIEFPOSTOP_GEN_ALL_CORE_FT
POST-OP DIAGNOSIS:  Chronic pyelonephritis 15-Dec-2020 14:47:58  Catrina Naik  Atrophic kidney 15-Dec-2020 14:47:37  Catrina Naik

## 2020-12-16 ENCOUNTER — TRANSCRIPTION ENCOUNTER (OUTPATIENT)
Age: 85
End: 2020-12-16

## 2020-12-16 VITALS
OXYGEN SATURATION: 96 % | HEART RATE: 94 BPM | TEMPERATURE: 98 F | DIASTOLIC BLOOD PRESSURE: 51 MMHG | RESPIRATION RATE: 18 BRPM | SYSTOLIC BLOOD PRESSURE: 112 MMHG

## 2020-12-16 LAB
ANION GAP SERPL CALC-SCNC: 8 MMOL/L — SIGNIFICANT CHANGE UP (ref 7–14)
BUN SERPL-MCNC: 14 MG/DL — SIGNIFICANT CHANGE UP (ref 7–23)
CALCIUM SERPL-MCNC: 8.9 MG/DL — SIGNIFICANT CHANGE UP (ref 8.4–10.5)
CHLORIDE SERPL-SCNC: 104 MMOL/L — SIGNIFICANT CHANGE UP (ref 98–107)
CO2 SERPL-SCNC: 24 MMOL/L — SIGNIFICANT CHANGE UP (ref 22–31)
CREAT SERPL-MCNC: 0.9 MG/DL — SIGNIFICANT CHANGE UP (ref 0.5–1.3)
GLUCOSE SERPL-MCNC: 96 MG/DL — SIGNIFICANT CHANGE UP (ref 70–99)
HCT VFR BLD CALC: 28 % — LOW (ref 34.5–45)
HGB BLD-MCNC: 8.8 G/DL — LOW (ref 11.5–15.5)
MAGNESIUM SERPL-MCNC: 1.8 MG/DL — SIGNIFICANT CHANGE UP (ref 1.6–2.6)
MCHC RBC-ENTMCNC: 30.3 PG — SIGNIFICANT CHANGE UP (ref 27–34)
MCHC RBC-ENTMCNC: 31.4 GM/DL — LOW (ref 32–36)
MCV RBC AUTO: 96.6 FL — SIGNIFICANT CHANGE UP (ref 80–100)
NRBC # BLD: 0 /100 WBCS — SIGNIFICANT CHANGE UP
NRBC # FLD: 0 K/UL — SIGNIFICANT CHANGE UP
PHOSPHATE SERPL-MCNC: 2.8 MG/DL — SIGNIFICANT CHANGE UP (ref 2.5–4.5)
PLATELET # BLD AUTO: 178 K/UL — SIGNIFICANT CHANGE UP (ref 150–400)
POTASSIUM SERPL-MCNC: 4.4 MMOL/L — SIGNIFICANT CHANGE UP (ref 3.5–5.3)
POTASSIUM SERPL-SCNC: 4.4 MMOL/L — SIGNIFICANT CHANGE UP (ref 3.5–5.3)
RBC # BLD: 2.9 M/UL — LOW (ref 3.8–5.2)
RBC # FLD: 15.1 % — HIGH (ref 10.3–14.5)
SODIUM SERPL-SCNC: 136 MMOL/L — SIGNIFICANT CHANGE UP (ref 135–145)
WBC # BLD: 8.34 K/UL — SIGNIFICANT CHANGE UP (ref 3.8–10.5)
WBC # FLD AUTO: 8.34 K/UL — SIGNIFICANT CHANGE UP (ref 3.8–10.5)

## 2020-12-16 PROCEDURE — 99233 SBSQ HOSP IP/OBS HIGH 50: CPT

## 2020-12-16 RX ORDER — ALPRAZOLAM 0.25 MG
0.25 TABLET ORAL ONCE
Refills: 0 | Status: DISCONTINUED | OUTPATIENT
Start: 2020-12-16 | End: 2020-12-16

## 2020-12-16 RX ADMIN — HEPARIN SODIUM 5000 UNIT(S): 5000 INJECTION INTRAVENOUS; SUBCUTANEOUS at 06:23

## 2020-12-16 RX ADMIN — Medication 650 MILLIGRAM(S): at 08:40

## 2020-12-16 RX ADMIN — Medication 0.25 MILLIGRAM(S): at 02:28

## 2020-12-16 RX ADMIN — Medication 1 TABLET(S): at 06:23

## 2020-12-16 NOTE — DISCHARGE NOTE NURSING/CASE MANAGEMENT/SOCIAL WORK - CAREGIVER ADDRESS
F: [address] [facility] [id number], B: [address] [facility] [id number]\

## 2020-12-16 NOTE — PROGRESS NOTE ADULT - ATTENDING COMMENTS
Pt seen and examined. Agree with above and edited as appropriate. No issues with pain. Needs ambulation and IS and if O2 improved, d/c home.

## 2020-12-16 NOTE — PROGRESS NOTE ADULT - ASSESSMENT
87F w/dementia, HTN, recent hospitalization at [facility] found to have L pubic ramus fracture, R sacral fracture, and R UPJ dilation, recent UTI, s/p Nephrostomy tube placement c/b dislodgement, admitted for R lap nephrectomy for nonfunctioning kidney.     #Hypoxia: post-op likely 2/2 anesthesia, briefly on NC  -currently saturating >92% on room air     #Nonfunctioning kidney: s/p R lap nephrectomy, EBL 300mL, received amp & gent intraop  -s/p Bactrim ppx  -Passed TOV   -Management per primary team     #Dementia: c/w aricept, Lexapro     #HTN: Monitor BP, c/w home meds    #GOC: DNR/DNI, home hospice

## 2020-12-16 NOTE — PROGRESS NOTE ADULT - ASSESSMENT
stable s/p R. lap simple neph  POD#1 - no events; daughter stayed with pt  Plan:  - AM labs  - d/c juan  - poss home later

## 2020-12-16 NOTE — PROGRESS NOTE ADULT - SUBJECTIVE AND OBJECTIVE BOX
CHIEF COMPLAINT: f/u     SUBJECTIVE / OVERNIGHT EVENTS: Patient seen and examined. No acute events overnight. Pain well controlled and patient without any complaints.  Pt sitting in chair, alert and in good mood, denies any complaints.   NC out of nares, saturating 92% on room air     MEDICATIONS  (STANDING):  amLODIPine   Tablet 10 milliGRAM(s) Oral daily  donepezil 10 milliGRAM(s) Oral at bedtime  escitalopram 5 milliGRAM(s) Oral daily  ferrous    sulfate 325 milliGRAM(s) Oral daily  heparin   Injectable 5000 Unit(s) SubCutaneous every 8 hours    MEDICATIONS  (PRN):  acetaminophen   Tablet .. 650 milliGRAM(s) Oral every 6 hours PRN Mild Pain (1 - 3), Moderate Pain (4 - 6)  ketorolac   Injectable 15 milliGRAM(s) IV Push every 8 hours PRN Severe Pain (7 - 10)      VITALS:  T(F): 97.6 (12-16-20 @ 09:25), Max: 98.6 (12-15-20 @ 21:53)  HR: 94 (12-16-20 @ 09:25) (86 - 97)  BP: 112/51 (12-16-20 @ 09:25) (94/61 - 116/62)  RR: 18 (12-16-20 @ 09:25) (12 - 22)  SpO2: 96% (12-16-20 @ 09:25)  Wt(kg): --      CAPILLARY BLOOD GLUCOSE      PHYSICAL EXAM:  GENERAL: NAD, sitting in chair  EYES: EOMI, conjunctiva and sclera clear  ENMT: Moist mucous membranes  NECK: Supple  RESPIRATORY: mild bibasilar crackles, no resp distress or wheeze  CARDIOVASCULAR: RRR, S1/S2  GASTROINTESTINAL: Soft, Nontender  GENITOURINARY: no martinez   EXTREMITIES:  wwp, =mild pedal edema   NERVOUS SYSTEM: Moving all 4 extremities; No gross sensory deficits  SKIN: No rashes or lesions;     LABS:              8.8                  136  | 24   | 14           8.34  >-----------< 178     ------------------------< 96                    28.0                 4.4  | 104  | 0.90                                         Ca 8.9   Mg 1.8   Ph 2.8                        MICROBIOLOGY:        RADIOLOGY & ADDITIONAL TESTS:  Imaging Personally Reviewed: [ ] Yes    [ ] Consultant(s) Notes Reviewed:  [x] Care Discussed with Consultants/Other Providers: Urology PA - discussed

## 2020-12-16 NOTE — DISCHARGE NOTE NURSING/CASE MANAGEMENT/SOCIAL WORK - NSDCFUADDAPPT_GEN_ALL_CORE_FT
Follow-up with Dr. Ramsey in the office as instructed for post-op check as well as with PMD for continuity of care.

## 2020-12-16 NOTE — DISCHARGE NOTE NURSING/CASE MANAGEMENT/SOCIAL WORK - PATIENT PORTAL LINK FT
You can access the FollowMyHealth Patient Portal offered by [facility] by registering at the following website: [email or web address]. By joining [facility]’s FollowMyHealth portal, you will also be able to view your health information using other applications (apps) compatible with our system.

## 2020-12-16 NOTE — DISCHARGE NOTE NURSING/CASE MANAGEMENT/SOCIAL WORK - NSDCPECAREGIVERED_GEN_ALL_CORE
Medline and carenotes for surgical procedure nephrectomy as well as DC Medications and side effects literature for patient reference./Yes

## 2020-12-16 NOTE — DISCHARGE NOTE PROVIDER - NSDCCPCAREPLAN_GEN_ALL_CORE_FT
PRINCIPAL DISCHARGE DIAGNOSIS  Diagnosis: S/p nephrectomy  Assessment and Plan of Treatment: Drink plenty of fluids; call for fever over 101; call office for f/u appt

## 2020-12-16 NOTE — DISCHARGE NOTE PROVIDER - NSDCMRMEDTOKEN_GEN_ALL_CORE_FT
acetaminophen 325 mg oral tablet: 3 tab(s) orally every 8 hours  amLODIPine 10 mg oral tablet: 1 tab(s) orally once a day  donepezil 10 mg oral tablet: 1 tab(s) orally once a day (at bedtime)  ferrous sulfate 325 mg (65 mg elemental iron) oral tablet: 1 tab(s) orally once a day  Lexapro 5 mg oral tablet: 1 tab(s) orally once a day

## 2020-12-16 NOTE — DISCHARGE NOTE PROVIDER - HOSPITAL COURSE
Pt had lap R. simple neph yesterday; demented with daughter staying overnite; did well; martinez out this AM; voided well; labs stable; will self advance at home (daughter is in charge); f/u in office

## 2020-12-16 NOTE — PROGRESS NOTE ADULT - SUBJECTIVE AND OBJECTIVE BOX
POD #1  Afeb 109/59 90 95% 2L  Pt has no c/o; daughter at bedside  Abd- soft NT ND; ? flatus (pt not able to tell us)    wounds C&D  Roth 350

## 2020-12-16 NOTE — DISCHARGE NOTE NURSING/CASE MANAGEMENT/SOCIAL WORK - NSDCPNINST_GEN_ALL_CORE
Maintain abdominal incisions clean dry and intact, steri strips will fall off on their own in 1-2 weeks. Call MD with any signs of infection ie fever/shaking chills, redness or drainage, or with signs of bleeding or persistent nausea. Continue to drink plenty of fluids and avoid straining and constipation which may be a side effect from taking narcotic pain meds. No heavy lifting greater than 10 pounds (ie a gallon of milk.) Follow-up with MD as well as PMD in office as instructed for continuity of care post-operatively, as per safe Dc plan.

## 2020-12-16 NOTE — DISCHARGE NOTE NURSING/CASE MANAGEMENT/SOCIAL WORK - NSDPDISTO_GEN_ALL_CORE
Pt  with incisions CDI steri strips to scope sites, VS stable Afebrile. pt with positive bowel sounds yazan po diet. Roth DCd and pt voiding without difficulty. seen by MD and cleared for dc to home as per safe DC plan./Home

## 2020-12-20 LAB — SARS-COV-2 N GENE NPH QL NAA+PROBE: NOT DETECTED

## 2020-12-22 LAB — SURGICAL PATHOLOGY STUDY: SIGNIFICANT CHANGE UP

## 2021-02-19 ENCOUNTER — APPOINTMENT (OUTPATIENT)
Dept: UROLOGY | Facility: CLINIC | Age: 86
End: 2021-02-19
Payer: MEDICARE

## 2021-02-19 VITALS
SYSTOLIC BLOOD PRESSURE: 122 MMHG | OXYGEN SATURATION: 95 % | BODY MASS INDEX: 20.06 KG/M2 | HEART RATE: 70 BPM | TEMPERATURE: 94.9 F | WEIGHT: 109 LBS | HEIGHT: 62 IN | DIASTOLIC BLOOD PRESSURE: 75 MMHG | RESPIRATION RATE: 14 BRPM

## 2021-02-19 DIAGNOSIS — N28.9 DISORDER OF KIDNEY AND URETER, UNSPECIFIED: ICD-10-CM

## 2021-02-19 DIAGNOSIS — Z93.6 OTHER ARTIFICIAL OPENINGS OF URINARY TRACT STATUS: ICD-10-CM

## 2021-02-19 DIAGNOSIS — N13.30 UNSPECIFIED HYDRONEPHROSIS: ICD-10-CM

## 2021-02-19 PROCEDURE — 99024 POSTOP FOLLOW-UP VISIT: CPT

## 2021-02-22 PROBLEM — N28.9 NONFUNCTIONING KIDNEY: Status: ACTIVE | Noted: 2020-09-22

## 2021-02-22 PROBLEM — Z93.6 NEPHROSTOMY STATUS: Status: RESOLVED | Noted: 2020-09-22 | Resolved: 2021-02-22

## 2021-02-22 NOTE — ASSESSMENT
[FreeTextEntry1] : s/p R lap nephrectomy for XGP kidney. Pt doing extremely well. \par --RTC as needed

## 2021-02-22 NOTE — PHYSICAL EXAM
[General Appearance - Well Developed] : well developed [General Appearance - Well Nourished] : well nourished [General Appearance - In No Acute Distress] : no acute distress [Abdomen Soft] : soft [Abdomen Hernia] : no hernia was discovered [Abdomen Tenderness] : non-tender [Costovertebral Angle Tenderness] : no ~M costovertebral angle tenderness [FreeTextEntry1] : incisions well healed [Exaggerated Use Of Accessory Muscles For Inspiration] : no accessory muscle use [Oriented To Time, Place, And Person] : oriented to person, place, and time

## 2021-02-22 NOTE — HISTORY OF PRESENT ILLNESS
[FreeTextEntry1] : 89yo female with R non-functioning kidney. She was previously managed with indwelling PCN for infection. She has advanced dementia and pulled the tube shortly after placement. Discussed extensively with the patients family and her care team and plan made for nephrectomy. pt underwent uncomplicated R lap simple nephrectomy 12/2020. path showed XGP. She returns today for follow-up with her son. \par \par Prior to surgery pt with some weight loss and decreased appetite. Son reports that since surgery her energy and appetite are greatly improved. reviewed path that showed XGP

## 2021-03-07 ENCOUNTER — NON-APPOINTMENT (OUTPATIENT)
Age: 86
End: 2021-03-07

## 2021-03-08 ENCOUNTER — TRANSCRIPTION ENCOUNTER (OUTPATIENT)
Age: 86
End: 2021-03-08

## 2021-04-13 ENCOUNTER — RX RENEWAL (OUTPATIENT)
Age: 86
End: 2021-04-13

## 2021-05-12 ENCOUNTER — APPOINTMENT (OUTPATIENT)
Dept: GERIATRICS | Facility: CLINIC | Age: 86
End: 2021-05-12
Payer: MEDICARE

## 2021-05-12 VITALS
SYSTOLIC BLOOD PRESSURE: 110 MMHG | DIASTOLIC BLOOD PRESSURE: 60 MMHG | TEMPERATURE: 96.7 F | WEIGHT: 113.25 LBS | HEIGHT: 62 IN | HEART RATE: 91 BPM | BODY MASS INDEX: 20.84 KG/M2 | OXYGEN SATURATION: 96 % | RESPIRATION RATE: 14 BRPM

## 2021-05-12 DIAGNOSIS — R06.02 SHORTNESS OF BREATH: ICD-10-CM

## 2021-05-12 DIAGNOSIS — M79.18 MYALGIA, OTHER SITE: ICD-10-CM

## 2021-05-12 PROCEDURE — 99214 OFFICE O/P EST MOD 30 MIN: CPT

## 2021-05-12 RX ORDER — NITROFURANTOIN (MONOHYDRATE/MACROCRYSTALS) 25; 75 MG/1; MG/1
100 CAPSULE ORAL
Qty: 36 | Refills: 3 | Status: COMPLETED | COMMUNITY
Start: 2020-07-21 | End: 2021-05-12

## 2021-05-12 RX ORDER — MELATONIN 5 MG
5 CAPSULE ORAL
Refills: 0 | Status: ACTIVE | COMMUNITY
Start: 2021-05-12

## 2021-05-13 PROBLEM — M79.18 BUTTOCK PAIN: Status: ACTIVE | Noted: 2019-02-28

## 2021-05-13 NOTE — PHYSICAL EXAM
[General Appearance - Alert] : alert [General Appearance - In No Acute Distress] : in no acute distress [Sclera] : the sclera and conjunctiva were normal [PERRL With Normal Accommodation] : pupils were equal in size, round, and reactive to light [Extraocular Movements] : extraocular movements were intact [Normal Oral Mucosa] : normal oral mucosa [No Oral Pallor] : no oral pallor [No Oral Cyanosis] : no oral cyanosis [Outer Ear] : the ears and nose were normal in appearance [Oropharynx] : The oropharynx was normal [Neck Appearance] : the appearance of the neck was normal [Neck Cervical Mass (___cm)] : no neck mass was observed [Jugular Venous Distention Increased] : there was no jugular-venous distention [Thyroid Diffuse Enlargement] : the thyroid was not enlarged [Thyroid Nodule] : there were no palpable thyroid nodules [Auscultation Breath Sounds / Voice Sounds] : lungs were clear to auscultation bilaterally [Heart Rate And Rhythm] : heart rate was normal and rhythm regular [Heart Sounds] : normal S1 and S2 [Heart Sounds Gallop] : no gallops [Murmurs] : no murmurs [Heart Sounds Pericardial Friction Rub] : no pericardial rub [Full Pulse] : the pedal pulses are present [Edema] : there was no peripheral edema [Bowel Sounds] : normal bowel sounds [Abdomen Soft] : soft [Abdomen Tenderness] : non-tender [Abdomen Mass (___ Cm)] : no abdominal mass palpated [Cervical Lymph Nodes Enlarged Posterior Bilaterally] : posterior cervical [Cervical Lymph Nodes Enlarged Anterior Bilaterally] : anterior cervical [Supraclavicular Lymph Nodes Enlarged Bilaterally] : supraclavicular [Axillary Lymph Nodes Enlarged Bilaterally] : axillary [Femoral Lymph Nodes Enlarged Bilaterally] : femoral [Inguinal Lymph Nodes Enlarged Bilaterally] : inguinal [No CVA Tenderness] : no ~M costovertebral angle tenderness [No Spinal Tenderness] : no spinal tenderness [Abnormal Walk] : normal gait [Nail Clubbing] : no clubbing  or cyanosis of the fingernails [Musculoskeletal - Swelling] : no joint swelling seen [Motor Tone] : muscle strength and tone were normal [Skin Color & Pigmentation] : normal skin color and pigmentation [Skin Turgor] : normal skin turgor [] : no rash [Deep Tendon Reflexes (DTR)] : deep tendon reflexes were 2+ and symmetric [Sensation] : the sensory exam was normal to light touch and pinprick [No Focal Deficits] : no focal deficits [Affect] : the affect was normal [Over the Past 2 Weeks, Have You Felt Down, Depressed, or Hopeless?] : 1.) Over the past 2 weeks, have you felt down, depressed, or hopeless? No [Over the Past 2 Weeks, Have You Felt Little Interest or Pleasure Doing Things?] : 2.) Over the past 2 weeks, have you felt little interest or pleasure doing things? No [FreeTextEntry1] : pt pleasantly confused. Doesnt remember how many children of grandchildren she ahs but knows her son who is present

## 2021-05-13 NOTE — HISTORY OF PRESENT ILLNESS
[FreeTextEntry1] : 89 yo female who woke up this am who did not want to get out of bed, very unusual for the patient. Pt typically very full of energy and ambulates well. Denies feeling ill. Was able to come to office and walk into office. No fever, no cough.  pt at baseline cognitive function, happy, energetic. Acc to dtr in law and aide pt has been eating well.  Denies foul smelling urine. Nausea , vomiting. [No falls in past year] : Patient reported no falls in the past year [Independent] : walking [Some assistance needed] : feeding [Full assistance needed] : managing finances [Walker] : walker [de-identified] : No safety concerns identified. [de-identified] : No safety concerns identified. [0] : 2) Feeling down, depressed, or hopeless: Not at all

## 2021-05-13 NOTE — REVIEW OF SYSTEMS
[Feeling Poorly] : feeling poorly [Negative] : Heme/Lymph [FreeTextEntry2] : felt poorly earlier in day, now at baseline

## 2021-05-14 ENCOUNTER — RX RENEWAL (OUTPATIENT)
Age: 86
End: 2021-05-14

## 2021-05-14 LAB
ALBUMIN SERPL ELPH-MCNC: 4.6 G/DL
ALP BLD-CCNC: 59 U/L
ALT SERPL-CCNC: 16 U/L
ANION GAP SERPL CALC-SCNC: 15 MMOL/L
APPEARANCE: CLEAR
AST SERPL-CCNC: 21 U/L
BASOPHILS # BLD AUTO: 0.02 K/UL
BASOPHILS NFR BLD AUTO: 0.2 %
BILIRUB SERPL-MCNC: 0.7 MG/DL
BILIRUBIN URINE: NEGATIVE
BLOOD URINE: NEGATIVE
BUN SERPL-MCNC: 35 MG/DL
CALCIUM SERPL-MCNC: 9.8 MG/DL
CHLORIDE SERPL-SCNC: 103 MMOL/L
CO2 SERPL-SCNC: 21 MMOL/L
COLOR: NORMAL
CREAT SERPL-MCNC: 1.18 MG/DL
EOSINOPHIL # BLD AUTO: 0.23 K/UL
EOSINOPHIL NFR BLD AUTO: 2.4 %
FOLATE SERPL-MCNC: >20 NG/ML
GLUCOSE QUALITATIVE U: NEGATIVE
GLUCOSE SERPL-MCNC: 101 MG/DL
HCT VFR BLD CALC: 40.5 %
HGB BLD-MCNC: 12.9 G/DL
IMM GRANULOCYTES NFR BLD AUTO: 0.3 %
KETONES URINE: NEGATIVE
LEUKOCYTE ESTERASE URINE: NEGATIVE
LYMPHOCYTES # BLD AUTO: 4.68 K/UL
LYMPHOCYTES NFR BLD AUTO: 48.3 %
MAN DIFF?: NORMAL
MCHC RBC-ENTMCNC: 31.4 PG
MCHC RBC-ENTMCNC: 31.9 GM/DL
MCV RBC AUTO: 98.5 FL
MONOCYTES # BLD AUTO: 0.63 K/UL
MONOCYTES NFR BLD AUTO: 6.5 %
NEUTROPHILS # BLD AUTO: 4.09 K/UL
NEUTROPHILS NFR BLD AUTO: 42.3 %
NITRITE URINE: NEGATIVE
PH URINE: 6
PLATELET # BLD AUTO: 222 K/UL
POTASSIUM SERPL-SCNC: 5 MMOL/L
PROT SERPL-MCNC: 6.7 G/DL
PROTEIN URINE: NEGATIVE
RBC # BLD: 4.11 M/UL
RBC # FLD: 14.8 %
SODIUM SERPL-SCNC: 139 MMOL/L
SPECIFIC GRAVITY URINE: 1.02
TSH SERPL-ACNC: 1.29 UIU/ML
UROBILINOGEN URINE: NORMAL
VIT B12 SERPL-MCNC: 1809 PG/ML
WBC # FLD AUTO: 9.68 K/UL

## 2021-06-01 ENCOUNTER — RX RENEWAL (OUTPATIENT)
Age: 86
End: 2021-06-01

## 2021-07-19 ENCOUNTER — RX RENEWAL (OUTPATIENT)
Age: 86
End: 2021-07-19

## 2021-08-11 ENCOUNTER — RX RENEWAL (OUTPATIENT)
Age: 86
End: 2021-08-11

## 2021-08-13 ENCOUNTER — RX RENEWAL (OUTPATIENT)
Age: 86
End: 2021-08-13

## 2022-02-25 ENCOUNTER — RX RENEWAL (OUTPATIENT)
Age: 87
End: 2022-02-25

## 2022-03-03 NOTE — DISCHARGE NOTE PROVIDER - NSDCHC_MEDRECSTATUS_GEN_ALL_CORE
[facility] Transitions Initial Follow Up Call    Call within 2 business days of discharge: Yes    Patient: Venancio Ramos Patient : 1960   MRN: [id number]  Reason for Admission: Covid 19 Monitoring   Discharge Date: 3/2/22 RARS: Readmission Risk Score: 19.8 ( )      Last Discharge [facility]       Complaint Diagnosis Description Type Department Provider    22  Post-op pain . .. Admission (Discharged) [facility] 5 Maria G Matthews MD           Spoke with: [facility] attempted outreach 24 hr hospital discharge for care transition follow up. Left HIPPA compliant message and contact information for call back.     Facility: The [address] [place] [address] Transitions 24 Hour Call    Do you have all of your prescriptions and are they filled?: Yes  Care Transitions Interventions         Follow Up  Future Appointments   Date Time Provider Shannen Roy   3/16/2022  2:15 PM Brittny Casillas MD [facility]   2022  9:20 Génesis Keene MD [id number]       Armando Guerra LPN
Admission Reconciliation is Completed  Discharge Reconciliation is Completed

## 2022-03-09 ENCOUNTER — RX RENEWAL (OUTPATIENT)
Age: 87
End: 2022-03-09

## 2022-03-10 ENCOUNTER — RX RENEWAL (OUTPATIENT)
Age: 87
End: 2022-03-10

## 2022-05-20 ENCOUNTER — RX RENEWAL (OUTPATIENT)
Age: 87
End: 2022-05-20

## 2022-08-09 ENCOUNTER — APPOINTMENT (OUTPATIENT)
Dept: GERIATRICS | Facility: CLINIC | Age: 87
End: 2022-08-09

## 2022-08-09 ENCOUNTER — LABORATORY RESULT (OUTPATIENT)
Age: 87
End: 2022-08-09

## 2022-08-09 VITALS
BODY MASS INDEX: 22.89 KG/M2 | OXYGEN SATURATION: 96 % | WEIGHT: 126 LBS | SYSTOLIC BLOOD PRESSURE: 120 MMHG | HEIGHT: 62.4 IN | HEART RATE: 84 BPM | DIASTOLIC BLOOD PRESSURE: 60 MMHG | RESPIRATION RATE: 16 BRPM

## 2022-08-09 DIAGNOSIS — N39.0 URINARY TRACT INFECTION, SITE NOT SPECIFIED: ICD-10-CM

## 2022-08-09 DIAGNOSIS — R41.3 OTHER AMNESIA: ICD-10-CM

## 2022-08-09 DIAGNOSIS — R31.9 HEMATURIA, UNSPECIFIED: ICD-10-CM

## 2022-08-09 PROCEDURE — 99214 OFFICE O/P EST MOD 30 MIN: CPT

## 2022-08-10 ENCOUNTER — LABORATORY RESULT (OUTPATIENT)
Age: 87
End: 2022-08-10

## 2022-08-10 VITALS — TEMPERATURE: 98.1 F

## 2022-08-10 DIAGNOSIS — N39.0 URINARY TRACT INFECTION, SITE NOT SPECIFIED: ICD-10-CM

## 2022-08-10 NOTE — ASSESSMENT
[FreeTextEntry1] : Goals of Care reviewed, keep comfortable, treat what can be treated and be aware of testing based on what we will do with results. \par \par Famil suppotive and aware of Dementia stage 6E and when hospice could be called in for dementia 7C. Pt is verbal and ambulating and no swallowing issues to date. \par \par No hip or abdominal pain. will check labs CBC,CMP, TSH,b12 and folate, check UA and c and s

## 2022-08-10 NOTE — HISTORY OF PRESENT ILLNESS
[FreeTextEntry1] : 89 yo female here with family, son and DIL, bc pt is behaving differently. She isholding her left arm over her abdomen as if in discomfort. She denies pain or discomofrt.  She has been eating and gaingin weigh. Her codnition is worse.  She no longer recoonginzes son or close famiily members. She is not frightened of them but just doestn know their neames.  \par \par She is not coughing or nauseeious, or with sore throat. She denies any issues.\par \par Mp fever, no falls.  Able to walk with a walker / cane.\par \par Pt with 24 hour private care an suportive family.\par \par Pt is DNR and DNI, goals are to keep comofrtable. [No falls in past year] : Patient reported no falls in the past year [Completely Dependent] : Completely dependent. [0] : 2) Feeling down, depressed, or hopeless: Not at all (0) [Memory Lapses Or Loss] : worsened memory impairment [Patient Observed To Be Agitated] : denies agitation [Hostility Toward Caregivers] : denies aggression [Sleep Disturbances] : denies sleep disturbances [] : denies wandering [Fixed Beliefs Contradicted By Reality (Delusions)] : denies delusions [Difficulty Finding Desired Words] : aphasia [de-identified] : having word difficulty and gibberish. no swallowing issues

## 2022-08-12 LAB
ALBUMIN SERPL ELPH-MCNC: 4.5 G/DL
ALP BLD-CCNC: 68 U/L
ALT SERPL-CCNC: 16 U/L
ANION GAP SERPL CALC-SCNC: 13 MMOL/L
APPEARANCE: CLEAR
AST SERPL-CCNC: 21 U/L
BASOPHILS # BLD AUTO: 0.03 K/UL
BASOPHILS NFR BLD AUTO: 0.3 %
BILIRUB SERPL-MCNC: 0.2 MG/DL
BILIRUBIN URINE: NEGATIVE
BLOOD URINE: NEGATIVE
BUN SERPL-MCNC: 39 MG/DL
CALCIUM SERPL-MCNC: 9.5 MG/DL
CHLORIDE SERPL-SCNC: 106 MMOL/L
CO2 SERPL-SCNC: 22 MMOL/L
COLOR: YELLOW
CREAT SERPL-MCNC: 1.52 MG/DL
EGFR: 33 ML/MIN/1.73M2
EOSINOPHIL # BLD AUTO: 0.29 K/UL
EOSINOPHIL NFR BLD AUTO: 2.6 %
FOLATE SERPL-MCNC: >20 NG/ML
GLUCOSE QUALITATIVE U: NEGATIVE
GLUCOSE SERPL-MCNC: 109 MG/DL
HCT VFR BLD CALC: 40.2 %
HGB BLD-MCNC: 13 G/DL
IMM GRANULOCYTES NFR BLD AUTO: 0.3 %
KETONES URINE: NEGATIVE
LEUKOCYTE ESTERASE URINE: ABNORMAL
LYMPHOCYTES # BLD AUTO: 4.75 K/UL
LYMPHOCYTES NFR BLD AUTO: 42.2 %
MAN DIFF?: NORMAL
MCHC RBC-ENTMCNC: 31.6 PG
MCHC RBC-ENTMCNC: 32.3 GM/DL
MCV RBC AUTO: 97.6 FL
MONOCYTES # BLD AUTO: 0.8 K/UL
MONOCYTES NFR BLD AUTO: 7.1 %
NEUTROPHILS # BLD AUTO: 5.35 K/UL
NEUTROPHILS NFR BLD AUTO: 47.5 %
NITRITE URINE: NEGATIVE
PH URINE: 6
PLATELET # BLD AUTO: 204 K/UL
POTASSIUM SERPL-SCNC: 4.7 MMOL/L
PROT SERPL-MCNC: 7 G/DL
PROTEIN URINE: NORMAL
RBC # BLD: 4.12 M/UL
RBC # FLD: 14.5 %
SODIUM SERPL-SCNC: 141 MMOL/L
SPECIFIC GRAVITY URINE: 1.03
TSH SERPL-ACNC: 1.36 UIU/ML
UROBILINOGEN URINE: NORMAL
VIT B12 SERPL-MCNC: 1470 PG/ML
WBC # FLD AUTO: 11.25 K/UL

## 2022-08-16 NOTE — ED ADULT NURSE NOTE - TOBACCO USE
PROCEDURAL PRE-SEDATION ASSESSMENT      Procedure date: 8/16/2022    Indications for Procedure: screening     Planned Procedure: Colonoscopy    PAST MEDICAL HX: .    Past Medical History:   Diagnosis Date   • BPH (benign prostatic hyperplasia)    • CAD (coronary artery disease)    • Goiter    • Hyperlipidemia    • Sleep apnea     does not want to use equipment   • Thyroid enlargement 06/2021        SURGICAL HX:   Past Surgical History:   Procedure Laterality Date   • Stent implant  2018    Bellin        MEDICATIONS:  Medications Prior to Admission   Medication Sig Dispense Refill   • meloxicam (MOBIC) 15 MG tablet TAKE 1 TABLET BY MOUTH DAILY 30 tablet 2   • atorvastatin (LIPITOR) 40 MG tablet TAKE 1 TABLET BY MOUTH DAILY (Patient taking differently: Take 40 mg by mouth nightly.) 90 tablet 2   • fenofibrate (TRICOR) 48 MG tablet Take 1 tablet by mouth daily. (Patient taking differently: Take 48 mg by mouth nightly.) 90 tablet 3   • finasteride (PROSCAR) 5 MG tablet Take 1 tablet by mouth daily. Please schedule follow up for further refills (Patient taking differently: Take 5 mg by mouth daily. Every early afternoon) 90 tablet 3   • Multiple Vitamins-Minerals (MULTIVITAL PO) Take 1 tablet by mouth every evening.     • aspirin 81 MG tablet Take 81 mg by mouth daily.          ALLERGIES:  ALLERGIES:   Allergen Reactions   • Grass Cough   • Hay Fever & [Chlorpheniramine-Ppa Cr] Cough   • Pollen Cough       FAMILY HISTORY:   Family History   Problem Relation Age of Onset   • Cancer Father         PROSTATE   • Cancer Mother         breast , cervical   • Thyroid Mother        SOCIAL HISTORY:    Social History     Socioeconomic History   • Marital status:      Spouse name: Not on file   • Number of children: Not on file   • Years of education: Not on file   • Highest education level: Not on file   Occupational History   • Occupation: film     Tobacco Use   • Smoking status: Never Smoker   • Smokeless  tobacco: Never Used   Vaping Use   • Vaping Use: never used   Substance and Sexual Activity   • Alcohol use: Yes   • Drug use: No   • Sexual activity: Not on file   Other Topics Concern   • Not on file   Social History Narrative            Lives with girlfriend         Daughter , 14 year old.      Social Determinants of Health     Financial Resource Strain: Not on file   Food Insecurity: Not on file   Transportation Needs: Not on file   Physical Activity: Not on file   Stress: Not on file   Social Connections: Not on file   Intimate Partner Violence: Not At Risk   • Social Determinants: Intimate Partner Violence Past Fear: No   • Social Determinants: Intimate Partner Violence Current Fear: No       MEDICAL HISTORY   Anesthesia history: Reviewed  Family anesthesia history: Reviewed  Possible pregnancy (LMP): NO  Previous complications: None    PHYSICAL EXAM  Heart: NORMAL findings  Lungs: NORMAL findings    OTHER FINDINGS  Reviewed current medications and allergies: YES  Reviewed pertinent lab/diagnostic tests: YES    RISK STATUS: ASA 2 Normal patient with mild systemic disease    AIRWAY ASSESSMENT: Mallampati Class II - Soft palate uvula fauces pillars visible.  No difficulty.    PLAN FOR SEDATION: IV Sedation    EKG Monitoring: YES    Risks, benefits and alternatives of procedure were explained, informed consent was obtained.    REASSESSMENT IMMEDIATELY PRIOR TO PROCEDURE:   Patient remains a candidate for the planned sedation and procedure.    Assessing Physician: Tom Montaño MD     Never smoker

## 2022-09-15 ENCOUNTER — RX RENEWAL (OUTPATIENT)
Age: 87
End: 2022-09-15

## 2022-09-16 ENCOUNTER — RX RENEWAL (OUTPATIENT)
Age: 87
End: 2022-09-16

## 2022-10-10 ENCOUNTER — APPOINTMENT (OUTPATIENT)
Dept: GERIATRICS | Facility: CLINIC | Age: 87
End: 2022-10-10

## 2022-10-10 PROCEDURE — 99342 HOME/RES VST NEW LOW MDM 30: CPT

## 2022-10-10 PROCEDURE — 99348 HOME/RES VST EST LOW MDM 30: CPT

## 2022-10-20 NOTE — ASSESSMENT
[FreeTextEntry1] : 88 yo female with advanced dementia, htn, no edema, no distress.\par Meds reviewed\par No change\par Pt is dnr, dni.\par Good care at home.  Aides and family available. 05-Sep-2018 22:39

## 2022-10-20 NOTE — HISTORY OF PRESENT ILLNESS
[FreeTextEntry1] : Pt seen at home. Pt sitting in chair, no distress. Pt repeats same phrase. Pleasant and smiling. Not oriented.Pt appears that her wieght is stable.  Acc to aides, no falls, no pain, no new issues. Pt just repeatring more and more.

## 2022-11-30 NOTE — PROGRESS NOTE ADULT - PROBLEM SELECTOR PROBLEM 2
[facility]  Heart Failure Service Consultation        Roscoe Cosme, male  : 1948  PCP: Sridhar Sanon DO  Attending/Consulting Provider: Franklin Harding DO;Gabo*     Inpatient consult to Cardiology  Consult performed by: Joshua Blount MD  Consult ordered by: Levon Bobby MD        Reason for Consultation: acute decompensated HF, afib with RVR  Chief Complaint   Patient presents with   • Heart Problem     Pt with hx of afib in ed today from cards clinic for rapid afib       SUBJECTIVE:     History of Present Illness:  Roscoe Cosme is a 74 year oldmale with PMHx of atrial fibrillation/tachycardia, ischemic cardiomyopathy, HFrEF 35%, hypertension, diabetes, CAD status post PCI, CVA, sleep apnea, history of prostate cancer, diffuse large B-cell lymphoma s/p chemotherapy, CKD who presented to the ED from cardiology clinic. with worsening shortness of breath for the last 2 weeks.    Patient reports that he has been having worsening lower extremity edema, orthopnea and dyspnea on exertion.  He has had prior hospitalizations due to heart failure exacerbation, usually requiring high dose of diuretics.  He also reports feeling some chest pressure, nonradiating, 3 out of 10 in intensity.  He denies any palpitations, fever, chills or cough.  No sick contacts, no recent travel.  He has been compliant with all of his medications.   He was seen in the office by his cardiologist Dr. Hou for a follow-up yesterday when he was found to be in A. fib with RVR and signs of congestive heart failure, for that reason he was sent to the ED.    In the ED he was significantly hypoxic requiring high levels of oxygen, tachycardic in the 140s with A. fib with RVR.  Labs significant for NT proBNP of 11,000, negative troponin.  Imaging consistent with pulmonary vascular congestion.  Patient got Bumex 5 mg x 1, digoxin 250 mcg x1. Overnight, patient placed on bumex gtt to improved urine output. 
  PMHx:  Past Medical History:   Diagnosis Date   • Acute on chronic heart failure (CMS/[facility]) 05/18/2022   • Age-related cataract of both eyes 02/25/2019   • Anemia    • CAD (coronary artery disease)    • Cardiomyopathy (CMS/[facility])    • Cerebral infarction (CMS/[facility])    • Chronic diastolic congestive heart failure (CMS/[facility]) 03/25/2022   • Chronic kidney disease, stage 4 (severe) (CMS/[facility]) 09/01/2016   • CKD (chronic kidney disease), stage V (CMS/[facility])    • COPD (chronic obstructive pulmonary disease) (CMS/[facility])    • COVID-19 02/03/2022   • Depression    • Diabetes mellitus (CMS/[facility])    • Dysarthria 09/17/2019   • Essential (primary) hypertension    • GI bleed     from lymphoma   • Glaucoma    • High cholesterol    • Hypomagnesemia    • Malignant neoplasm (CMS/[facility])     lymphoma diffuse large cell remission   • Myocardial infarction (CMS/[facility])    • NSTEMI (non-ST elevated myocardial infarction) (CMS/[facility]) 05/18/2022   • OA (osteoarthritis)    • Prostate cancer (CMS/[facility])    • PUD (peptic ulcer disease)    • Retinopathy    • Sleep apnea     noted in H&P, but patient denies. Dr. Peters made aware       PSHx:  Past Surgical History:   Procedure Laterality Date   • Colonoscopy  11/06/2017    Ascedning tubular adenoma. Rectal hyperplastic polyps. no complications with anesthesia per patient   • Esophagogastroduodenoscopy transoral flex w/bx single or mult  02/2022    2 nonbleeding duodenal ulcers   • Eye surgery Bilateral 04/2021    no complications with anesthesia per patient   • Stent implant      no complications with anesthesia per patient       Family Hx:  Family History   Problem Relation Age of Onset   • Diabetes Mother    • Diabetes Maternal Grandmother    • Alcohol Abuse Son    • Cirrhosis Son        Social Hx:  Social History     Tobacco Use   • Smoking status: Current Every Day Smoker     Packs/day: 0.40     Years: 55.00     Pack years: 22.00     Types: Cigarettes     Start date: 1963   • Smokeless tobacco: Never Used 
  • Tobacco comment: 15 cigarettes a day   Substance Use Topics   • Alcohol use: Not Currently     Comment: beer once in a while       Allergies:  ALLERGIES:   Allergen Reactions   • Penicillins Other (See Comments)     Rash per patient       Medications:  Prior to Admission medications    Medication Sig Start Date End Date Taking? Authorizing Provider   sucralfate (CARAFATE) 1 g tablet Take 1 tablet by mouth in the morning and 1 tablet at noon and 1 tablet in the evening.  Patient taking differently: Take 1 g by mouth in the morning and 1 g in the evening. 11/21/22  Yes Sridhar Sanon DO   furosemide (LASIX) 80 MG tablet Take full tablet in AM.Aslo take a 1/2 tablet in PM (until all leg swelling is resolved). 11/18/22  Yes MARSHALL Akbar   warfarin (COUMADIN) 2.5 MG tablet Take 2 tablets by mouth daily. 11/17/22  Yes Bernardo Hou MD   NIFEdipine CC (ADALAT CC) 60 MG 24 hr tablet Take 1 tablet by mouth daily. 11/8/22  Yes Mariana Holly CNP   magnesium oxide (MAG-OX) 400 (240 Mg) MG tablet TAKE 1 TABLET BY MOUTH ONCE DAILY AS DIRECTED 10/17/22  Yes Outside Provider   digoxin (LANOXIN) 0.125 MG tablet Take 0.5 tablets by mouth every other day. 10/18/22  Yes Suleman Yun MD   atorvastatin (LIPITOR) 40 MG tablet TAKE 1 TABLET DAILY AT BEDTIME FOR HIGH CHOLESTEROL 10/4/22  Yes Sridhar Sanon DO   clopidogrel (PLAVIX) 75 MG tablet Take 1 tablet by mouth daily. 8/16/22 8/11/23 Yes Mariana Holly CNP   carvedilol (COREG) 6.25 MG tablet Take 1 tablet by mouth in the morning and 1 tablet in the evening. Take with meals. 8/16/22  Yes Mariana Holly CNP   insulin glargine (LANTUS) 100 UNIT/ML vial solution Inject 20 Units into the skin nightly. 7/6/22  Yes Sridhar Sanon DO   midodrine (PROAMATINE) 5 MG tablet Take 5 mg by mouth 3 times daily as needed. hold if > sp 120 7/6/22  Yes Sridhar Sanon DO   insulin aspart (NovoLOG) 100 UNIT/ML correction dose Inject 8 Units into the skin in the morning and 8 Units 
at noon and 8 Units in the evening. Inject before meals. 6/24/22  Yes Deejay Puente CNP   B complex-vitamin C-folic acid (NEPHRO-EMMANUEL) 0.8 MG Tab Take 1 tablet by mouth daily. Do not start before June 9, 2022. 6/9/22  Yes Mony Bacon   Cholecalciferol (VITAMIN D) 50 mcg (2,000 units) tablet Take 1 tablet by mouth daily. 1/8/20  Yes Sridhar Sanon DO   latanoprost (XALATAN) 0.005 % ophthalmic solution Place 1 drop into both eyes nightly. 9/25/19  Yes Outside Provider   linaclotide (Linzess) 145 MCG capsule Take 1 capsule by mouth 2 times daily as needed (constipation). 10/26/22   Sridhar Sanon DO   Retacrit 95305 UNIT/ML injection Inject 40,000 Units into the skin 1 day a week. 5/16/22   Outside Provider   FeroSul 325 (65 Fe) MG tablet Take 1 tablet by mouth 2 times daily. 5/6/22   Outside Provider   tamsulosin (FLOMAX) 0.4 MG Cap Take 1 capsule by mouth daily. 6/24/22   Deejay Puente CNP   Glucagon, rDNA, (Glucagon Emergency) 1 MG Kit     Outside Provider   Accu-Chek FastClix Lancets Misc Test 3x/day 4/27/22   Sridhar Sanon DO   Insulin Syringe-Needle U-100 (B-D INSULIN SYRINGE ULTRAFINE) 30G X 1/2\" 1 ML Misc Use to inject insulin 4 times a day. 3/22/22   Des Muir MD   blood glucose test strip Test blood sugar 3 times daily. Diagnosis: dm 2. Meter: ACCU-CHECK NESS 3/4/22   Sridhar Sanon DO   buPROPion XL (WELLBUTRIN XL) 150 MG 24 hr tablet Take 1 tablet by mouth every morning. Indications: depression and smoking cessation 7/29/21   Sridhar Sanon DO   Misc. Devices (RUBBER BATH MAT) Misc bath mat    Outside Provider   Elastic Bandages & Supports (DIABETIC SOCKS LARGE) Misc diabetes  crew socks black l    Outside Provider   Insulin Syringe-Needle U-100 (BD INSULIN SYRINGE ULTRAFINE) 31G X 5/16\" 0.5 ML Misc Use 4 times daily with insulin 10/3/19   Sridhar Sanon DO       Current Facility-Administered Medications   Medication Dose Route Frequency Provider Last Rate Last Admin   • sodium chloride 0.9 
% flush bag 25 mL  25 mL Intravenous PRN Levon Bobby MD       • sodium chloride (PF) 0.9 % injection 2 mL  2 mL Intracatheter 2 times per day Levon Bobby MD   2 mL at 11/30/22 0751   • sodium chloride 0.9% infusion   Intravenous Continuous PRN Levon Bobby MD       • sodium chloride 0.9% infusion   Intravenous Continuous PRN Levon Bobby MD       • sodium chloride (NORMAL SALINE) 0.9 % bolus 500 mL  500 mL Intravenous PRN Levon Bobby MD       • atorvastatin (LIPITOR) tablet 40 mg  40 mg Oral Nightly Ama Ramirez MD   40 mg at 11/29/22 2256   • carvedilol (COREG) tablet 6.25 mg  6.25 mg Oral BID  Ama Ramirez MD   6.25 mg at 11/30/22 0840   • clopidogrel (PLAVIX) tablet 75 mg  75 mg Oral Daily Ama Ramirez MD   75 mg at 11/30/22 0840   • dextrose 50 % injection 25 g  25 g Intravenous PRN Ama Ramirez MD       • dextrose 50 % injection 12.5 g  12.5 g Intravenous PRN Ama Ramirez MD       • glucagon (GLUCAGEN) injection 1 mg  1 mg Intramuscular PRN Ama Ramirez MD       • dextrose (GLUTOSE) 40 % gel 15 g  15 g Oral PRN Ama Ramirez MD       • dextrose (GLUTOSE) 40 % gel 30 g  30 g Oral PRN Ama Ramirez MD       • insulin glargine (LANTUS) injection 10 Units  10 Units Subcutaneous Nightly Ama Ramirez MD   10 Units at 11/29/22 2318   • insulin lispro (ADMELOG,HumaLOG) - Correction Dose   Subcutaneous TID  Ama Ramirez MD       • insulin lispro (ADMELOG,HumaLOG) - Correction Dose   Subcutaneous Nightly Ama Ramirez MD       • sucralfate (CARAFATE) tablet 1 g  1 g Oral TID Ama Ramirez MD   1 g at 11/29/22 2249   • latanoprost (XALATAN) 0.005 % ophthalmic solution 1 drop  1 drop Both Eyes Nightly Ama Ramirez MD   1 drop at 11/29/22 2249   • bumetanide (BUMEX) 12.5 mg/50 mL infusion  1 mg/hr Intravenous Continuous Levon Bobby MD 4 mL/hr at 11/30/22 0632 1 mg/hr at 11/30/22 0632   • WARFARIN - PHARMACIST MONITORED Misc   Does not apply See Admin 
Instructions Levon Bobby MD       • [Held by provider] digoxin (LANOXIN) tablet 62.5 mcg  62.5 mcg Oral Every Other Day Ama Ramirez MD           Review of Systems:  Review of Systems   Constitutional: Positive for malaise/fatigue. Negative for fever.   HENT: Negative for congestion and sore throat.    Eyes: Negative for blurred vision and pain.   Cardiovascular: Positive for dyspnea on exertion, irregular heartbeat, leg swelling and orthopnea. Negative for chest pain, palpitations, paroxysmal nocturnal dyspnea and syncope.   Respiratory: Positive for shortness of breath. Negative for cough and wheezing.    Hematologic/Lymphatic: Negative for adenopathy and bleeding problem.   Skin: Negative for rash.   Musculoskeletal: Negative for joint pain, joint swelling and myalgias.   Gastrointestinal: Negative for abdominal pain, constipation, diarrhea, nausea and vomiting.   Genitourinary: Negative for dysuria.   Neurological: Negative for headaches and light-headedness.   Psychiatric/Behavioral: Negative for altered mental status and depression. The patient is not nervous/anxious.    All other systems reviewed and are negative.        OBJECTIVE:     Vital Last Value 24 Hour Range   Temperature 98.8 °F (37.1 °C) (11/30/22 0300) Temp  Min: 98.2 °F (36.8 °C)  Max: 98.8 °F (37.1 °C)   Pulse (!) 141 (11/30/22 0700) Pulse  Min: 123  Max: 155   Respiratory 20 (11/30/22 0700) Resp  Min: 16  Max: 29   Non-Invasive  Blood Pressure (!) 143/114 (11/30/22 0700) BP  Min: 109/71  Max: 152/86   Pulse Oximetry 99 % (11/30/22 0700) SpO2  Min: 85 %  Max: 99 %   Arterial   Blood Pressure   No data recorded        Intake/Output Summary (Last 24 hours) at 11/30/2022 0855  Last data filed at 11/30/2022 0729  Gross per 24 hour   Intake 114.98 ml   Output 2520 ml   Net -2405.02 ml        Wt Readings from Last 3 Encounters:   11/29/22 102.8 kg (226 lb 10.1 oz)   11/29/22 102.1 kg (225 lb)   11/18/22 99.3 kg (219 lb)         Tele: atrial 
Nephrostomy tube displaced
fibrillation with RVR, VR 130s-140s    PHYSICAL EXAMINATION:  Physical Exam    Constitutional: Alert, mild distress, on 6L NC  HENT: mucous membrane moist  Neck: normal range of motion, elevated JVD up to earlob  Cardiovascular: tachycardic, irregular rhythm, S1/S2 normal, no murmur heard. No S3 or S4, no friction rub or gallop. Adequate pulses.   Pulmonary: mild respiratory distress, diffuse crackles present  Chest: No tenderness.   Abdominal: Soft, non distended, non tender. Bowel sounds are normal.   Extremities: Normal range of motion, bilateral 3+ pitting edema up to knees  Skin: warm, normal skin turgor.   Neurological: alert and oriented    DIAGNOSTIC STUDIES:     Labs:  CBC:   Recent Labs   Lab 11/30/22  0419 11/29/22  1820 11/11/22  1057 09/26/22  1604   WBC 14.7* 11.1* 10.2 11.2*   RBC 4.71 4.60 4.87 4.66   HGB 12.0* 11.7* 12.4* 11.5*   HCT 37.9* 37.0* 39.4 37.9*   MCV 80.5 80.4 80.9 81.3   MCHC 31.7* 31.6* 31.5* 30.3*   RDW-CV 18.8* 18.7* 18.8* 20.2*    308 295 295   Lymphocytes, Percent 6 12 17 13     CMP:  Recent Labs   Lab 11/30/22 0419 11/29/22  1820 11/29/22  0000   SODIUM  --  136  --    POTASSIUM  --  3.9  --    CHLORIDE  --  101  --    CO2  --  24  --    BUN  --  57*  --    CREATININE  --  2.96*  --    GLUCOSE  --  169*  --    ALBUMIN  --  3.2*  --    PHOS 3.8  --   --    AST  --  13  --    GPT  --  20  --    ALKPT  --  136*  --    BILIRUBIN  --  0.6  --    MG 1.9  --  2.1     COAGULATION STUDIES:   Recent Labs   Lab 11/30/22  0448 11/29/22  1852 11/28/22  1402   PT 27.4* 24.5*  --    PTT  --  39*  --    INR 2.7 2.4 2.3*     TSH:    Lab Results   Component Value Date    TSH 2.095 03/26/2022    TSH 1.735 03/08/2017     HbA1c:   Hemoglobin A1C (%)   Date Value   11/11/2022 7.2 (H)     LIPID PANEL: No results found  NT-PRONBP:   Recent Labs   Lab 11/29/22  1820 11/11/22  1057 07/28/22  1621 05/18/22  1355   NT-proBNP 11,755* 3,696* 5,896* 14,250*     Troponin: No results 
found    Data:  Encounter Date: 22   Electrocardiogram 12-Lead   Result Value    Ventricular Rate EKG/Min (BPM) 146    Atrial Rate (BPM) 159    QRS-Interval (MSEC) 154    QT-Interval (MSEC) 304    QTc 474    R Axis (Degrees) 116    T Axis (Degrees) -52    REPORT TEXT      Undetermined rhythm  Right axis deviation  Nonspecific intraventricular block  Abnormal ECG  When compared with ECG of  29-MAY-2022 00:17,  Current undetermined rhythm precludes rhythm comparison, needs review  QRS axis  shifted right       Results for orders placed in visit on 10/18/22    TRANSTHORACIC ECHO(TTE) COMPLETE W/ W/O IMAGING AGENT    Impression  *[facility]*  [address] [place] [id number]  [phone]  Transthoracic Echocardiogram (TTE)    Patient: Roscoe Cosme     Study Date/Time:       Oct 18 2022 5:08PM  MRN:     [id number]              FIN#:                  [id number]  :     1948           Ht/Wt:                 172.7cm 100.7kg  Age:     73                   BSA/BMI:               2.23m^2 33.8kg/m^2  Gender:  M                    Baseline BP:           171 / 115  Ordering Physician:   Mariana Holly    Referring Physician:  Mariana Holly Brooke    Performing Physician: Delbert Burroughs MD  Diagnostic Physician: Delbert Burroughs MD  Sonographer:          Talya Burdick RDCS    ------------------------------------------------------------------------------  INDICATIONS:  Atrial Fibrillation, Ischemic Cardiomyopathy, Mitral Valve  Disease.    ------------------------------------------------------------------------------  STUDY CONCLUSIONS  SUMMARY:    1. Left ventricle: The cavity size is normal. Wall thickness is mildly  increased. There is focal basal and concentric hypertrophy. The ejection  fraction was measured by biplane method of disks. There is severe diffuse  hypokinesisregional variations. The ejection fraction is 30%.  2. Aortic valve: The annulus is 
mildly calcified. The valve is functionally  bicuspid. The leaflets are moderately thickened and mildly calcified.  Fusion of the NCC/LCC. Velocity is increased less than expected. There is  mild stenosis. There is significant beat to beat variability due to varying  cycle lengths in atrial fibrillation. The peak systolic velocity is  1.9m/sec. The mean systolic gradient is 5mm Hg. The LVOT to aortic valve  VTI ratio is 0.43.  3. Left atrium: The atrium is mildly dilated.  4. Right ventricle: The cavity size is normal. Systolic function is normal by  visual assessment.  5. Inferior vena cava: The IVC is dilated.  IMPRESSIONS:   The study is unchanged since the study of 05/19/2022.  Compared to the prior study prior mitral valve abnormality is not evident on  current study. There is at least mild AS, with possibility of underestimation  of AS due to low EF. Consider JACKIE vs. CT calcium scoring.  RECOMMENDATIONS:  Consider ischemic evaluation if not done vs. Cardiac MRI.    ------------------------------------------------------------------------------  STUDY DATA:  [place]  Procedure:  A transthoracic echocardiogram was  performed. Image quality was adequate. The study was technically limited due  to body habitus.  M-mode, complete 2D, complete spectral Doppler, and color  Doppler.  Study status:  Routine.  Study completion:  There were no  complications.    FINDINGS    LEFT VENTRICLE:  The cavity size is normal. Wall thickness is mildly  increased. There is focal basal and concentric hypertrophy. Systolic function  is severely reduced. There is severe diffuse hypokinesisregional variations.  Regional wall motion abnormalities cannot be excluded. Unable to accurately  assess left ventricular diastolic function parameters due to atrial  fibrillation.    The ejection fraction was measured by biplane method of  disks. The ejection fraction is 30%. The tissue Doppler parameters are  abnormal.    AORTIC VALVE:  The 
annulus is mildly calcified. The valve is functionally  bicuspid. The leaflets are moderately thickened and mildly calcified. Fusion  of the NCC/LCC. Cusp separation is reduced. Velocity is increased less than  expected. There is mild stenosis. There is significant beat to beat  variability due to varying cycle lengths in atrial fibrillation. There is no  regurgitation. The mean systolic gradient is 5mm Hg. The peak systolic  gradient is 15mm Hg. The LVOT to aortic valve VTI ratio is 0.43. The valve  area is 1.6cm^2. The valve area index is 0.73cm^2/m^2. The ratio of LVOT to  aortic valve peak velocity is 0.35. The valve area is 1.3cm^2. The valve area  index is 0.6cm^2/m^2.    AORTA:  Aortic root: The root is normal-sized.  Ascending aorta: The vessel is normal-sized.  Descending aorta: The vessel is normal-sized.    MITRAL VALVE:  The annulus is mildly calcified. The leaflets are mildly  thickened and mildly calcified. Leaflet separation is at the lower limits of  normal. Inflow velocity is within the normal range. There is no evidence for  stenosis. There is mild regurgitation. The peak diastolic gradient is 7mm Hg.  The valve area by pressure half-time is 4.2cm^2. The valve area index by  pressure half-time is 1.89cm^2/m^2.    ATRIAL SEPTUM:   Color doppler shows no obvious shunt.    LEFT ATRIUM:  The atrium is mildly dilated.    RIGHT VENTRICLE:  The cavity size is normal. Systolic function is normal by  visual assessment. The TAPSE is normal, suggestive of normal RV systolic  function.  Systolic pressure is mildly increased. The estimated peak pressure  is 44mm Hg.       The RV pressure during systole is 44mm Hg.    VENTRICULAR SEPTUM:   Thickness is mildly increased. Septal motion is  dyssynergic. There is no diastolic flattening and no systolic flattening.    PULMONIC VALVE:   Not well visualized. Velocity is within the normal range.  There is no significant regurgitation. The peak systolic gradient is 2mm 
Hg.    TRICUSPID VALVE:  Leaflet separation is normal. There is trivial  regurgitation.    RIGHT ATRIUM:  The atrium is normal in size.       The estimated central  venous pressure is 8mm Hg.    PERICARDIUM:   There is no pericardial effusion.    SYSTEMIC VEINS:  Inferior vena cava: The IVC is dilated.  Respirophasic diameter changes are in  the normal range (>= 50%).    Atrial fibrillation.    ------------------------------------------------------------------------------  Measurements    Left ventricle            Value          Ref       05/19/2022  Left atrium continued     Value          Ref       05/19/2022  MARIA ISABEL, LAX chord        (N) 4.8   cm       4.2 - 5.8 5.2         Area/bsa ES, A2C          10.43 cm^2/m^2 --------- ----------  ESD, LAX chord        (H) 4.1   cm       2.5 - 4.0 4.6         Vol, ES, 1-p A4C      (H) 63    ml       18 - 58   77  MARIA ISABEL/bsa, LAX chord    (N) 2.2   cm/m^2   2.2 - 3.0 2.5         Vol/bsa, ES, 1-p A4C  (N) 28    ml/m^2   12 - 37   37  ESD/bsa, LAX chord    (N) 1.8   cm/m^2   1.3 - 2.1 2.2         Vol, ES, 1-p A2C      (H) 81    ml       18 - 58   119  PW, ED, LAX           (H) 1.2   cm       0.6 - 1.0 1.3         Vol/bsa, ES, 1-p A2C  (N) 36    ml/m^2   11 - 43   57  MARIA ISABEL major ax, A4C         6.9   cm       --------- ----------  Vol, ES, 2-p              75    ml       --------- 96  ESD major ax, A4C         6.7   cm       --------- ----------  Vol/bsa, ES, 2-p      (N) 33    ml/m^2   16 - 34   46  FS major axis, A4C        3     %        --------- ----------  MARIA ISABEL/bsa major ax, A4C     3.1   cm/m^2   --------- ----------  Aortic valve              Value          Ref       05/19/2022  ESD/bsa major ax, A4C     3.0   cm/m^2   --------- ----------  Leaflet sep, MM           1.1   cm       --------- 1.5  GEMINI, A4C                  26.8  cm^2     --------- ----------  Peak v, S                 1.9   m/sec    --------- ----------  JUSTIN, A4C                  20.0  cm^2     --------- 
----------  Mean grad, S              5     mm Hg    --------- 7  FAC, A4C                  25    %        --------- ----------  Peak grad, S              15    mm Hg    --------- ----------  GEMINI, A2C                  37.8  cm^2     --------- ----------  LVOT/AV, VTI ratio        0.43           --------- 0.4  JUSTIN, A2C                  23.1  cm^2     --------- ----------  SHLOMO, VTI                  1.6   cm^2     --------- 1.4  FAC, A2C                  39    %        --------- ----------  SHLOMO/bsa, VTI              0.73  cm^2/m^2 --------- 0.68  IVS, ED               (H) 1.2   cm       0.6 - 1.0 1.1         LVOT/AV, Vpeak ratio      0.35           --------- ----------  PW, ED                (H) 1.2   cm       0.6 - 1.0 1.3         SHLOMO, Vmax                 1.3   cm^2     --------- ----------  IVS/PW, ED                0.94           --------- 0.82        SHLOMO/bsa, Vmax             0.6   cm^2/m^2 --------- ----------  EDV                   (N) 113   ml       62 - 150  137  ESV                   (H) 70    ml       21 - 61   99          Mitral valve              Value          Ref       05/19/2022  EF                    (L) 30    %        52 - 72   30          Peak E                    1.33  m/sec    --------- 1.49  SV                        34    ml       --------- 28          Decel time                176   ms       --------- 137  EDV/bsa               (N) 51    ml/m^2   34 - 74   66          PHT                       52    ms       --------- ----------  ESV/bsa               (H) 32    ml/m^2   11 - 31   48          Peak grad, D              7     mm Hg    --------- 9  SV/bsa                    15    ml/m^2   --------- 13          MVA, PHT                  4.2   cm^2     --------- ----------  ESV, 1-p A4C          (N) 47    ml       22 - 78   ----------  MVA/bsa, PHT              1.89  cm^2/m^2 --------- ----------  SV, 1-p A4C               34    ml       --------- ----------  MR peak v                 4.79  m/sec    
--------- ----------  ESV/bsa, 1-p A4C      (N) 21    ml/m^2   12 - 40   ----------  Peak LV-LA grad S         92    mm Hg    --------- ----------  SV/bsa, 1-p A4C           15    ml/m^2   --------- ----------  Max MR v                  5.22  m/sec    --------- ----------  EDV, 2-p              (N) 117   ml       62 - 150  ----------  Peak LV-LA grad S         109   mm Hg    --------- ----------  ESV, 2-p              (H) 75    ml       21 - 61   ----------  SV, 2-p                   42    ml       --------- ----------  Pulmonic valve            Value          Ref       05/19/2022  EDV/bsa, 2-p          (N) 52    ml/m^2   34 - 74   ----------  Peak v, S                 0.7   m/sec    --------- 1  ESV/bsa, 2-p          (H) 34    ml/m^2   11 - 31   ----------  Peak grad, S              2     mm Hg    --------- 4  SV/bsa, 2-p               18.8  ml/m^2   --------- ----------  Tricuspid valve           Value          Ref       05/19/2022  LVOT                      Value          Ref       05/19/2022  TR peak v             (H) 3     m/sec    <=2.8     2.8  Diam, S                   2.2   cm       --------- ----------  Peak RV-RA grad, S        36    mm Hg    --------- 31  Area                      3.8   cm^2     --------- 4.2  Peak lo, S               0.68  m/sec    --------- 0.77        Aortic root               Value          Ref       05/19/2022  Peak grad, S              2     mm Hg    --------- 2           Root diam, ED         (N) 3.4   cm       2.8 - 4.3 ----------    Right ventricle           Value          Ref       05/19/2022  Ascending aorta           Value          Ref       05/19/2022  TAPSE, MM             (N) 1.8   cm       >=1.7     1.8         AAo AP diam, ED       (N) 3.0   cm       2.2 - 3.8 ----------  Pressure, S               44    mm Hg    --------- 46          AAo AP diam/bsa, ED   (N) 1.4   cm/m^2   1.1 - 1.9 ----------    Left atrium               Value          Ref       05/19/2022  Pulmonary 
artery          Value          Ref       05/19/2022  AP dim, ES            (H) 4.5   cm       3.0 - 4.0 3.7         Pressure, S               42    mm Hg    --------- ----------  AP dim index, ES      (N) 2.0   cm/m^2   1.5 - 2.3 ----------  Area ES, A4C          (N) 20    cm^2     <=20      24          Systemic veins            Value          Ref       05/19/2022  Area/bsa ES, A4C          9.13  cm^2/m^2 --------- ----------  Estimated CVP             8     mm Hg    --------- 15  Area ES, A2C              23    cm^2     --------- 30  Legend:  (L)  and  (H)  kamlesh values outside specified reference range.    (N)  marks values inside specified reference range.    Prepared and electronically signed by  Kamlesh Burroughs MD  10/18/2022 17:40      ASSESSMENT AND PLAN:     Impression:  74 year oldmale with PMHx of atrial fibrillation/tachycardia, ischemic cardiomyopathy, HFrEF 35%, hypertension, diabetes, CAD status post PCI, CVA, sleep apnea, history of prostate cancer, diffuse large B-cell lymphoma s/p chemotherapy, CKD who presented to the ED from cardiology clinic. with worsening shortness of breath for the last 2 weeks.    Diagnosis:   Acute on chronic decompensated heart failure  Ischemic cardiomyopathy HFrEF 35%  GINA thrombus  Afib with RVR  CAD s/p PCI  HTN  DM  HLD  CKD  Hx diffuse large B cell lymphoma s/p chemotherapy    Recommendations:  - Aggressive diuresis. Continue bumex gtt at 1mg/hr. Up titrate to achieve UO goal of net negative 2L  - With better fluid balance, his HR should improved. Received digoxin yesterday. Advise to consult EP to assist with HR control in setting of decompensated HF and GINA thrombus.   - continue coreg 6.25 mg BID, plavix 75 mg daily, warfarin and statin.   - Strict I&Os, weight daily, BMP/Mg daily, Goal UO net negative at least 2L   - Keep Mg>2, K>4  - Monitor on telemetry    Discussed with attending physician Dr. Cooper  Recommendations communicated to primary team.   Please do not 
hesitate to contact us if any questions.     Joshua Blount MD  PGY-4 Cardiology Fellow   [facility]  Please contact via [employer]

## 2022-12-20 NOTE — PATIENT PROFILE ADULT - NSPROPTRIGHTSUPPORTPERSON_GEN_A_NUR
yes Mohs Rapid Report Verbiage: The area of clinically evident tumor was marked with skin marking ink and appropriately hatched.  The initial incision was made following the Mohs approach through the skin.  The specimen was taken to the lab, divided into the necessary number of pieces, chromacoded and processed according to the Mohs protocol.  This was repeated in successive stages until a tumor free defect was achieved.

## 2022-12-27 NOTE — PROGRESS NOTE ADULT - PROBLEM SELECTOR PROBLEM 7
Impression: Keratoconjunctivitis sicca, bilateral: A28.085. Plan: Patient advised that dry eyes may be the cause of symptoms. Advised to use artificial tears as needed. Hypertension, unspecified type

## 2022-12-31 ENCOUNTER — NON-APPOINTMENT (OUTPATIENT)
Age: 87
End: 2022-12-31

## 2022-12-31 DIAGNOSIS — R53.83 OTHER FATIGUE: ICD-10-CM

## 2023-01-02 LAB
ALBUMIN SERPL ELPH-MCNC: 4.6 G/DL
ALP BLD-CCNC: 51 U/L
ALT SERPL-CCNC: 16 U/L
ANION GAP SERPL CALC-SCNC: 12 MMOL/L
AST SERPL-CCNC: 22 U/L
BASOPHILS # BLD AUTO: 0.04 K/UL
BASOPHILS NFR BLD AUTO: 0.4 %
BILIRUB SERPL-MCNC: 0.6 MG/DL
BUN SERPL-MCNC: 24 MG/DL
CALCIUM SERPL-MCNC: 10.1 MG/DL
CHLORIDE SERPL-SCNC: 104 MMOL/L
CO2 SERPL-SCNC: 22 MMOL/L
CREAT SERPL-MCNC: 1.19 MG/DL
EGFR: 44 ML/MIN/1.73M2
EOSINOPHIL # BLD AUTO: 0.3 K/UL
EOSINOPHIL NFR BLD AUTO: 2.7 %
GLUCOSE SERPL-MCNC: 89 MG/DL
HCT VFR BLD CALC: 41.9 %
HGB BLD-MCNC: 13.7 G/DL
IMM GRANULOCYTES NFR BLD AUTO: 0.4 %
LYMPHOCYTES # BLD AUTO: 4.09 K/UL
LYMPHOCYTES NFR BLD AUTO: 36.3 %
MAN DIFF?: NORMAL
MCHC RBC-ENTMCNC: 31.6 PG
MCHC RBC-ENTMCNC: 32.7 GM/DL
MCV RBC AUTO: 96.8 FL
MONOCYTES # BLD AUTO: 0.92 K/UL
MONOCYTES NFR BLD AUTO: 8.2 %
NEUTROPHILS # BLD AUTO: 5.88 K/UL
NEUTROPHILS NFR BLD AUTO: 52 %
PLATELET # BLD AUTO: 226 K/UL
POTASSIUM SERPL-SCNC: 5 MMOL/L
PROT SERPL-MCNC: 7.1 G/DL
RBC # BLD: 4.33 M/UL
RBC # FLD: 14.6 %
SODIUM SERPL-SCNC: 138 MMOL/L
WBC # FLD AUTO: 11.28 K/UL

## 2023-01-03 ENCOUNTER — NON-APPOINTMENT (OUTPATIENT)
Age: 88
End: 2023-01-03

## 2023-01-04 ENCOUNTER — OUTPATIENT (OUTPATIENT)
Dept: OUTPATIENT SERVICES | Facility: HOSPITAL | Age: 88
LOS: 1 days | End: 2023-01-04
Payer: MEDICARE

## 2023-01-04 ENCOUNTER — LABORATORY RESULT (OUTPATIENT)
Age: 88
End: 2023-01-04

## 2023-01-04 ENCOUNTER — RESULT REVIEW (OUTPATIENT)
Age: 88
End: 2023-01-04

## 2023-01-04 ENCOUNTER — NON-APPOINTMENT (OUTPATIENT)
Age: 88
End: 2023-01-04

## 2023-01-04 ENCOUNTER — APPOINTMENT (OUTPATIENT)
Dept: RADIOLOGY | Facility: HOSPITAL | Age: 88
End: 2023-01-04
Payer: MEDICARE

## 2023-01-04 ENCOUNTER — APPOINTMENT (OUTPATIENT)
Dept: GERIATRICS | Facility: CLINIC | Age: 88
End: 2023-01-04
Payer: MEDICARE

## 2023-01-04 VITALS
HEART RATE: 85 BPM | OXYGEN SATURATION: 94 % | SYSTOLIC BLOOD PRESSURE: 140 MMHG | DIASTOLIC BLOOD PRESSURE: 60 MMHG | HEIGHT: 62.4 IN | RESPIRATION RATE: 20 BRPM | BODY MASS INDEX: 23.44 KG/M2 | WEIGHT: 129 LBS

## 2023-01-04 VITALS — TEMPERATURE: 97.4 F

## 2023-01-04 DIAGNOSIS — M54.9 DORSALGIA, UNSPECIFIED: ICD-10-CM

## 2023-01-04 DIAGNOSIS — Z98.890 OTHER SPECIFIED POSTPROCEDURAL STATES: Chronic | ICD-10-CM

## 2023-01-04 DIAGNOSIS — R06.02 SHORTNESS OF BREATH: ICD-10-CM

## 2023-01-04 PROCEDURE — 71110 X-RAY EXAM RIBS BIL 3 VIEWS: CPT | Mod: 26

## 2023-01-04 PROCEDURE — 71046 X-RAY EXAM CHEST 2 VIEWS: CPT

## 2023-01-04 PROCEDURE — 72070 X-RAY EXAM THORAC SPINE 2VWS: CPT | Mod: 26

## 2023-01-04 PROCEDURE — 93000 ELECTROCARDIOGRAM COMPLETE: CPT

## 2023-01-04 PROCEDURE — 71046 X-RAY EXAM CHEST 2 VIEWS: CPT | Mod: 26

## 2023-01-04 PROCEDURE — 71110 X-RAY EXAM RIBS BIL 3 VIEWS: CPT

## 2023-01-04 PROCEDURE — 72070 X-RAY EXAM THORAC SPINE 2VWS: CPT

## 2023-01-04 PROCEDURE — 51798 US URINE CAPACITY MEASURE: CPT

## 2023-01-04 PROCEDURE — 99214 OFFICE O/P EST MOD 30 MIN: CPT | Mod: 25

## 2023-01-04 NOTE — REVIEW OF SYSTEMS
[Feeling Poorly] : feeling poorly [Shortness Of Breath] : shortness of breath [SOB on Exertion] : shortness of breath during exertion [As Noted in HPI] : as noted in HPI [Negative] : Heme/Lymph [Fever] : no fever [Chills] : no chills [Feeling Tired] : not feeling tired [Wheezing] : no wheezing [Cough] : no cough [Orthopnea] : no orthopnea [PND] : no PND [Dysuria] : no dysuria [Incontinence] : no incontinence [Pelvic Pain] : no pelvic pain [Dysmenorrhea] : no dysmenorrhea

## 2023-01-04 NOTE — ADDENDUM
[FreeTextEntry1] : CXR and rib xrays negative except right apical area, also present on previous xray but worse.\par \par Thoracic spine with vertebral compression fractures - two mid thoracic area that aligns with physical exam\par \par Await cbc, bmp and urine tests.\par \par COntinue tylenol ES 1000 mg Tid, spoke with dtrSandra

## 2023-01-04 NOTE — ASSESSMENT
[FreeTextEntry1] : 88 yo female with change in behavior, sob with exertion. \par \par 1) Back pain, sob, under breast pain\par \par CXR, Rib xrays, thoracic spine xray\par \par 2) Urination change\par Bladder scan done, post void residual = 0cc\par \par Labs - cbc, bmp, urinanalysis and culture\par \par Continue tylenol es 500 mg tid until results.

## 2023-01-04 NOTE — PHYSICAL EXAM
[Normal] : normal affect and normal mood [de-identified] : pain on mid back around T10 and a little to the right on palpation

## 2023-01-04 NOTE — HISTORY OF PRESENT ILLNESS
[Severe] : Stage: Severe [Stable] : Status: Stable [None] : The patient is currently asymptomatic [FreeTextEntry1] : 89 yo female here with advanced dementia who family states she is not herself, has a change in behavior. She seems to have some pain. Pt also with some sob with exertion. As pt walked to exam room she was sob and then rested and eased well. \par \par Pt eating well. No fever, no cough. No bowel isssues. Urinating less, but doesn’t have foul smelling urine.\par \par Pt able to answer simple questions. Pt denies pain but appears like she is in pain. [de-identified] : pain, sob

## 2023-01-05 ENCOUNTER — RX RENEWAL (OUTPATIENT)
Age: 88
End: 2023-01-05

## 2023-01-05 DIAGNOSIS — J18.9 PNEUMONIA, UNSPECIFIED ORGANISM: ICD-10-CM

## 2023-01-05 LAB
ANION GAP SERPL CALC-SCNC: 18 MMOL/L
APPEARANCE: CLEAR
BASOPHILS # BLD AUTO: 0.06 K/UL
BASOPHILS NFR BLD AUTO: 0.4 %
BILIRUBIN URINE: NEGATIVE
BLOOD URINE: NEGATIVE
BUN SERPL-MCNC: 33 MG/DL
CALCIUM SERPL-MCNC: 10.3 MG/DL
CHLORIDE SERPL-SCNC: 103 MMOL/L
CO2 SERPL-SCNC: 20 MMOL/L
COLOR: YELLOW
CREAT SERPL-MCNC: 1.31 MG/DL
EGFR: 39 ML/MIN/1.73M2
EOSINOPHIL # BLD AUTO: 0.3 K/UL
EOSINOPHIL NFR BLD AUTO: 2.2 %
GLUCOSE QUALITATIVE U: NEGATIVE
GLUCOSE SERPL-MCNC: 110 MG/DL
HCT VFR BLD CALC: 43.5 %
HGB BLD-MCNC: 14.1 G/DL
IMM GRANULOCYTES NFR BLD AUTO: 0.3 %
KETONES URINE: NORMAL
LEUKOCYTE ESTERASE URINE: ABNORMAL
LYMPHOCYTES # BLD AUTO: 4.76 K/UL
LYMPHOCYTES NFR BLD AUTO: 34.9 %
MAN DIFF?: NORMAL
MCHC RBC-ENTMCNC: 32.2 PG
MCHC RBC-ENTMCNC: 32.4 GM/DL
MCV RBC AUTO: 99.3 FL
MONOCYTES # BLD AUTO: 0.96 K/UL
MONOCYTES NFR BLD AUTO: 7 %
NEUTROPHILS # BLD AUTO: 7.53 K/UL
NEUTROPHILS NFR BLD AUTO: 55.2 %
NITRITE URINE: NEGATIVE
PH URINE: 6
PLATELET # BLD AUTO: 263 K/UL
POTASSIUM SERPL-SCNC: 5.1 MMOL/L
PROTEIN URINE: ABNORMAL
RBC # BLD: 4.38 M/UL
RBC # FLD: 14.2 %
SODIUM SERPL-SCNC: 141 MMOL/L
SPECIFIC GRAVITY URINE: 1.03
UROBILINOGEN URINE: NORMAL
WBC # FLD AUTO: 13.65 K/UL

## 2023-01-06 ENCOUNTER — RX RENEWAL (OUTPATIENT)
Age: 88
End: 2023-01-06

## 2023-01-11 ENCOUNTER — NON-APPOINTMENT (OUTPATIENT)
Age: 88
End: 2023-01-11

## 2023-01-11 RX ORDER — OXYCODONE 5 MG/1
5 TABLET ORAL
Qty: 45 | Refills: 0 | Status: ACTIVE | COMMUNITY
Start: 2023-01-11 | End: 1900-01-01

## 2023-01-19 ENCOUNTER — APPOINTMENT (OUTPATIENT)
Dept: GERIATRICS | Facility: CLINIC | Age: 88
End: 2023-01-19
Payer: MEDICARE

## 2023-01-19 DIAGNOSIS — L02.12 FURUNCLE OF NECK: ICD-10-CM

## 2023-01-19 DIAGNOSIS — S22.080K WEDGE COMPRESSION FRACTURE OF T11-T12 VERTEBRA, SUBSEQUENT ENCOUNTER FOR FRACTURE WITH NONUNION: ICD-10-CM

## 2023-01-19 DIAGNOSIS — S22.070K: ICD-10-CM

## 2023-01-19 PROCEDURE — 99213 OFFICE O/P EST LOW 20 MIN: CPT | Mod: 95

## 2023-01-21 PROBLEM — S22.070K: Status: ACTIVE | Noted: 2023-01-21

## 2023-01-21 PROBLEM — S22.080K: Status: ACTIVE | Noted: 2023-01-21

## 2023-01-21 PROBLEM — L02.12 BOIL, NECK: Status: ACTIVE | Noted: 2023-01-21

## 2023-01-21 NOTE — PHYSICAL EXAM
[Normal] : the sclera and conjunctiva were normal, extraocular movements were intact, pupils were equal in size, round, and reactive to light [de-identified] : pt laying in bed, tired [de-identified] : e [de-identified] : erythematous lesion, nummular, which looks like boil - large pimple, in front of neck

## 2023-01-21 NOTE — ASSESSMENT
[FreeTextEntry1] : 89 yo with VCPx 2 and new boil, \par \par 1) Boil -Pt completed antibiotic recently. Lesion getting a "head" on it. May need to be lanced if it does not drain itself.\par 2) VCF - pain control adequate. Its been 3 weeks.  \par 3) Dementia - worse behavior, less functional, pt may be in a new stage or behavior less bc of pain and infection.  \par 4) ACP - pt is DNR and DNI.

## 2023-01-21 NOTE — HISTORY OF PRESENT ILLNESS
[Home] : at home, [unfilled] , at the time of the visit. [Medical Office: ([facility])___] : at the medical office located in  [Verbal consent obtained from patient] : the patient, [unfilled] [FreeTextEntry1] : 88 yo female with a new lesion on neck which family says is bothering her and warm.  THey have been putting warm soaks on it.  Pt recovering from 2 VCFs and is on oxyodone prn and tylenol tid for pain. She is eating well. NO fevers. No sob, no diarrhea. Pt is in a decreased functional state. Pt unable to brush own teeth. Less able to do her own showers.\par \par No chills No complaints (but never has been able to complain).  She is less functional. [No falls in past year] : Patient reported no falls in the past year [Designated Healthcare Proxy] : Designated healthcare proxy [Name: ___] : Health Care Proxy's Name: [unfilled]  [Relationship: ___] : Relationship: [unfilled] [FreeTextEntry4] : Sierra often defers to son, Yoandy (her brother), who pt lives with.

## 2023-01-30 ENCOUNTER — APPOINTMENT (OUTPATIENT)
Dept: GERIATRICS | Facility: CLINIC | Age: 88
End: 2023-01-30
Payer: MEDICARE

## 2023-01-30 DIAGNOSIS — M54.9 DORSALGIA, UNSPECIFIED: ICD-10-CM

## 2023-01-30 DIAGNOSIS — M48.50XA COLLAPSED VERTEBRA, NOT ELSEWHERE CLASSIFIED, SITE UNSPECIFIED, INITIAL ENCOUNTER FOR FRACTURE: ICD-10-CM

## 2023-01-30 PROCEDURE — G0444 DEPRESSION SCREEN ANNUAL: CPT | Mod: 59

## 2023-01-30 PROCEDURE — 99213 OFFICE O/P EST LOW 20 MIN: CPT

## 2023-02-01 PROBLEM — M48.50XA VERTEBRAL COMPRESSION FRACTURE: Status: ACTIVE | Noted: 2023-02-01

## 2023-02-01 NOTE — ASSESSMENT
[FreeTextEntry1] : 89 yo female with advanced dementia in a new phase needing help with personal care ADLS and being fed. Not incontinent of urine or stool. Speaking a few words. AMbulating a bitl State 6b or 6c. \par \par Continue present regimen, will follow and have discussed hospice care when appropriate (stage 7C)

## 2023-02-01 NOTE — PHYSICAL EXAM
[Normal] : no focal deficits [de-identified] : pt anisha in chair [de-identified] : erythematous lesion, nummular, which looks like boil - large pimple, in front of neck

## 2023-02-01 NOTE — HISTORY OF PRESENT ILLNESS
[FreeTextEntry1] : 89 yo female with VCF and eating less, Less mobile. Family asking about hospice care. Pt with dementia. Prior to VCF pt ambulating, talkging and eating and was able brush hair, toilet self and feed self.  Now pt needs help with brushing teeth, personal grooming, toileitng and showering.  Pt has to be cued to eat and sometimes to fed. Pt does not spit out food. Pt did not walk last week bc of pain but this week she is walking and sitting in chair, less in bed this week.\par \par Has sob with exertion\par No sob at rest\par No cough\par No diarrhea or consitipation [1] : 1) Little interest or pleasure doing things for several days (1) [0] : 2) Feeling down, depressed, or hopeless: Not at all (0) [I have developed a follow-up plan documented below in the note.] : I have developed a follow-up plan documented below in the note.

## 2023-03-09 ENCOUNTER — APPOINTMENT (OUTPATIENT)
Dept: OTOLARYNGOLOGY | Facility: CLINIC | Age: 88
End: 2023-03-09

## 2023-03-13 ENCOUNTER — APPOINTMENT (OUTPATIENT)
Dept: OTOLARYNGOLOGY | Facility: CLINIC | Age: 88
End: 2023-03-13
Payer: MEDICARE

## 2023-03-13 VITALS
BODY MASS INDEX: 21.62 KG/M2 | SYSTOLIC BLOOD PRESSURE: 128 MMHG | DIASTOLIC BLOOD PRESSURE: 70 MMHG | HEIGHT: 62.4 IN | OXYGEN SATURATION: 96 % | TEMPERATURE: 97.6 F | HEART RATE: 82 BPM | WEIGHT: 119 LBS

## 2023-03-13 PROCEDURE — G0268 REMOVAL OF IMPACTED WAX MD: CPT

## 2023-03-13 PROCEDURE — 99202 OFFICE O/P NEW SF 15 MIN: CPT | Mod: 25

## 2023-03-16 NOTE — PHYSICAL EXAM
[de-identified] : bilateral cerumen removed, TM intact [Midline] : trachea located in midline position [Normal] : no rashes

## 2023-03-16 NOTE — PROCEDURE
[FreeTextEntry1] : Cerumen removal bilaterally [FreeTextEntry2] : Clogged ears [FreeTextEntry3] : Procedure: Removal of bilateral cerumen with microscopy assistance\par \par Pre-operative diagnosis: Ear pain and hearing loss\par \par Details:\par A speculum was placed into the right external auditory canal and the ear canal and tympanic membrane was viewed under otomicroscopy. Cerumen was present within the canal. This was removed using suction and ear curette. The tympanic membrane was viewed intact. There was no evidence of middle ear effusion. An identical procedure was performed on the left side. The tympanic membrane was intact. There was no evidence of middle ear effusion.\par \par Post-operative diagnosis: bilateral cerumen\par

## 2023-03-16 NOTE — HISTORY OF PRESENT ILLNESS
[de-identified] : 89 year old female with bilateral decreased hearing and clogged sensation. denies otorrhea, vertigo, tinnitus. She denies pain.

## 2023-05-15 ENCOUNTER — RX RENEWAL (OUTPATIENT)
Age: 88
End: 2023-05-15

## 2023-08-24 ENCOUNTER — RX RENEWAL (OUTPATIENT)
Age: 88
End: 2023-08-24

## 2023-11-02 ENCOUNTER — RX RENEWAL (OUTPATIENT)
Age: 88
End: 2023-11-02

## 2023-11-13 NOTE — ED ADULT NURSE NOTE - NS ED NOTE ABUSE RESPONSE YN
Yes Split-Thickness Skin Graft Text: Because of the size and thickness of the defect, and to provide coverage and facilitate healing with minimal contraction, a split-thickness skin graft was planned.  The donor site was marked and the graft harvested by scalpel, Weck blade, or dermatome.  The graft was then trimmed to fit the defect.  It was sutured into place and a bolster dressing applied.

## 2023-11-28 ENCOUNTER — RX RENEWAL (OUTPATIENT)
Age: 88
End: 2023-11-28

## 2023-12-05 ENCOUNTER — APPOINTMENT (OUTPATIENT)
Dept: GERIATRICS | Facility: CLINIC | Age: 88
End: 2023-12-05
Payer: MEDICARE

## 2023-12-05 DIAGNOSIS — E78.5 HYPERLIPIDEMIA, UNSPECIFIED: ICD-10-CM

## 2023-12-05 PROCEDURE — 99349 HOME/RES VST EST MOD MDM 40: CPT

## 2023-12-06 VITALS — BODY MASS INDEX: 20.4 KG/M2 | WEIGHT: 113 LBS

## 2023-12-06 VITALS — SYSTOLIC BLOOD PRESSURE: 110 MMHG | DIASTOLIC BLOOD PRESSURE: 60 MMHG

## 2023-12-06 PROBLEM — E78.5 HLD (HYPERLIPIDEMIA): Status: ACTIVE | Noted: 2019-04-29

## 2023-12-07 ENCOUNTER — LABORATORY RESULT (OUTPATIENT)
Age: 88
End: 2023-12-07

## 2023-12-12 ENCOUNTER — APPOINTMENT (OUTPATIENT)
Dept: GERIATRICS | Facility: CLINIC | Age: 88
End: 2023-12-12
Payer: MEDICARE

## 2023-12-12 DIAGNOSIS — Z23 ENCOUNTER FOR IMMUNIZATION: ICD-10-CM

## 2023-12-12 PROCEDURE — G0008: CPT

## 2023-12-12 PROCEDURE — 99349 HOME/RES VST EST MOD MDM 40: CPT

## 2023-12-12 PROCEDURE — 90662 IIV NO PRSV INCREASED AG IM: CPT

## 2023-12-12 RX ORDER — ALPRAZOLAM 1 MG/1
1 TABLET, ORALLY DISINTEGRATING ORAL
Qty: 30 | Refills: 0 | Status: DISCONTINUED | COMMUNITY
Start: 2021-03-07 | End: 2023-10-01

## 2023-12-12 RX ORDER — CIPROFLOXACIN HYDROCHLORIDE 250 MG/1
250 TABLET, FILM COATED ORAL
Qty: 6 | Refills: 0 | Status: COMPLETED | COMMUNITY
Start: 2022-08-10 | End: 2022-12-12

## 2023-12-12 RX ORDER — LEVOFLOXACIN 500 MG/1
500 TABLET, FILM COATED ORAL DAILY
Qty: 5 | Refills: 0 | Status: COMPLETED | COMMUNITY
Start: 2023-01-05 | End: 2023-01-10

## 2023-12-13 NOTE — HISTORY OF PRESENT ILLNESS
[FreeTextEntry1] : 89 yo female w/ a PMH of htn, hld, dementia, anxiety and depression, s/p nephrectomy was seen in her home with her caregiver and son present. Pt was not able to provide history given underlying dementia. Caregiver reports no concerns. Son states patients dementia appears to be progressing as he is noticing change in behavior. For example she sometimes sits and glares without saying anything. Today, pt was very pleasant, kept repeating certain phrases. No behavioral disturbances reported.   HTN Blood pressure today is 118/60. No dizziness reported. No falls.   Dementia  As stated above.   B12 deficiency B12 levels >2000.  Discussed with patients son.  [[id number]] : 0

## 2023-12-13 NOTE — REVIEW OF SYSTEMS
[Fever] : no fever [Chills] : no chills [Heart Rate Is Slow] : the heart rate was not slow [Heart Rate Is Fast] : the heart rate was not fast [Chest Pain] : no chest pain [Palpitations] : no palpitations [Shortness Of Breath] : no shortness of breath [Wheezing] : no wheezing [Cough] : no cough [Abdominal Pain] : no abdominal pain [Vomiting] : no vomiting [Constipation] : no constipation [Diarrhea] : no diarrhea [Skin Lesions] : no skin lesions [Confused] : no confusion [Dizziness] : no dizziness

## 2024-01-01 NOTE — ASSESSMENT
[FreeTextEntry1] : Pt more fatigued, eating poorly, but vss /80 Piuse 90.  Pain is likely sacral fracture.  Pt with afew problems - fracutres, UPJ dialatiion with UTI completed antibiotics, Breast implant rupture on left, \par \par 1) UPJ dilation - check labs, will ask urologist if needs to be drained. Pt has non-functioning right kidney\par 2) Sacral and pelvic fracture - pain management, supportive , tylenol, feed\par 3) Breast implant - not new and no pain, will follow , check labs\par 4) Care - needs 24 hour care, will need feedking, pain management, check labs. If not imporved may need hospital. Discussed with family. Aware.\par \par DNR/DNI\par 
Single liveborn infant delivered vaginally

## 2024-02-09 ENCOUNTER — RX RENEWAL (OUTPATIENT)
Age: 89
End: 2024-02-09

## 2024-02-25 ENCOUNTER — NON-APPOINTMENT (OUTPATIENT)
Age: 89
End: 2024-02-25

## 2024-02-26 ENCOUNTER — APPOINTMENT (OUTPATIENT)
Dept: GERIATRICS | Facility: CLINIC | Age: 89
End: 2024-02-26
Payer: MEDICARE

## 2024-02-26 DIAGNOSIS — E55.9 VITAMIN D DEFICIENCY, UNSPECIFIED: ICD-10-CM

## 2024-02-26 DIAGNOSIS — E53.8 DEFICIENCY OF OTHER SPECIFIED B GROUP VITAMINS: ICD-10-CM

## 2024-02-26 DIAGNOSIS — I10 ESSENTIAL (PRIMARY) HYPERTENSION: ICD-10-CM

## 2024-02-26 PROCEDURE — 99349 HOME/RES VST EST MOD MDM 40: CPT

## 2024-02-26 PROCEDURE — 99497 ADVNCD CARE PLAN 30 MIN: CPT

## 2024-02-26 NOTE — ASSESSMENT
[FreeTextEntry1] : Met with HCP to discuss pts status and possiblity of next phases. Goals of care discussion held and MOLST orders reviewed. THirty min discussion held. Pt is DNR/DNI.MOLST form in chart.

## 2024-02-26 NOTE — GOALS
[FreeTextEntry2] : dtr [FreeTextEntry1] : Joyce [FreeTextEntry7] : Met with HCP and son to discuss pts status and possiblity of next phases. Goals of care discussion held and MOLST orders reviewed. THirty min discussion held. Pt is DNR/DNI.MOLST form in chart.  No changes. MOLST 2020 in chart, no changes.

## 2024-02-26 NOTE — HISTORY OF PRESENT ILLNESS
[FreeTextEntry1] : 89 yo female living at home wiht son and wife. Has 24 hour care. Pt eating less and has lost weight. Aides say pt now weighing 105# and in March 2023 wighed 119#. Approx 15# weight loss. Pt is FAST stege 6B or 7A.  Strating to be inconitninet, needs reminding to eat food.   Pt has limited words using. Repeats "que cer cera. see you later alligator"  Pt looks thinner, but not in any distress. No sob, no cp, no leg or arm pain, No nausea. [No falls in past year] : Patient reported no falls in the past year [Completely Dependent] : Completely dependent. [Severe] : Stage: Severe [Memory Lapses Or Loss] : worsened memory impairment [Worse] : Status: Worse [Hostility Toward Caregivers] : denies aggression [] : denies wandering [Patient Observed To Be Agitated] : denies agitation [Fixed Beliefs Contradicted By Reality (Delusions)] : denies delusions [de-identified] : incontinence [Difficulty Finding Desired Words] : worsened difficulty finding desired words [Designated Healthcare Proxy] : Designated healthcare proxy [Name: ___] : Health Care Proxy's Name: [unfilled]  [DNR] : DNR [Relationship: ___] : Relationship: [unfilled] [Time Spent: ___ minutes] : Time Spent: [unfilled] minutes [DNI] : DNI [FreeTextEntry4] : Spoke at length about hospital, dementia course, when to get hospice care on board. Dementia state, prognosis. Pt is FAST 6B or 7A.  Not yet hospice eligible.

## 2024-02-26 NOTE — HISTORY OF PRESENT ILLNESS
[No falls in past year] : Patient reported no falls in the past year [FreeTextEntry1] : 89 yo female living at home wiht son and wife. Has 24 hour care. Pt eating less and has lost weight. Aides say pt now weighing 105# and in March 2023 wighed 119#. Approx 15# weight loss. Pt is FAST stege 6B or 7A.  Strating to be inconitninet, needs reminding to eat food.   Pt has limited words using. Repeats "que cer cera. see you later alligator"  Pt looks thinner, but not in any distress. No sob, no cp, no leg or arm pain, No nausea. [Completely Dependent] : Completely dependent. [Severe] : Stage: Severe [Memory Lapses Or Loss] : worsened memory impairment [Worse] : Status: Worse [Hostility Toward Caregivers] : denies aggression [] : denies wandering [Patient Observed To Be Agitated] : denies agitation [Fixed Beliefs Contradicted By Reality (Delusions)] : denies delusions [Difficulty Finding Desired Words] : worsened difficulty finding desired words [de-identified] : incontinence [Designated Healthcare Proxy] : Designated healthcare proxy [Name: ___] : Health Care Proxy's Name: [unfilled]  [DNR] : DNR [Relationship: ___] : Relationship: [unfilled] [DNI] : DNI [Time Spent: ___ minutes] : Time Spent: [unfilled] minutes [FreeTextEntry4] : Spoke at length about hospital, dementia course, when to get hospice care on board. Dementia state, prognosis. Pt is FAST 6B or 7A.  Not yet hospice eligible.

## 2024-02-26 NOTE — GOALS
[FreeTextEntry1] : Joyce [FreeTextEntry2] : dtr [FreeTextEntry7] : Met with HCP and son to discuss pts status and possiblity of next phases. Goals of care discussion held and MOLST orders reviewed. THirty min discussion held. Pt is DNR/DNI.MOLST form in chart.  No changes. MOLST 2020 in chart, no changes.

## 2024-02-26 NOTE — ASSESSMENT
[FreeTextEntry1] : Met with HCP to discuss pts status and possiblity of next phases. Goals of care discussion held and MOLST orders reviewed. THirty min discussion held. Pt is DNR/DNI.MOLST form in chart. Mildly to Moderately Impaired: difficulty hearing in some environments or speaker may need to increase volume or speak distinctly

## 2024-02-26 NOTE — PHYSICAL EXAM
[No Acute Distress] : in no acute distress [Alert] : alert [Normal Outer Ear/Nose] : the ears and nose were normal in appearance [Normal Appearance] : the appearance of the neck was normal [No Acc Muscle Use] : no accessory muscle use [Supple] : the neck was supple [No Respiratory Distress] : no respiratory distress [Auscultation Breath Sounds / Voice Sounds] : lungs were clear to auscultation bilaterally [Respiration, Rhythm And Depth] : normal respiratory rhythm and effort [Heart Rate And Rhythm] : heart rate was normal and rhythm regular [Bowel Sounds] : normal bowel sounds [Abdomen Tenderness] : non-tender [Abdomen Soft] : soft [No Spinal Tenderness] : no spinal tenderness [Normal Turgor] : normal skin turgor [Normal Color / Pigmentation] : normal skin color and pigmentation [No Focal Deficits] : no focal deficits [Normal Affect] : the affect was normal [Normal Mood] : the mood was normal

## 2024-02-26 NOTE — PHYSICAL EXAM
[No Acute Distress] : in no acute distress [Alert] : alert [Normal Outer Ear/Nose] : the ears and nose were normal in appearance [Normal Appearance] : the appearance of the neck was normal [Supple] : the neck was supple [No Respiratory Distress] : no respiratory distress [No Acc Muscle Use] : no accessory muscle use [Respiration, Rhythm And Depth] : normal respiratory rhythm and effort [Auscultation Breath Sounds / Voice Sounds] : lungs were clear to auscultation bilaterally [Bowel Sounds] : normal bowel sounds [Heart Rate And Rhythm] : heart rate was normal and rhythm regular [Abdomen Tenderness] : non-tender [Abdomen Soft] : soft [Normal Color / Pigmentation] : normal skin color and pigmentation [Normal Turgor] : normal skin turgor [No Spinal Tenderness] : no spinal tenderness [Normal Affect] : the affect was normal [No Focal Deficits] : no focal deficits [Normal Mood] : the mood was normal

## 2024-05-09 ENCOUNTER — RX RENEWAL (OUTPATIENT)
Age: 89
End: 2024-05-09

## 2024-05-09 RX ORDER — ESCITALOPRAM OXALATE 5 MG/1
5 TABLET ORAL
Qty: 90 | Refills: 2 | Status: ACTIVE | COMMUNITY
Start: 2020-07-30 | End: 1900-01-01

## 2024-05-21 ENCOUNTER — RX RENEWAL (OUTPATIENT)
Age: 89
End: 2024-05-21

## 2024-06-05 ENCOUNTER — OUTPATIENT (OUTPATIENT)
Dept: OUTPATIENT SERVICES | Facility: HOSPITAL | Age: 89
LOS: 1 days | End: 2024-06-05
Payer: MEDICARE

## 2024-06-05 ENCOUNTER — RESULT REVIEW (OUTPATIENT)
Age: 89
End: 2024-06-05

## 2024-06-05 ENCOUNTER — APPOINTMENT (OUTPATIENT)
Dept: RADIOLOGY | Facility: HOSPITAL | Age: 89
End: 2024-06-05

## 2024-06-05 DIAGNOSIS — R07.89 OTHER CHEST PAIN: ICD-10-CM

## 2024-06-05 DIAGNOSIS — R07.81 PLEURODYNIA: ICD-10-CM

## 2024-06-05 DIAGNOSIS — Z98.890 OTHER SPECIFIED POSTPROCEDURAL STATES: Chronic | ICD-10-CM

## 2024-06-05 PROCEDURE — 71120 X-RAY EXAM BREASTBONE 2/>VWS: CPT

## 2024-06-05 PROCEDURE — 71046 X-RAY EXAM CHEST 2 VIEWS: CPT | Mod: 26

## 2024-06-05 PROCEDURE — 71120 X-RAY EXAM BREASTBONE 2/>VWS: CPT | Mod: 26

## 2024-06-05 PROCEDURE — 71100 X-RAY EXAM RIBS UNI 2 VIEWS: CPT | Mod: 26,LT

## 2024-06-05 PROCEDURE — 71100 X-RAY EXAM RIBS UNI 2 VIEWS: CPT

## 2024-06-05 PROCEDURE — 71046 X-RAY EXAM CHEST 2 VIEWS: CPT

## 2024-06-05 RX ORDER — OXYCODONE 5 MG/1
5 TABLET ORAL
Qty: 15 | Refills: 0 | Status: ACTIVE | COMMUNITY
Start: 2024-06-05 | End: 1900-01-01

## 2024-06-06 ENCOUNTER — APPOINTMENT (OUTPATIENT)
Dept: GERIATRICS | Facility: CLINIC | Age: 89
End: 2024-06-06
Payer: MEDICARE

## 2024-06-06 VITALS — SYSTOLIC BLOOD PRESSURE: 124 MMHG | DIASTOLIC BLOOD PRESSURE: 70 MMHG

## 2024-06-06 DIAGNOSIS — F32.A ANXIETY DISORDER, UNSPECIFIED: ICD-10-CM

## 2024-06-06 DIAGNOSIS — F41.9 ANXIETY DISORDER, UNSPECIFIED: ICD-10-CM
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
## 2024-06-06 DIAGNOSIS — F03.C0 UNSPECIFIED DEMENTIA, SEVERE, WITHOUT BEHAVIORAL DISTURBANCE, PSYCHOTIC DISTURBANCE, MOOD DISTURBANCE, AND ANXIETY: ICD-10-CM

## 2024-06-06 PROCEDURE — 99349 HOME/RES VST EST MOD MDM 40: CPT

## 2024-06-06 NOTE — PHYSICAL EXAM
[Alert] : alert [No Acute Distress] : in no acute distress [EOMI] : extraocular movements were intact [Normal Appearance] : the appearance of the neck was normal [Supple] : the neck was supple [No Respiratory Distress] : no respiratory distress [No Acc Muscle Use] : no accessory muscle use [Respiration, Rhythm And Depth] : normal respiratory rhythm and effort [Auscultation Breath Sounds / Voice Sounds] : lungs were clear to auscultation bilaterally [Normal S1, S2] : normal S1 and S2 [Heart Rate And Rhythm] : heart rate was normal and rhythm regular [Abdomen Tenderness] : non-tender [Abdomen Soft] : soft [No Focal Deficits] : no focal deficits [Normal Affect] : the affect was normal [Normal Mood] : the mood was normal [de-identified] : bruising noted around the sternum, no difficulty breathing noted [de-identified] : pleasant, oriented to self only [de-identified] : unsteady gait

## 2024-06-06 NOTE — REVIEW OF SYSTEMS
[Fever] : no fever [Chills] : no chills [Shortness Of Breath] : no shortness of breath [Cough] : no cough [Constipation] : no constipation [Diarrhea] : no diarrhea [Confused] : no confusion [FreeTextEntry2] : Limited due to dementia

## 2024-06-06 NOTE — HISTORY OF PRESENT ILLNESS
[FreeTextEntry1] : 89 yo female w/ a PMH of htn, hld, dementia, anxiety and depression, s/p nephrectomy was seen in her home for a fall that occurred 2 nights ago. Patient was with her HHA walking out of the room when she tripped and had a near fall. Patients HHA was able to prevent the fall by holding onto her but she may have exerted too much force leading to bruising around the sternum. Chest xray was done which did not show any rib fractures.   HTN Blood pressure today 124/70. Not taking antihypertensives at this time.   Dementia  Patient was pleasant today.  Does keep repeating her usual phrases. No behavioral disturbances reported.  [Medical reason not done] : Medical reason not done [de-identified] : dementia

## 2024-12-03 NOTE — ED PROVIDER NOTE - NS CPE EDP MUSC SPINE EXAM
Edi Merritt. Patient reporting muscle strain of lower back during visit today and requesting refill of flexeril. Can you/your team please help facilitate? Thanks!
.

## 2024-12-10 ENCOUNTER — RX RENEWAL (OUTPATIENT)
Age: 88
End: 2024-12-10

## 2024-12-26 NOTE — PROGRESS NOTE ADULT - PROBLEM/PLAN-1
Medicare Wellness Visit  Plan for Preventive Care    A good way for you to stay healthy is to use preventive care.  Medicare covers many services that can help you stay healthy.* The goal of these services is to find any health problems as quickly as possible. Finding problems early can help make them easier to treat.  Your personal plan below lists the services you may need and when they are due.      Health Maintenance Summary       Shingles Vaccine (1 of 2)  Never done    Colorectal Cancer Screen (Colonoscopy - Every 10 Years)  Ordered on 12/26/2024    COVID-19 Vaccine (5 - 2024-25 season)  Overdue since 9/1/2024    Traditional Medicare- Medicare Wellness Visit (Yearly)  Overdue since 11/1/2024    Depression Screening (Yearly)  Next due on 12/26/2025    DTaP/Tdap/Td Vaccine (2 - Td or Tdap)  Next due on 6/29/2030    Hepatitis C Screening   Completed    Osteoporosis Screening   Completed    Influenza Vaccine   Completed    Pneumococcal Vaccine 65+   Completed    Meningococcal Vaccine   Aged Out    Hepatitis B Vaccine (For Physician/APC Discussion)   Aged Out    HPV Vaccine   Aged Out    Breast Cancer Screening   Discontinued             Preventive Care for Women and Men    Heart Screenings (Cardiovascular):  Blood tests are used to check your cholesterol, lipid and triglyceride levels. High levels can increase your risk for heart disease and stroke. High levels can be treated with medications, diet and exercise. Lowering your levels can help keep your heart and blood vessels healthy.  Your provider will order these tests if they are needed.    An ultrasound is done to see if you have an abdominal aortic aneurysm (AAA).  This is an enlargement of one of the main blood vessels that delivers blood to the body.   In the [place], 9,000 deaths are caused by AAA.  You may not even know you have this problem and as many as 1 in 3 people will have a serious problem if it is not treated.  Early diagnosis allows for 
more effective treatment and cure.  If you have a family history of AAA or are a male age 65-75 who has smoked, you are at higher risk of an AAA.  Your provider can order this test, if needed.    Colorectal Screening:  There are many tests that are used to check for cancer of your colon and rectum. You and your provider should discuss what test is best for you and when to have it done.  Options include:  Screening Colonoscopy: exam of the entire colon, seen through a flexible lighted tube.  Flexible Sigmoidoscopy: exam of the last third (sigmoid portion) of the colon and rectum, seen through a flexible lighted tube.  Cologuard DNA stool test: a sample of your stool is used to screen for cancer and unseen blood in your stool.  Fecal Occult Blood Test: a sample of your stool is studied to find any unseen blood    Flu Shot:  An immunization that helps to prevent influenza (the flu). You should get this every year. The best time to get the shot is in the fall.    Pneumococcal Shot:  Vaccines help prevent pneumococcal disease, which is any type of illness caused by Streptococcus pneumoniae bacteria. There are two kinds of pneumococcal vaccines available in the [place]:   Pneumococcal conjugate vaccines (PCV20 or Yvxbnyf02®)  Pneumococcal polysaccharide vaccine (PPSV23 or Easqmhvrz07®)  For those who have never received any pneumococcal conjugate vaccine, CDC recommends PVC20 for adults 65 years or older and adults 19 through 64 years old with certain medical conditions or risk factors.   For those who have previously received PCV13, this should be followed by a dose of PPSV23.     Hepatitis B Shot:  An immunization that helps to protect people from getting Hepatitis B. Hepatitis B is a virus that spreads through contact with infected blood or body fluids. Many people with the virus do not have symptoms.  The virus can lead to serious problems, such as liver disease. Some people are at higher risk than others. Your 
doctor will tell you if you need this shot.     Diabetes Screening:  A test to measure sugar (glucose) in your blood is called a fasting blood sugar. Fasting means you cannot have food or drink for at least 8 hours before the test. This test can detect diabetes long before you may notice symptoms.    Glaucoma Screening:  Glaucoma screening is performed by your eye doctor. The test measures the fluid pressure inside your eyes to determine if you have glaucoma.     Hepatitis C Screening:  A blood test to see if you have the hepatitis C virus.  Hepatitis C attacks the liver and is a major cause of chronic liver disease.  Medicare will cover a single screening for all adults born between 1945 & 1965, or high risk patients (people who have injected illegal drugs or people who have had blood transfusions).  High risk patients who continue to inject illegal drugs can be screened for Hepatitis C every year.    Smoking and Tobacco-Use Cessation Counseling:  Tobacco is the single greatest cause of disease and early death in our country today. Medication and counseling together can increase a person’s chance of quitting for good.   Medicare covers two quitting attempts per year, with four counseling sessions per attempt (eight sessions in a 12 month period)    Preventive Screening tests for Women    Screening Mammograms and Breast Exams:  An x-ray of your breasts to check for breast cancer before you or your doctor may be able to feel it.  If breast cancer is found early it can usually be treated with success.    Pelvic Exams and Pap Tests:  An exam to check for cervical and vaginal cancer. A Pap test is a lab test in which cells are taken from your cervix and sent to the lab to look for signs of cervical cancer. If cancer of the cervix is found early, chances for a cure are good. Testing can generally end at age 65, or if a woman has a hysterectomy for a benign condition. Your provider may recommend more frequent testing if 
DISPLAY PLAN FREE TEXT
certain abnormal results are found.    Bone Mass Measurements:  A painless x-ray of your bone density to see if you are at risk for a broken bone. Bone density refers to the thickness of bones or how tightly the bone tissue is packed.    Preventive Screening tests for Men    Prostate Screening:  Should you have a prostate cancer test (PSA)?  It is up to you to decide if you want a prostate cancer test. Talk to your clinician to find out if the test is right for you.  Things for you to consider and talk about should include:  Benefits and harms of the test  Your family history  How your race/ethnicity may influence the test  If the test may impact other medical conditions you have  Your values on screenings and treatments    *Medicare pays for many preventive services to keep you healthy. For some of these services, you might have to pay a deductible, coinsurance, and / or copayment.  The amounts vary depending on the type of services you need and the kind of Medicare health plan you have.    For further details on screenings offered by Medicare please visit: https://www.medicare.gov/coverage/preventive-screening-services     
DISPLAY PLAN FREE TEXT

## 2025-02-19 ENCOUNTER — RX RENEWAL (OUTPATIENT)
Age: 89
End: 2025-02-19

## 2025-02-20 ENCOUNTER — APPOINTMENT (OUTPATIENT)
Dept: GERIATRICS | Facility: CLINIC | Age: 89
End: 2025-02-20
Payer: MEDICARE

## 2025-02-20 DIAGNOSIS — R63.4 ABNORMAL WEIGHT LOSS: ICD-10-CM

## 2025-02-20 DIAGNOSIS — N28.9 DISORDER OF KIDNEY AND URETER, UNSPECIFIED: ICD-10-CM

## 2025-02-20 DIAGNOSIS — E55.9 VITAMIN D DEFICIENCY, UNSPECIFIED: ICD-10-CM

## 2025-02-20 DIAGNOSIS — R06.02 SHORTNESS OF BREATH: ICD-10-CM

## 2025-02-20 PROCEDURE — 99349 HOME/RES VST EST MOD MDM 40: CPT

## 2025-02-21 ENCOUNTER — EMERGENCY (EMERGENCY)
Facility: HOSPITAL | Age: 89
LOS: 1 days | Discharge: ANOTHER HOSPITAL | End: 2025-02-21
Attending: STUDENT IN AN ORGANIZED HEALTH CARE EDUCATION/TRAINING PROGRAM | Admitting: STUDENT IN AN ORGANIZED HEALTH CARE EDUCATION/TRAINING PROGRAM
Payer: MEDICARE

## 2025-02-21 VITALS
TEMPERATURE: 98 F | HEART RATE: 79 BPM | SYSTOLIC BLOOD PRESSURE: 88 MMHG | WEIGHT: 106.92 LBS | HEIGHT: 63 IN | DIASTOLIC BLOOD PRESSURE: 64 MMHG | OXYGEN SATURATION: 94 % | RESPIRATION RATE: 18 BRPM

## 2025-02-21 VITALS
OXYGEN SATURATION: 94 % | RESPIRATION RATE: 12 BRPM | SYSTOLIC BLOOD PRESSURE: 130 MMHG | DIASTOLIC BLOOD PRESSURE: 70 MMHG | HEART RATE: 84 BPM

## 2025-02-21 DIAGNOSIS — Z98.890 OTHER SPECIFIED POSTPROCEDURAL STATES: Chronic | ICD-10-CM

## 2025-02-21 LAB
ALBUMIN SERPL ELPH-MCNC: 3.3 G/DL — SIGNIFICANT CHANGE UP (ref 3.3–5)
ALP SERPL-CCNC: 89 U/L — SIGNIFICANT CHANGE UP (ref 40–120)
ALT FLD-CCNC: 21 U/L — SIGNIFICANT CHANGE UP (ref 10–45)
ANION GAP SERPL CALC-SCNC: 9 MMOL/L — SIGNIFICANT CHANGE UP (ref 5–17)
AST SERPL-CCNC: 23 U/L — SIGNIFICANT CHANGE UP (ref 10–40)
BASOPHILS # BLD AUTO: 0.03 K/UL — SIGNIFICANT CHANGE UP (ref 0–0.2)
BASOPHILS NFR BLD AUTO: 0.3 % — SIGNIFICANT CHANGE UP (ref 0–2)
BILIRUB SERPL-MCNC: 0.3 MG/DL — SIGNIFICANT CHANGE UP (ref 0.2–1.2)
BUN SERPL-MCNC: 32 MG/DL — HIGH (ref 7–23)
CALCIUM SERPL-MCNC: 9.1 MG/DL — SIGNIFICANT CHANGE UP (ref 8.4–10.5)
CHLORIDE SERPL-SCNC: 110 MMOL/L — HIGH (ref 96–108)
CO2 SERPL-SCNC: 27 MMOL/L — SIGNIFICANT CHANGE UP (ref 22–31)
CREAT SERPL-MCNC: 1.42 MG/DL — HIGH (ref 0.5–1.3)
EGFR: 35 ML/MIN/1.73M2 — LOW
EOSINOPHIL # BLD AUTO: 0.36 K/UL — SIGNIFICANT CHANGE UP (ref 0–0.5)
EOSINOPHIL NFR BLD AUTO: 3.5 % — SIGNIFICANT CHANGE UP (ref 0–6)
GLUCOSE SERPL-MCNC: 123 MG/DL — HIGH (ref 70–99)
HCT VFR BLD CALC: 37.9 % — SIGNIFICANT CHANGE UP (ref 34.5–45)
HGB BLD-MCNC: 12.5 G/DL — SIGNIFICANT CHANGE UP (ref 11.5–15.5)
IMM GRANULOCYTES NFR BLD AUTO: 0.5 % — SIGNIFICANT CHANGE UP (ref 0–0.9)
LYMPHOCYTES # BLD AUTO: 29.7 % — SIGNIFICANT CHANGE UP (ref 13–44)
LYMPHOCYTES # BLD AUTO: 3.04 K/UL — SIGNIFICANT CHANGE UP (ref 1–3.3)
MCHC RBC-ENTMCNC: 31.7 PG — SIGNIFICANT CHANGE UP (ref 27–34)
MCHC RBC-ENTMCNC: 33 G/DL — SIGNIFICANT CHANGE UP (ref 32–36)
MCV RBC AUTO: 96.2 FL — SIGNIFICANT CHANGE UP (ref 80–100)
MONOCYTES # BLD AUTO: 0.69 K/UL — SIGNIFICANT CHANGE UP (ref 0–0.9)
MONOCYTES NFR BLD AUTO: 6.8 % — SIGNIFICANT CHANGE UP (ref 2–14)
NEUTROPHILS # BLD AUTO: 6.05 K/UL — SIGNIFICANT CHANGE UP (ref 1.8–7.4)
NEUTROPHILS NFR BLD AUTO: 59.2 % — SIGNIFICANT CHANGE UP (ref 43–77)
NRBC BLD AUTO-RTO: 0 /100 WBCS — SIGNIFICANT CHANGE UP (ref 0–0)
PLATELET # BLD AUTO: 198 K/UL — SIGNIFICANT CHANGE UP (ref 150–400)
POTASSIUM SERPL-MCNC: 4.8 MMOL/L — SIGNIFICANT CHANGE UP (ref 3.5–5.3)
POTASSIUM SERPL-SCNC: 4.8 MMOL/L — SIGNIFICANT CHANGE UP (ref 3.5–5.3)
PROT SERPL-MCNC: 6.9 G/DL — SIGNIFICANT CHANGE UP (ref 6–8.3)
RBC # BLD: 3.94 M/UL — SIGNIFICANT CHANGE UP (ref 3.8–5.2)
RBC # FLD: 14.2 % — SIGNIFICANT CHANGE UP (ref 10.3–14.5)
SODIUM SERPL-SCNC: 146 MMOL/L — HIGH (ref 135–145)
WBC # BLD: 10.22 K/UL — SIGNIFICANT CHANGE UP (ref 3.8–10.5)
WBC # FLD AUTO: 10.22 K/UL — SIGNIFICANT CHANGE UP (ref 3.8–10.5)

## 2025-02-21 PROCEDURE — 72192 CT PELVIS W/O DYE: CPT | Mod: 26

## 2025-02-21 PROCEDURE — 71045 X-RAY EXAM CHEST 1 VIEW: CPT | Mod: 26

## 2025-02-21 PROCEDURE — 99285 EMERGENCY DEPT VISIT HI MDM: CPT

## 2025-02-21 PROCEDURE — 70450 CT HEAD/BRAIN W/O DYE: CPT | Mod: 26

## 2025-02-21 PROCEDURE — 93010 ELECTROCARDIOGRAM REPORT: CPT

## 2025-02-21 PROCEDURE — 72170 X-RAY EXAM OF PELVIS: CPT | Mod: 26

## 2025-02-21 RX ORDER — BACTERIOSTATIC SODIUM CHLORIDE 0.9 %
500 VIAL (ML) INJECTION ONCE
Refills: 0 | Status: COMPLETED | OUTPATIENT
Start: 2025-02-21 | End: 2025-02-21

## 2025-02-21 RX ADMIN — Medication 1000 MILLILITER(S): at 22:40

## 2025-02-21 RX ADMIN — Medication 500 MILLILITER(S): at 23:49

## 2025-02-21 NOTE — ED ADULT NURSE NOTE - NSFALLHARMRISKINTERV_ED_ALL_ED

## 2025-02-21 NOTE — ED PROVIDER NOTE - OBJECTIVE STATEMENT
89 yo f ho advanced demential, htn presents with ams. Pt was 89 yo f ho advanced demential, htn presents with ams. Pt was Brought in by her aide who said patient seemed more lethargic.  Patient was sitting at dinner but then began feeling weak.  Denies any active complaints.  Started leaning over to 1 side of the table.  When attempted to walk she just felt too well to walk.  Denies any change in her normal mental status baseline.  ANO x 0 at baseline.  Denies any falls trauma.  Concern patient is in pain and that is why she is not walking.  HPI limited as patient's baseline mental status

## 2025-02-21 NOTE — ED ADULT NURSE NOTE - OBJECTIVE STATEMENT
Patient BIBA from home accompanied by aide with complaints of R hip pain. As per aide, patient did not fall but this evening when aide went to walk with the patient, she refused to ambulate d/t hip pain. Hx of dementia & at baseline mental status upon assessment. Denies chest pain, dizziness, SOB, headahce. Side rails up, call light in reach, safety maintained, comfort measures provided and MD evaluation in progress.

## 2025-02-21 NOTE — ED ADULT TRIAGE NOTE - CHIEF COMPLAINT QUOTE
PAtient presents to ED via EMS from home for complaint of right hip pain after sliding out of the chair [facility] although denies fall as per aide. Patient awake and follows simple commands. Denies headache or loss of conscioussness.

## 2025-02-21 NOTE — ED PROVIDER NOTE - PHYSICAL EXAMINATION
CONSTITUTIONAL: NAD  SKIN: Warm dry  HEAD: NCAT  EYES: NL inspection  ENT: MMM  NECK: Supple  CARD: RRR  RESP: Unlabored respirations, CTAB  ABD: S/NT no R/G  EXT: no pedal edema, pelvis stable, FROM LE and hip  NEURO: Grossly unremarkable  PSYCH: Cooperative, appropriate.

## 2025-02-21 NOTE — ED ADULT NURSE NOTE - CHIEF COMPLAINT QUOTE
PAtient presents to ED via EMS from home for complaint of right hip pain after sliding out of the chair [facility] although denies fall as per aide. Patient awake and follows simple commands. Denies headache or loss of conscioussness.

## 2025-02-21 NOTE — ED PROVIDER NOTE - CLINICAL SUMMARY MEDICAL DECISION MAKING FREE TEXT BOX
89 yo f ho advanced demential, htn presents with ams. Pt was Brought in by her aide who said patient seemed more lethargic.  Patient was sitting at dinner but then began feeling weak.  Denies any active complaints.  Started leaning over to 1 side of the table.  When attempted to walk she just felt too well to walk.  Denies any change in her normal mental status baseline.  ANO x 0 at baseline.  Denies any falls trauma.  Concern patient is in pain and that is why she is not walking.  HPI limited as patient's baseline mental status  Exam as stated  Patient with nonfocal generalized weakness.  At baseline as per aide no evidence of trauma.  X-ray is limited because patient was uncomfortable CT head and CT pelvis negative.  Incidental findings reviewed with the aide at bedside vies follow-up PCP.  No UTI.  Shared decision making with son overall does prefer patient to be sent back home if possible.  No acute findings.  Advised if the patient more confused not walking at baseline may need further testing including MRI which required admission and family defers that. 89 yo f ho advanced demential, htn presents with ams. Pt was Brought in by her aide who said patient seemed more lethargic.  Patient was sitting at dinner but then began feeling weak.  Denies any active complaints.  Started leaning over to 1 side of the table.  When attempted to walk she just felt too well to walk.  Denies any change in her normal mental status baseline.  ANO x 0 at baseline.  Denies any falls trauma.  Concern patient is in pain and that is why she is not walking.  HPI limited as patient's baseline mental status  Exam as stated  Patient with nonfocal generalized weakness.  At baseline as per aide no evidence of trauma.  X-ray is limited because patient was uncomfortable CT head and CT pelvis negative.  Incidental findings reviewed with the aide at bedside vies follow-up PCP.  No UTI.  Shared decision making with son overall does prefer patient to be sent back home if possible.  No acute findings.  Advised if the patient more confused not walking at baseline may need further testing including MRI which required admission and family defers that.    EMS arrived 3:20am - upon repeat VS, BP elevated. EMS concerned for "hypertensive emergency". Pt agitated in setting of just being woken up/dementia history pt otherwise at her baseline as per aide at bedside. Previous BP readings nl. Not concerned for htn emergency. Pt to fu with pcp and aide aware

## 2025-02-21 NOTE — ED PROVIDER NOTE - PATIENT PORTAL LINK FT
You can access the FollowMyHealth Patient Portal offered by [facility] by registering at the following website: [email or web address]. By joining [employer]’s FollowMyHealth portal, you will also be able to view your health information using other applications (apps) compatible with our system.
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
## 2025-02-22 VITALS
SYSTOLIC BLOOD PRESSURE: 124 MMHG | TEMPERATURE: 98 F | RESPIRATION RATE: 18 BRPM | HEART RATE: 66 BPM | DIASTOLIC BLOOD PRESSURE: 73 MMHG | OXYGEN SATURATION: 95 %

## 2025-02-22 LAB
APPEARANCE UR: CLEAR — SIGNIFICANT CHANGE UP
BILIRUB UR-MCNC: NEGATIVE — SIGNIFICANT CHANGE UP
COLOR SPEC: YELLOW — SIGNIFICANT CHANGE UP
DIFF PNL FLD: NEGATIVE — SIGNIFICANT CHANGE UP
GLUCOSE UR QL: NEGATIVE MG/DL — SIGNIFICANT CHANGE UP
KETONES UR-MCNC: NEGATIVE MG/DL — SIGNIFICANT CHANGE UP
LEUKOCYTE ESTERASE UR-ACNC: NEGATIVE — SIGNIFICANT CHANGE UP
NITRITE UR-MCNC: NEGATIVE — SIGNIFICANT CHANGE UP
PH UR: 7 — SIGNIFICANT CHANGE UP (ref 5–8)
PROT UR-MCNC: NEGATIVE MG/DL — SIGNIFICANT CHANGE UP
SP GR SPEC: 1.02 — SIGNIFICANT CHANGE UP (ref 1–1.03)
UROBILINOGEN FLD QL: 0.2 MG/DL — SIGNIFICANT CHANGE UP (ref 0.2–1)

## 2025-02-22 PROCEDURE — 36415 COLL VENOUS BLD VENIPUNCTURE: CPT

## 2025-02-22 PROCEDURE — 70450 CT HEAD/BRAIN W/O DYE: CPT | Mod: MC

## 2025-02-22 PROCEDURE — 71045 X-RAY EXAM CHEST 1 VIEW: CPT

## 2025-02-22 PROCEDURE — 72192 CT PELVIS W/O DYE: CPT | Mod: MC

## 2025-02-22 PROCEDURE — 81003 URINALYSIS AUTO W/O SCOPE: CPT

## 2025-02-22 PROCEDURE — 72170 X-RAY EXAM OF PELVIS: CPT

## 2025-02-22 PROCEDURE — 99285 EMERGENCY DEPT VISIT HI MDM: CPT | Mod: 25

## 2025-02-22 PROCEDURE — 82962 GLUCOSE BLOOD TEST: CPT

## 2025-02-22 PROCEDURE — 80053 COMPREHEN METABOLIC PANEL: CPT

## 2025-02-22 PROCEDURE — 93005 ELECTROCARDIOGRAM TRACING: CPT

## 2025-02-22 PROCEDURE — 85025 COMPLETE CBC W/AUTO DIFF WBC: CPT

## 2025-02-22 NOTE — ED ADULT NURSE REASSESSMENT NOTE - NS ED NURSE REASSESS COMMENT FT1
Patient resting comfortably at this time. Offering no complaints. Patient at baseline mental status. Awaiting DC & transport home.

## 2025-02-22 NOTE — ED ADULT NURSE REASSESSMENT NOTE - NS ED NURSE REASSESS COMMENT FT1
EMS arrival for p/u @ 02:30. Vitals as per flow sheet. Patient agitated at this time d/t being woken up/hx dementia & removal of wires, EKG stickers & IV line. As per aide patient at baseline mental status. EMS arrival for p/u @ 03:30. Vitals as per flow sheet. Patient agitated at this time d/t being woken up/hx dementia & removal of wires, EKG stickers & IV line. As per aide patient at baseline mental status.

## 2025-02-22 NOTE — ED POST DISCHARGE NOTE - DETAILS
chart revd, pt seen for weakness, poss leg/hip pain/injury in this 89 y/o pt with dementia. per chart, limited xray 2/2 pts inability to stay in proper position, would not tlerate 2 view. no clinical cncern for pna based on chart review, no symptms, inc afebrile and no wbc. lungs clear

## 2025-02-24 ENCOUNTER — LABORATORY RESULT (OUTPATIENT)
Age: 89
End: 2025-02-24

## 2025-02-24 ENCOUNTER — NON-APPOINTMENT (OUTPATIENT)
Age: 89
End: 2025-02-24

## 2025-02-24 ENCOUNTER — APPOINTMENT (OUTPATIENT)
Age: 89
End: 2025-02-24

## 2025-02-24 DIAGNOSIS — R53.81 OTHER MALAISE: ICD-10-CM

## 2025-02-24 DIAGNOSIS — F32.A ANXIETY DISORDER, UNSPECIFIED: ICD-10-CM

## 2025-02-24 DIAGNOSIS — R26.81 UNSTEADINESS ON FEET: ICD-10-CM

## 2025-02-24 DIAGNOSIS — F41.9 ANXIETY DISORDER, UNSPECIFIED: ICD-10-CM

## 2025-02-24 PROCEDURE — 99348 HOME/RES VST EST LOW MDM 30: CPT

## 2025-02-25 ENCOUNTER — LABORATORY RESULT (OUTPATIENT)
Age: 89
End: 2025-02-25

## 2025-02-25 PROBLEM — R26.81 UNSTEADY GAIT: Status: ACTIVE | Noted: 2025-02-25

## 2025-02-25 PROBLEM — R53.81 PHYSICAL DECONDITIONING: Status: ACTIVE | Noted: 2025-02-25

## 2025-02-25 NOTE — CHART NOTE - NSCHARTNOTEFT_GEN_A_CORE
89 y o female presented to the ER on 02/12 with hip pain as per chart.  Per HIE, the patient had a geriatric home visit on 02/24 with Dr. Maggy Taylor.

## 2025-02-27 ENCOUNTER — APPOINTMENT (OUTPATIENT)
Dept: GERIATRICS | Facility: CLINIC | Age: 89
End: 2025-02-27
Payer: MEDICARE

## 2025-02-27 VITALS
DIASTOLIC BLOOD PRESSURE: 80 MMHG | HEART RATE: 84 BPM | RESPIRATION RATE: 12 BRPM | SYSTOLIC BLOOD PRESSURE: 160 MMHG | OXYGEN SATURATION: 88 %

## 2025-02-27 DIAGNOSIS — M79.89 OTHER SPECIFIED SOFT TISSUE DISORDERS: ICD-10-CM

## 2025-02-27 DIAGNOSIS — R53.83 OTHER FATIGUE: ICD-10-CM

## 2025-02-27 DIAGNOSIS — F03.C0 UNSPECIFIED DEMENTIA, SEVERE, WITHOUT BEHAVIORAL DISTURBANCE, PSYCHOTIC DISTURBANCE, MOOD DISTURBANCE, AND ANXIETY: ICD-10-CM

## 2025-02-27 DIAGNOSIS — I10 ESSENTIAL (PRIMARY) HYPERTENSION: ICD-10-CM

## 2025-02-27 PROCEDURE — 99349 HOME/RES VST EST MOD MDM 40: CPT

## 2025-02-27 PROCEDURE — 99497 ADVNCD CARE PLAN 30 MIN: CPT

## 2025-02-27 RX ORDER — ASPIRIN 325 MG/1
325 TABLET, FILM COATED ORAL DAILY
Refills: 0 | Status: ACTIVE | COMMUNITY
Start: 2025-02-27

## 2025-03-01 DIAGNOSIS — R09.02 HYPOXEMIA: ICD-10-CM

## 2025-03-12 ENCOUNTER — RX RENEWAL (OUTPATIENT)
Age: 89
End: 2025-03-12

## 2025-04-09 ENCOUNTER — APPOINTMENT (OUTPATIENT)
Age: 89
End: 2025-04-09

## 2025-04-09 DIAGNOSIS — F03.C0 UNSPECIFIED DEMENTIA, SEVERE, WITHOUT BEHAVIORAL DISTURBANCE, PSYCHOTIC DISTURBANCE, MOOD DISTURBANCE, AND ANXIETY: ICD-10-CM

## 2025-04-09 DIAGNOSIS — J18.9 PNEUMONIA, UNSPECIFIED ORGANISM: ICD-10-CM

## 2025-04-09 DIAGNOSIS — J06.9 ACUTE UPPER RESPIRATORY INFECTION, UNSPECIFIED: ICD-10-CM

## 2025-04-09 PROCEDURE — 99348 HOME/RES VST EST LOW MDM 30: CPT

## 2025-04-09 RX ORDER — AZITHROMYCIN 250 MG/1
250 TABLET, FILM COATED ORAL
Qty: 1 | Refills: 0 | Status: ACTIVE | COMMUNITY
Start: 2025-04-09 | End: 1900-01-01

## 2025-04-09 RX ORDER — AMOXICILLIN AND CLAVULANATE POTASSIUM 875; 125 MG/1; MG/1
875-125 TABLET, COATED ORAL
Qty: 10 | Refills: 0 | Status: ACTIVE | COMMUNITY
Start: 2025-04-09 | End: 1900-01-01

## 2025-04-09 RX ORDER — BENZONATATE 100 MG/1
100 CAPSULE ORAL
Qty: 15 | Refills: 0 | Status: ACTIVE | COMMUNITY
Start: 2025-04-09 | End: 1900-01-01

## 2025-04-10 DIAGNOSIS — J10.1 INFLUENZA DUE TO OTHER IDENTIFIED INFLUENZA VIRUS WITH OTHER RESPIRATORY MANIFESTATIONS: ICD-10-CM

## 2025-04-10 LAB
INFLUENZA A RESULT: DETECTED
INFLUENZA B RESULT: NOT DETECTED
RESP SYN VIRUS RESULT: NOT DETECTED
SARS-COV-2 RESULT: NOT DETECTED

## 2025-04-10 RX ORDER — OSELTAMIVIR PHOSPHATE 30 MG/1
30 CAPSULE ORAL
Qty: 10 | Refills: 0 | Status: ACTIVE | COMMUNITY
Start: 2025-04-10 | End: 1900-01-01

## 2025-04-25 ENCOUNTER — NON-APPOINTMENT (OUTPATIENT)
Age: 89
End: 2025-04-25